# Patient Record
Sex: MALE | Race: WHITE | NOT HISPANIC OR LATINO | Employment: OTHER | URBAN - METROPOLITAN AREA
[De-identification: names, ages, dates, MRNs, and addresses within clinical notes are randomized per-mention and may not be internally consistent; named-entity substitution may affect disease eponyms.]

---

## 2017-03-23 ENCOUNTER — COMMUNICATION - HEALTHEAST (OUTPATIENT)
Dept: INTERNAL MEDICINE | Facility: CLINIC | Age: 77
End: 2017-03-23

## 2017-03-23 DIAGNOSIS — N40.0 BPH (BENIGN PROSTATIC HYPERPLASIA): ICD-10-CM

## 2017-05-02 ENCOUNTER — COMMUNICATION - HEALTHEAST (OUTPATIENT)
Dept: INTERNAL MEDICINE | Facility: CLINIC | Age: 77
End: 2017-05-02

## 2017-05-02 DIAGNOSIS — I10 HYPERTENSION: ICD-10-CM

## 2017-08-10 ENCOUNTER — COMMUNICATION - HEALTHEAST (OUTPATIENT)
Dept: INTERNAL MEDICINE | Facility: CLINIC | Age: 77
End: 2017-08-10

## 2017-08-10 ENCOUNTER — OFFICE VISIT - HEALTHEAST (OUTPATIENT)
Dept: INTERNAL MEDICINE | Facility: CLINIC | Age: 77
End: 2017-08-10

## 2017-08-10 DIAGNOSIS — I10 ESSENTIAL HYPERTENSION: ICD-10-CM

## 2017-08-10 DIAGNOSIS — G57.00 PIRIFORMIS SYNDROME: ICD-10-CM

## 2017-08-10 DIAGNOSIS — R01.1 NEWLY RECOGNIZED MURMUR: ICD-10-CM

## 2017-08-10 DIAGNOSIS — N31.8 HYPERTONICITY OF BLADDER: ICD-10-CM

## 2017-08-10 DIAGNOSIS — R97.20 ELEVATED PSA: ICD-10-CM

## 2017-08-10 DIAGNOSIS — Z76.89 ESTABLISHING CARE WITH NEW DOCTOR, ENCOUNTER FOR: ICD-10-CM

## 2017-08-10 LAB — PSA SERPL-MCNC: 8.4 NG/ML (ref 0–6.5)

## 2017-08-10 ASSESSMENT — MIFFLIN-ST. JEOR: SCORE: 1511.78

## 2017-08-11 ENCOUNTER — COMMUNICATION - HEALTHEAST (OUTPATIENT)
Dept: TELEHEALTH | Facility: CLINIC | Age: 77
End: 2017-08-11

## 2017-09-13 ENCOUNTER — TELEPHONE (OUTPATIENT)
Dept: INTERNAL MEDICINE | Facility: CLINIC | Age: 77
End: 2017-09-13

## 2017-09-13 NOTE — TELEPHONE ENCOUNTER
Patient called and stated that he used to be seen in clinic by Dr. Barba but recently has been followed by Dr. Del Rio at [facility].     Patient stated that Dr. Del Rio noted a hear murmer during a annual physical and recommended a echocardiogram.     Patient wants a second opinion and was recommended to see Dr. Jonas by Dr. Steinberg.    Dr. Jonas- would there be any way to fit patient in earlier than end of November?     Patient will have [facility] fax over records to office.     Thank you

## 2017-09-18 ENCOUNTER — OFFICE VISIT - HEALTHEAST (OUTPATIENT)
Dept: INTERNAL MEDICINE | Facility: CLINIC | Age: 77
End: 2017-09-18

## 2017-09-18 DIAGNOSIS — R01.1 HEART MURMUR: ICD-10-CM

## 2017-09-18 DIAGNOSIS — R97.20 ELEVATED PSA: ICD-10-CM

## 2017-09-18 DIAGNOSIS — H10.9 CONJUNCTIVITIS: ICD-10-CM

## 2017-09-29 ENCOUNTER — OFFICE VISIT (OUTPATIENT)
Dept: INTERNAL MEDICINE | Facility: CLINIC | Age: 77
End: 2017-09-29
Payer: COMMERCIAL

## 2017-09-29 VITALS
TEMPERATURE: 98.3 F | HEIGHT: 71 IN | HEART RATE: 80 BPM | WEIGHT: 174.2 LBS | BODY MASS INDEX: 24.39 KG/M2 | SYSTOLIC BLOOD PRESSURE: 126 MMHG | OXYGEN SATURATION: 96 % | DIASTOLIC BLOOD PRESSURE: 70 MMHG

## 2017-09-29 DIAGNOSIS — R97.20 ELEVATED PROSTATE SPECIFIC ANTIGEN (PSA): ICD-10-CM

## 2017-09-29 DIAGNOSIS — R00.8 GALLOP RHYTHM: Primary | ICD-10-CM

## 2017-09-29 DIAGNOSIS — R01.2 S4 (FOURTH HEART SOUND): ICD-10-CM

## 2017-09-29 PROCEDURE — 99214 OFFICE O/P EST MOD 30 MIN: CPT | Performed by: INTERNAL MEDICINE

## 2017-09-29 NOTE — MR AVS SNAPSHOT
"              After Visit Summary   9/29/2017    Jorge Dupree    MRN: [id number]           Patient Information     Date Of Birth          1940        Visit Information        Provider Department      9/29/2017 1:40 PM Rajendra Jonas MD [facility]        Today's Diagnoses     Gallop rhythm    -  1    S4 (fourth heart sound)        Elevated prostate specific antigen (PSA)          Care Instructions    I am not certain that I hear a heart \"murmur\" (turbulence through heart chamber or across heart valve).   I do think that I hear an \"extra sound\" (possibly an \"S4\" heart sound). This could be from stiffness of the heart or thickening of the heart muscle. Less likely, it could be from the heart pumping less efficiently than we would usually expect.     An echocardiogram (heart ultrasound) would be quite helpful to better understand why your heart sounds the way it does with a stethoscope.   Although it does not seem mandatory to get this test, I think it would be useful and helpful in explaining your heart exam.     It sounds like this could be done at [facility], or at [facility].   The phone number for [facility] echocardiogram lab is [phone].     If you get the echocardiogram done at [facility] but would like to have me interpret the report with you, recommend either a follow up appointment or a phone conversation. If you wish to discuss by phone, be sure that [facility] forwards the results to me.     Usually I will get the Urologist to give us an opinion in their office when the PSA is elevated. If interested, I've provided the phone number for the Urology specialist in [place].           Follow-ups after your visit        Additional Services     UROLOGY ADULT REFERRAL       Your provider has referred you to: JEN: [facility] [place] [phone]   [email or web address]    Please be aware that coverage of these " "services is subject to the terms and limitations of your health insurance plan.  Call member services at your health plan with any benefit or coverage questions.      Please bring the following with you to your appointment:    (1) Any X-Rays, CTs or MRIs which have been performed.  Contact the facility where they were done to arrange for  prior to your scheduled appointment.    (2) List of current medications  (3) This referral request   (4) Any documents/labs given to you for this referral                  Future tests that were ordered for you today     Open Future Orders        Priority Expected Expires Ordered    Echocardiogram Complete Routine  9/29/2018 9/29/2017            Who to contact     If you have questions or need follow up information about today's clinic visit or your schedule please contact [facility] directly at [phone].  Normal or non-critical lab and imaging results will be communicated to you by MyChart, letter or phone within 4 business days after the clinic has received the results. If you do not hear from us within 7 days, please contact the clinic through MyChart or phone. If you have a critical or abnormal lab result, we will notify you by phone as soon as possible.  Submit refill requests through [employer] or call your pharmacy and they will forward the refill request to us. Please allow 3 business days for your refill to be completed.          Additional Information About Your Visit        [employer] Information     [employer] lets you send messages to your doctor, view your test results, renew your prescriptions, schedule appointments and more. To sign up, go to [email or web address] . Click on \"Log in\" on the left side of the screen, which will take you to the Welcome page. Then click on \"Sign up Now\" on the right side of the page.     You will be asked to enter the access code listed below, as well as some personal information. Please follow the directions to " "create your username and password.     Your access code is: [email or web address]  Expires: 2017  2:39 PM     Your access code will  in 90 days. If you need help or a new code, please call your [place] clinic or [phone].        Care EveryWhere ID     This is your Care EveryWhere ID. This could be used by other organizations to access your [place] medical records  [id number]        Your Vitals Were     Pulse Temperature Height Pulse Oximetry BMI (Body Mass Index)       80 98.3  F (36.8  C) (Oral) 5' 11\" (1.803 m) 96% 24.3 kg/m2        Blood Pressure from Last 3 Encounters:   17 126/70   14 134/88   13 108/60    Weight from Last 3 Encounters:   17 174 lb 3.2 oz (79 kg)   14 177 lb (80.3 kg)   13 171 lb 14.4 oz (78 kg)              We Performed the Following     UROLOGY ADULT REFERRAL        Primary Care Provider    None Specified       No primary provider on file.        Equal Access to Services     [facility]: Hadii walter Del Valle, autumn lloyd, qaenrique brand, sarah alcantara . So [facility] [phone].    ATENCIÓN: Si habla español, tiene a gimenez disposición servicios gratuitos de asistencia lingüística. [facility] [phone].    We comply with applicable federal civil rights laws and [place] laws. We do not discriminate on the basis of race, color, national origin, age, disability, sex, sexual orientation, or gender identity.            Thank you!     Thank you for choosing [facility]  for your care. Our goal is always to provide you with excellent care. Hearing back from our patients is one way we can continue to improve our services. Please take a few minutes to complete the written survey that you may receive in the mail after your visit with us. Thank you!             Your Updated Medication List - Protect others around you: Learn how to safely use, store and throw away your medicines at " www.disposemymeds.org.          This list is accurate as of: 9/29/17  2:44 PM.  Always use your most recent med list.                   Brand Name Dispense Instructions for use Diagnosis    amLODIPine 2.5 MG tablet    NORVASC    90 tablet    Take 1 tablet (2.5 mg) by mouth daily    Essential hypertension, benign, Routine general medical examination at a health care facility, Need for prophylactic vaccination and inoculation against influenza       aspirin 81 MG tablet     100    ONE DAILY    Essential hypertension, benign       finasteride 5 MG tablet    PROSCAR    90 tablet    Take 1 tablet (5 mg) by mouth daily    Hypertrophy of prostate with urinary obstruction and other lower urinary tract symptoms (LUTS), Routine general medical examination at a health care facility, Essential hypertension, benign, CARDIOVASCULAR SCREENING; LDL GOAL LESS THAN 160, Urgency-frequency syndrome, Anemia       tamsulosin 0.4 MG capsule    FLOMAX    90 capsule    Take 1 capsule (0.4 mg) by mouth daily    Hypertrophy of prostate with urinary obstruction and other lower urinary tract symptoms (LUTS)       tolterodine 4 MG 24 hr capsule    DETROL LA    90 capsule    Take 1 capsule (4 mg) by mouth daily    Hypertrophy of prostate with urinary obstruction and other lower urinary tract symptoms (LUTS), Urgency-frequency syndrome

## 2017-09-29 NOTE — NURSING NOTE
"Chief Complaint   Patient presents with     Consult     heart murmur       Initial /70 (BP Location: Left arm, Patient Position: Sitting, Cuff Size: Adult Large)  Pulse 80  Temp 98.3  F (36.8  C) (Oral)  Ht 5' 11\" (1.803 m)  Wt 174 lb 3.2 oz (79 kg)  SpO2 96%  BMI 24.3 kg/m2 Estimated body mass index is 24.3 kg/(m^2) as calculated from the following:    Height as of this encounter: 5' 11\" (1.803 m).    Weight as of this encounter: 174 lb 3.2 oz (79 kg).  Medication Reconciliation: complete   [employer]      "

## 2017-09-29 NOTE — PROGRESS NOTES
"  SUBJECTIVE:   1411---1441    Jorge Dupree is a 76 year old male who presents to clinic today for health counseling regarding a reported \"murmur\", and for an elevated TSH. 30 minutes were spent with the patient and his wife face to face today, over 50% of which was devoted to health counseling and education regarding these issues.     Heart murmur  Patient used to see Dr. Barba in the clinic for several years. Dr Barba has not documented any concerns regarding a previous heart murmur on prior visits. The patient more recently has seen Dr. Del Rio in [place]. Patient reports that Dr. Del Rio recommended having a cardiac US performed, but patient was apprehensive and desired another opinion.     Patient is currently taking 2.5 mg of amlodipine daily. He denies chest pain, SOB, and lower extremity edema.     Elevated PSA  PSA from 2016 was 6.7 ug/L. He also had a prostate biopsy performed by Dr. Holley afterwards.  PSA from 9/2/2017 revealed an increase in levels. Patient inquired about having another PSA screen.     Problem list and histories reviewed & adjusted, as indicated.  Additional history: as documented    Reviewed and updated as needed this visit by clinical staff  Tobacco  Allergies  Meds  Med Hx  Surg Hx  Fam Hx  Soc Hx      Reviewed and updated as needed this visit by Provider         ROS:  C: NEGATIVE for fever, chills, change in weight  R: NEGATIVE for significant cough or SOB  B: NEGATIVE for masses, tenderness or discharge  CV: NEGATIVE for chest pain, palpitations or peripheral edema  : NEGATIVE for frequency, dysuria, or hematuria  M: NEGATIVE for significant arthralgias or myalgia  N: POSITIVE for occasional dizziness with quick posture changes.   NEGATIVE for weakness, dizziness or paresthesias    This document serves as a record of the services and decisions personally performed and made by Rajendra Jonas MD. It was created on his behalf by Yomaira Monae, a trained medical " "scribe. The creation of this document is based on the provider's statements to the medical scribe.  Yomaira Monae September 29, 2017 2:08 PM     OBJECTIVE:     /70 (BP Location: Left arm, Patient Position: Sitting, Cuff Size: Adult Large)  Pulse 80  Temp 98.3  F (36.8  C) (Oral)  Ht 1.803 m (5' 11\")  Wt 79 kg (174 lb 3.2 oz)  SpO2 96%  BMI 24.3 kg/m2  Body mass index is 24.3 kg/(m^2).    GENERAL: healthy, alert and no distress  RESP: lungs clear to auscultation - no rales, rhonchi or wheezes  CV: regular rate and rhythm, normal S1 S2, no S3, no murmur, click or rub, no peripheral edema and peripheral pulses strong. Extra sound detected, suspected to be an S4  MS: no gross musculoskeletal defects noted, no edema  SKIN: no suspicious lesions or rashes  NEURO: Normal strength and tone, mentation intact and speech normal  PSYCH: mentation appears normal, affect normal/bright    ASSESSMENT/PLAN:   Health counseling regarding a reported \"murmur\", and for an elevated TSH. 30 minutes were spent with the patient and his wife face to face today, over 50% of which was devoted to health counseling and education regarding these issues.       (R00.8) Gallop rhythm  (primary encounter diagnosis)    (R01.2) S4 (fourth heart sound)  Plan: Echocardiogram Complete        Discussed with patient considering having an echocardiogram to further evaluate S4. Also discussed possible origin of S4- either from stiffening of heart muscle or from the heart beating inefficiently.      (R97.20) Elevated prostate specific antigen (PSA)  Comment: Discussed in depth PSA screenings and possible false positives. Referred to urology.   Plan: UROLOGY ADULT REFERRAL          FUTURE APPOINTMENTS:       - Follow-up visit as needed     Patient Instructions   I am not certain that I hear a heart \"murmur\" (turbulence through heart chamber or across heart valve).   I do think that I hear an \"extra sound\" (possibly an \"S4\" heart sound). This could " be from stiffness of the heart or thickening of the heart muscle. Less likely, it could be from the heart pumping less efficiently than we would usually expect.     An echocardiogram (heart ultrasound) would be quite helpful to better understand why your heart sounds the way it does with a stethoscope.   Although it does not seem mandatory to get this test, I think it would be useful and helpful in explaining your heart exam.     It sounds like this could be done at [facility], or at [facility].   The phone number for [facility] echocardiogram lab is [phone].     If you get the echocardiogram done at [facility] but would like to have me interpret the report with you, recommend either a follow up appointment or a phone conversation. If you wish to discuss by phone, be sure that [facility] forwards the results to me.     Usually I will get the Urologist to give us an opinion in their office when the PSA is elevated. If interested, I've provided the phone number for the Urology specialist in [place].     The information in this document, created by the medical scribe for me, accurately reflects the services I personally performed and the decisions made by me. I have reviewed and approved this document for accuracy prior to leaving the patient care area.  September 29, 2017 2:08 PM    Rajendra Jonas MD  [facility]

## 2017-09-29 NOTE — PATIENT INSTRUCTIONS
"I am not certain that I hear a heart \"murmur\" (turbulence through heart chamber or across heart valve).   I do think that I hear an \"extra sound\" (possibly an \"S4\" heart sound). This could be from stiffness of the heart or thickening of the heart muscle. Less likely, it could be from the heart pumping less efficiently than we would usually expect.     An echocardiogram (heart ultrasound) would be quite helpful to better understand why your heart sounds the way it does with a stethoscope.   Although it does not seem mandatory to get this test, I think it would be useful and helpful in explaining your heart exam.     It sounds like this could be done at [facility], or at [facility].   The phone number for [facility] echocardiogram lab is [phone].     If you get the echocardiogram done at [facility] but would like to have me interpret the report with you, recommend either a follow up appointment or a phone conversation. If you wish to discuss by phone, be sure that [facility] forwards the results to me.     Usually I will get the Urologist to give us an opinion in their office when the PSA is elevated. If interested, I've provided the phone number for the Urology specialist in [place].   "

## 2017-10-03 ENCOUNTER — COMMUNICATION - HEALTHEAST (OUTPATIENT)
Dept: INTERNAL MEDICINE | Facility: CLINIC | Age: 77
End: 2017-10-03

## 2017-10-03 DIAGNOSIS — N40.0 BENIGN PROSTATIC HYPERPLASIA: ICD-10-CM

## 2017-12-29 ENCOUNTER — COMMUNICATION - HEALTHEAST (OUTPATIENT)
Dept: INTERNAL MEDICINE | Facility: CLINIC | Age: 77
End: 2017-12-29

## 2017-12-29 DIAGNOSIS — N40.0 BPH (BENIGN PROSTATIC HYPERPLASIA): ICD-10-CM

## 2018-07-23 ENCOUNTER — COMMUNICATION - HEALTHEAST (OUTPATIENT)
Dept: INTERNAL MEDICINE | Facility: CLINIC | Age: 78
End: 2018-07-23

## 2018-07-23 DIAGNOSIS — N40.0 BPH (BENIGN PROSTATIC HYPERPLASIA): ICD-10-CM

## 2018-08-13 ENCOUNTER — COMMUNICATION - HEALTHEAST (OUTPATIENT)
Dept: INTERNAL MEDICINE | Facility: CLINIC | Age: 78
End: 2018-08-13

## 2018-08-21 ENCOUNTER — COMMUNICATION - HEALTHEAST (OUTPATIENT)
Dept: INTERNAL MEDICINE | Facility: CLINIC | Age: 78
End: 2018-08-21

## 2018-08-21 ENCOUNTER — OFFICE VISIT - HEALTHEAST (OUTPATIENT)
Dept: INTERNAL MEDICINE | Facility: CLINIC | Age: 78
End: 2018-08-21

## 2018-08-21 DIAGNOSIS — R01.1 HEART MURMUR: ICD-10-CM

## 2018-08-21 DIAGNOSIS — N31.8 HYPERTONICITY OF BLADDER: ICD-10-CM

## 2018-08-21 DIAGNOSIS — H61.23 BILATERAL IMPACTED CERUMEN: ICD-10-CM

## 2018-08-21 DIAGNOSIS — R97.20 ELEVATED PSA: ICD-10-CM

## 2018-08-21 DIAGNOSIS — I10 ESSENTIAL HYPERTENSION: ICD-10-CM

## 2018-08-21 DIAGNOSIS — Z00.00 ROUTINE GENERAL MEDICAL EXAMINATION AT A HEALTH CARE FACILITY: ICD-10-CM

## 2018-08-21 LAB
ALBUMIN SERPL-MCNC: 3.9 G/DL (ref 3.5–5)
ALP SERPL-CCNC: 107 U/L (ref 45–120)
ALT SERPL W P-5'-P-CCNC: 16 U/L (ref 0–45)
ANION GAP SERPL CALCULATED.3IONS-SCNC: 8 MMOL/L (ref 5–18)
AST SERPL W P-5'-P-CCNC: 17 U/L (ref 0–40)
BILIRUB SERPL-MCNC: 0.7 MG/DL (ref 0–1)
BUN SERPL-MCNC: 18 MG/DL (ref 8–28)
CALCIUM SERPL-MCNC: 9.2 MG/DL (ref 8.5–10.5)
CHLORIDE BLD-SCNC: 108 MMOL/L (ref 98–107)
CHOLEST SERPL-MCNC: 155 MG/DL
CO2 SERPL-SCNC: 25 MMOL/L (ref 22–31)
CREAT SERPL-MCNC: 0.81 MG/DL (ref 0.7–1.3)
FASTING STATUS PATIENT QL REPORTED: YES
GFR SERPL CREATININE-BSD FRML MDRD: >60 ML/MIN/1.73M2
GLUCOSE BLD-MCNC: 99 MG/DL (ref 70–125)
HDLC SERPL-MCNC: 62 MG/DL
LDLC SERPL CALC-MCNC: 86 MG/DL
POTASSIUM BLD-SCNC: 4.2 MMOL/L (ref 3.5–5)
PROT SERPL-MCNC: 6.5 G/DL (ref 6–8)
PSA SERPL-MCNC: 10.2 NG/ML (ref 0–6.5)
SODIUM SERPL-SCNC: 141 MMOL/L (ref 136–145)
TRIGL SERPL-MCNC: 34 MG/DL

## 2018-08-21 ASSESSMENT — MIFFLIN-ST. JEOR: SCORE: 1521.16

## 2018-08-22 ENCOUNTER — COMMUNICATION - HEALTHEAST (OUTPATIENT)
Dept: INTERNAL MEDICINE | Facility: CLINIC | Age: 78
End: 2018-08-22

## 2018-08-30 ENCOUNTER — COMMUNICATION - HEALTHEAST (OUTPATIENT)
Dept: INTERNAL MEDICINE | Facility: CLINIC | Age: 78
End: 2018-08-30

## 2018-08-30 ENCOUNTER — HOSPITAL ENCOUNTER (OUTPATIENT)
Dept: CARDIOLOGY | Facility: CLINIC | Age: 78
Discharge: HOME | End: 2018-08-30
Attending: INTERNAL MEDICINE

## 2018-08-30 DIAGNOSIS — N40.0 BENIGN PROSTATIC HYPERPLASIA: ICD-10-CM

## 2018-08-30 DIAGNOSIS — R01.1 HEART MURMUR: ICD-10-CM

## 2018-08-30 ASSESSMENT — MIFFLIN-ST. JEOR: SCORE: 1517.98

## 2018-08-31 LAB
AORTIC ROOT: 3.4 CM
ASCENDING AORTA: 3.1 CM
ASCENDING AORTA: 3.1 CM
BSA FOR ECHO PROCEDURE: 1.99 M2
CV BLOOD PRESSURE: NORMAL MMHG
CV ECHO HEIGHT: 70.5 IN
CV ECHO WEIGHT: 175 LBS
DOP CALC LVOT AREA: 4.52 CM2
DOP CALC LVOT DIAMETER: 2.4 CM
DOP CALC LVOT PEAK VEL: 88.7 CM/S
DOP CALC LVOT STROKE VOLUME: 85 CM3
DOP CALCLVOT PEAK VEL VTI: 18.8 CM
ECHO EJECTION FRACTION ESTIMATED: 60 %
EJECTION FRACTION: 55 % (ref 55–75)
FRACTIONAL SHORTENING: 31.9 % (ref 28–44)
INTERVENTRICULAR SEPTUM IN END DIASTOLE: 1.1 CM (ref 0.6–1)
IVS/PW RATIO: 1
LA AREA 1: 18.6 CM2
LA AREA 2: 14.9 CM2
LEFT ATRIUM LENGTH: 4.7 CM
LEFT ATRIUM SIZE: 3.1 CM
LEFT ATRIUM TO AORTIC ROOT RATIO: 0.91 NO UNITS
LEFT ATRIUM VOLUME INDEX: 25.2 ML/M2
LEFT ATRIUM VOLUME: 50.1 ML
LEFT VENTRICLE CARDIAC INDEX: 2.9 L/MIN/M2
LEFT VENTRICLE CARDIAC OUTPUT: 5.9 L/MIN
LEFT VENTRICLE DIASTOLIC VOLUME INDEX: 36.1 CM3/M2 (ref 34–74)
LEFT VENTRICLE DIASTOLIC VOLUME: 71.9 CM3 (ref 62–150)
LEFT VENTRICLE HEART RATE: 69 BPM
LEFT VENTRICLE MASS INDEX: 100.4 G/M2
LEFT VENTRICLE SYSTOLIC VOLUME INDEX: 16.1 CM3/M2 (ref 11–31)
LEFT VENTRICLE SYSTOLIC VOLUME: 32 CM3 (ref 21–61)
LEFT VENTRICULAR INTERNAL DIMENSION IN DIASTOLE: 4.89 CM (ref 4.2–5.8)
LEFT VENTRICULAR INTERNAL DIMENSION IN SYSTOLE: 3.33 CM (ref 2.5–4)
LEFT VENTRICULAR MASS: 199.8 G
LEFT VENTRICULAR OUTFLOW TRACT MEAN GRADIENT: 2 MMHG
LEFT VENTRICULAR OUTFLOW TRACT MEAN VELOCITY: 60.2 CM/S
LEFT VENTRICULAR OUTFLOW TRACT PEAK GRADIENT: 3 MMHG
LEFT VENTRICULAR POSTERIOR WALL IN END DIASTOLE: 1.1 CM (ref 0.6–1)
LV STROKE VOLUME INDEX: 42.7 ML/M2
MITRAL VALVE E/A RATIO: 0.6
MV AVERAGE E/E' RATIO: 10.1 CM/S
MV DECELERATION TIME: 363 MS
MV E'TISSUE VEL-LAT: 6.38 CM/S
MV E'TISSUE VEL-MED: 4.06 CM/S
MV LATERAL E/E' RATIO: 8.3
MV MEDIAL E/E' RATIO: 13
MV PEAK A VELOCITY: 87.3 CM/S
MV PEAK E VELOCITY: 52.9 CM/S
NUC REST DIASTOLIC VOLUME INDEX: 2800 LBS
NUC REST SYSTOLIC VOLUME INDEX: 70.5 IN
RIGHT VENTRICULAR INTERNAL DIMENSION IN DYSTOLE: 3.73 CM
TRICUSPID REGURGITATION PEAK PRESSURE GRADIENT: 21 MMHG
TRICUSPID VALVE ANULAR PLANE SYSTOLIC EXCURSION: 3 CM
TRICUSPID VALVE PEAK REGURGITANT VELOCITY: 229 CM/S

## 2018-09-03 ENCOUNTER — COMMUNICATION - HEALTHEAST (OUTPATIENT)
Dept: INTERNAL MEDICINE | Facility: CLINIC | Age: 78
End: 2018-09-03

## 2018-09-19 ENCOUNTER — RECORDS - HEALTHEAST (OUTPATIENT)
Dept: ADMINISTRATIVE | Facility: OTHER | Age: 78
End: 2018-09-19

## 2018-09-20 ENCOUNTER — RECORDS - HEALTHEAST (OUTPATIENT)
Dept: ADMINISTRATIVE | Facility: OTHER | Age: 78
End: 2018-09-20

## 2018-10-08 ENCOUNTER — RECORDS - HEALTHEAST (OUTPATIENT)
Dept: ADMINISTRATIVE | Facility: OTHER | Age: 78
End: 2018-10-08

## 2018-10-19 ENCOUNTER — RECORDS - HEALTHEAST (OUTPATIENT)
Dept: ADMINISTRATIVE | Facility: OTHER | Age: 78
End: 2018-10-19

## 2018-10-28 ENCOUNTER — COMMUNICATION - HEALTHEAST (OUTPATIENT)
Dept: INTERNAL MEDICINE | Facility: CLINIC | Age: 78
End: 2018-10-28

## 2018-10-28 DIAGNOSIS — N40.0 BPH (BENIGN PROSTATIC HYPERPLASIA): ICD-10-CM

## 2018-11-01 ENCOUNTER — RECORDS - HEALTHEAST (OUTPATIENT)
Dept: ADMINISTRATIVE | Facility: OTHER | Age: 78
End: 2018-11-01

## 2018-11-17 ENCOUNTER — COMMUNICATION - HEALTHEAST (OUTPATIENT)
Dept: INTERNAL MEDICINE | Facility: CLINIC | Age: 78
End: 2018-11-17

## 2018-11-23 ENCOUNTER — RECORDS - HEALTHEAST (OUTPATIENT)
Dept: ADMINISTRATIVE | Facility: OTHER | Age: 78
End: 2018-11-23

## 2018-11-26 ENCOUNTER — COMMUNICATION - HEALTHEAST (OUTPATIENT)
Dept: ONCOLOGY | Facility: HOSPITAL | Age: 78
End: 2018-11-26

## 2018-11-28 ENCOUNTER — COMMUNICATION - HEALTHEAST (OUTPATIENT)
Dept: ONCOLOGY | Facility: HOSPITAL | Age: 78
End: 2018-11-28

## 2018-11-29 ENCOUNTER — COMMUNICATION - HEALTHEAST (OUTPATIENT)
Dept: ONCOLOGY | Facility: HOSPITAL | Age: 78
End: 2018-11-29

## 2018-11-30 ENCOUNTER — COMMUNICATION - HEALTHEAST (OUTPATIENT)
Dept: INTERNAL MEDICINE | Facility: CLINIC | Age: 78
End: 2018-11-30

## 2018-11-30 DIAGNOSIS — N40.0 BENIGN PROSTATIC HYPERPLASIA: ICD-10-CM

## 2018-12-04 ENCOUNTER — RECORDS - HEALTHEAST (OUTPATIENT)
Dept: ADMINISTRATIVE | Facility: OTHER | Age: 78
End: 2018-12-04

## 2018-12-12 ENCOUNTER — OFFICE VISIT - HEALTHEAST (OUTPATIENT)
Dept: RADIATION ONCOLOGY | Facility: CLINIC | Age: 78
End: 2018-12-12

## 2018-12-12 ENCOUNTER — COMMUNICATION - HEALTHEAST (OUTPATIENT)
Dept: TELEHEALTH | Facility: CLINIC | Age: 78
End: 2018-12-12

## 2018-12-12 DIAGNOSIS — C61 MALIGNANT NEOPLASM OF PROSTATE (H): ICD-10-CM

## 2018-12-17 ENCOUNTER — COMMUNICATION - HEALTHEAST (OUTPATIENT)
Dept: ONCOLOGY | Facility: HOSPITAL | Age: 78
End: 2018-12-17

## 2018-12-17 ENCOUNTER — COMMUNICATION - HEALTHEAST (OUTPATIENT)
Dept: INTERNAL MEDICINE | Facility: CLINIC | Age: 78
End: 2018-12-17

## 2018-12-17 DIAGNOSIS — I10 ESSENTIAL HYPERTENSION: ICD-10-CM

## 2018-12-20 ENCOUNTER — AMBULATORY - HEALTHEAST (OUTPATIENT)
Dept: ONCOLOGY | Facility: HOSPITAL | Age: 78
End: 2018-12-20

## 2018-12-28 ENCOUNTER — COMMUNICATION - HEALTHEAST (OUTPATIENT)
Dept: INFUSION THERAPY | Facility: HOSPITAL | Age: 78
End: 2018-12-28

## 2019-01-16 ENCOUNTER — OFFICE VISIT - HEALTHEAST (OUTPATIENT)
Dept: RADIATION ONCOLOGY | Facility: CLINIC | Age: 79
End: 2019-01-16

## 2019-01-16 DIAGNOSIS — C61 MALIGNANT NEOPLASM OF PROSTATE (H): ICD-10-CM

## 2019-01-18 ENCOUNTER — COMMUNICATION - HEALTHEAST (OUTPATIENT)
Dept: INTERNAL MEDICINE | Facility: CLINIC | Age: 79
End: 2019-01-18

## 2019-01-18 DIAGNOSIS — N40.0 BPH (BENIGN PROSTATIC HYPERPLASIA): ICD-10-CM

## 2019-01-24 ENCOUNTER — RECORDS - HEALTHEAST (OUTPATIENT)
Dept: ADMINISTRATIVE | Facility: OTHER | Age: 79
End: 2019-01-24

## 2019-02-04 ENCOUNTER — AMBULATORY - HEALTHEAST (OUTPATIENT)
Dept: RADIATION ONCOLOGY | Facility: HOSPITAL | Age: 79
End: 2019-02-04

## 2019-02-04 DIAGNOSIS — C61 MALIGNANT NEOPLASM OF PROSTATE (H): ICD-10-CM

## 2019-02-19 ENCOUNTER — OFFICE VISIT - HEALTHEAST (OUTPATIENT)
Dept: RADIATION ONCOLOGY | Facility: CLINIC | Age: 79
End: 2019-02-19

## 2019-02-19 DIAGNOSIS — C61 MALIGNANT NEOPLASM OF PROSTATE (H): ICD-10-CM

## 2019-02-19 DIAGNOSIS — N31.8 HYPERTONICITY OF BLADDER: ICD-10-CM

## 2019-02-20 ENCOUNTER — AMBULATORY - HEALTHEAST (OUTPATIENT)
Dept: RADIATION ONCOLOGY | Facility: CLINIC | Age: 79
End: 2019-02-20

## 2019-02-20 DIAGNOSIS — N31.8 HYPERTONICITY OF BLADDER: ICD-10-CM

## 2019-02-25 ENCOUNTER — AMBULATORY - HEALTHEAST (OUTPATIENT)
Dept: ONCOLOGY | Facility: HOSPITAL | Age: 79
End: 2019-02-25

## 2019-02-26 ENCOUNTER — OFFICE VISIT - HEALTHEAST (OUTPATIENT)
Dept: RADIATION ONCOLOGY | Facility: CLINIC | Age: 79
End: 2019-02-26

## 2019-02-26 DIAGNOSIS — C61 MALIGNANT NEOPLASM OF PROSTATE (H): ICD-10-CM

## 2019-03-03 ENCOUNTER — AMBULATORY - HEALTHEAST (OUTPATIENT)
Dept: RADIATION ONCOLOGY | Facility: HOSPITAL | Age: 79
End: 2019-03-03

## 2019-03-04 ENCOUNTER — RECORDS - HEALTHEAST (OUTPATIENT)
Dept: ADMINISTRATIVE | Facility: OTHER | Age: 79
End: 2019-03-04

## 2019-03-05 ENCOUNTER — OFFICE VISIT - HEALTHEAST (OUTPATIENT)
Dept: RADIATION ONCOLOGY | Facility: CLINIC | Age: 79
End: 2019-03-05

## 2019-03-05 DIAGNOSIS — C61 MALIGNANT NEOPLASM OF PROSTATE (H): ICD-10-CM

## 2019-03-06 ENCOUNTER — COMMUNICATION - HEALTHEAST (OUTPATIENT)
Dept: RADIATION ONCOLOGY | Facility: CLINIC | Age: 79
End: 2019-03-06

## 2019-03-12 ENCOUNTER — OFFICE VISIT - HEALTHEAST (OUTPATIENT)
Dept: RADIATION ONCOLOGY | Facility: CLINIC | Age: 79
End: 2019-03-12

## 2019-03-12 DIAGNOSIS — C61 MALIGNANT NEOPLASM OF PROSTATE (H): ICD-10-CM

## 2019-03-12 DIAGNOSIS — N31.8 HYPERTONICITY OF BLADDER: ICD-10-CM

## 2019-03-14 ENCOUNTER — OFFICE VISIT - HEALTHEAST (OUTPATIENT)
Dept: INTERNAL MEDICINE | Facility: CLINIC | Age: 79
End: 2019-03-14

## 2019-03-14 DIAGNOSIS — C61 PROSTATE CANCER (H): ICD-10-CM

## 2019-03-14 DIAGNOSIS — I10 ESSENTIAL HYPERTENSION: ICD-10-CM

## 2019-03-14 DIAGNOSIS — N31.8 HYPERTONICITY OF BLADDER: ICD-10-CM

## 2019-03-19 ENCOUNTER — OFFICE VISIT - HEALTHEAST (OUTPATIENT)
Dept: RADIATION ONCOLOGY | Facility: CLINIC | Age: 79
End: 2019-03-19

## 2019-03-19 DIAGNOSIS — C61 MALIGNANT NEOPLASM OF PROSTATE (H): ICD-10-CM

## 2019-03-22 ENCOUNTER — AMBULATORY - HEALTHEAST (OUTPATIENT)
Dept: RADIATION ONCOLOGY | Facility: CLINIC | Age: 79
End: 2019-03-22

## 2019-03-25 ENCOUNTER — AMBULATORY - HEALTHEAST (OUTPATIENT)
Dept: RADIATION ONCOLOGY | Facility: CLINIC | Age: 79
End: 2019-03-25

## 2019-03-26 ENCOUNTER — OFFICE VISIT - HEALTHEAST (OUTPATIENT)
Dept: RADIATION ONCOLOGY | Facility: CLINIC | Age: 79
End: 2019-03-26

## 2019-03-26 DIAGNOSIS — C61 PROSTATE CANCER (H): ICD-10-CM

## 2019-03-28 ENCOUNTER — OFFICE VISIT - HEALTHEAST (OUTPATIENT)
Dept: RADIATION ONCOLOGY | Facility: CLINIC | Age: 79
End: 2019-03-28

## 2019-03-28 ENCOUNTER — RECORDS - HEALTHEAST (OUTPATIENT)
Dept: ADMINISTRATIVE | Facility: OTHER | Age: 79
End: 2019-03-28

## 2019-03-28 ENCOUNTER — AMBULATORY - HEALTHEAST (OUTPATIENT)
Dept: RADIATION ONCOLOGY | Facility: HOSPITAL | Age: 79
End: 2019-03-28

## 2019-03-28 DIAGNOSIS — C61 MALIGNANT NEOPLASM OF PROSTATE (H): ICD-10-CM

## 2019-03-29 ENCOUNTER — AMBULATORY - HEALTHEAST (OUTPATIENT)
Dept: RADIATION ONCOLOGY | Facility: HOSPITAL | Age: 79
End: 2019-03-29

## 2019-04-02 ENCOUNTER — OFFICE VISIT - HEALTHEAST (OUTPATIENT)
Dept: RADIATION ONCOLOGY | Facility: CLINIC | Age: 79
End: 2019-04-02

## 2019-04-02 DIAGNOSIS — C61 MALIGNANT NEOPLASM OF PROSTATE (H): ICD-10-CM

## 2019-04-05 ENCOUNTER — OFFICE VISIT - HEALTHEAST (OUTPATIENT)
Dept: RADIATION ONCOLOGY | Facility: CLINIC | Age: 79
End: 2019-04-05

## 2019-04-05 DIAGNOSIS — C61 PROSTATE CANCER (H): ICD-10-CM

## 2019-04-05 LAB
ALBUMIN UR-MCNC: NEGATIVE MG/DL
APPEARANCE UR: CLEAR
BACTERIA #/AREA URNS HPF: ABNORMAL HPF
BILIRUB UR QL STRIP: NEGATIVE
COLOR UR AUTO: ABNORMAL
GLUCOSE UR STRIP-MCNC: NEGATIVE MG/DL
HGB UR QL STRIP: ABNORMAL
KETONES UR STRIP-MCNC: NEGATIVE MG/DL
LEUKOCYTE ESTERASE UR QL STRIP: NEGATIVE
MUCOUS THREADS #/AREA URNS LPF: ABNORMAL LPF
NITRATE UR QL: NEGATIVE
PH UR STRIP: 6 [PH] (ref 4.5–8)
RBC #/AREA URNS AUTO: ABNORMAL HPF
SP GR UR STRIP: 1 (ref 1–1.03)
SQUAMOUS #/AREA URNS AUTO: ABNORMAL LPF
UROBILINOGEN UR STRIP-ACNC: ABNORMAL
WBC #/AREA URNS AUTO: ABNORMAL HPF

## 2019-04-09 ENCOUNTER — OFFICE VISIT - HEALTHEAST (OUTPATIENT)
Dept: RADIATION ONCOLOGY | Facility: CLINIC | Age: 79
End: 2019-04-09

## 2019-04-09 DIAGNOSIS — C61 PROSTATE CANCER (H): ICD-10-CM

## 2019-04-15 ENCOUNTER — AMBULATORY - HEALTHEAST (OUTPATIENT)
Dept: RADIATION ONCOLOGY | Facility: HOSPITAL | Age: 79
End: 2019-04-15

## 2019-04-15 DIAGNOSIS — C61 MALIGNANT NEOPLASM OF PROSTATE (H): ICD-10-CM

## 2019-04-15 DIAGNOSIS — N31.8 HYPERTONICITY OF BLADDER: ICD-10-CM

## 2019-04-16 ENCOUNTER — OFFICE VISIT - HEALTHEAST (OUTPATIENT)
Dept: RADIATION ONCOLOGY | Facility: CLINIC | Age: 79
End: 2019-04-16

## 2019-04-16 DIAGNOSIS — C61 MALIGNANT NEOPLASM OF PROSTATE (H): ICD-10-CM

## 2019-04-22 ENCOUNTER — OFFICE VISIT - HEALTHEAST (OUTPATIENT)
Dept: PHYSICAL THERAPY | Facility: REHABILITATION | Age: 79
End: 2019-04-22

## 2019-04-22 DIAGNOSIS — C61 MALIGNANT NEOPLASM OF PROSTATE (H): ICD-10-CM

## 2019-04-22 DIAGNOSIS — R39.15 URINARY URGENCY: ICD-10-CM

## 2019-04-22 DIAGNOSIS — N81.89 PELVIC FLOOR WEAKNESS: ICD-10-CM

## 2019-04-23 ENCOUNTER — OFFICE VISIT - HEALTHEAST (OUTPATIENT)
Dept: RADIATION ONCOLOGY | Facility: CLINIC | Age: 79
End: 2019-04-23

## 2019-04-23 DIAGNOSIS — C61 MALIGNANT NEOPLASM OF PROSTATE (H): ICD-10-CM

## 2019-04-25 ENCOUNTER — COMMUNICATION - HEALTHEAST (OUTPATIENT)
Dept: ONCOLOGY | Facility: HOSPITAL | Age: 79
End: 2019-04-25

## 2019-04-30 ENCOUNTER — OFFICE VISIT - HEALTHEAST (OUTPATIENT)
Dept: RADIATION ONCOLOGY | Facility: CLINIC | Age: 79
End: 2019-04-30

## 2019-04-30 DIAGNOSIS — C61 MALIGNANT NEOPLASM OF PROSTATE (H): ICD-10-CM

## 2019-05-02 ENCOUNTER — AMBULATORY - HEALTHEAST (OUTPATIENT)
Dept: RADIATION ONCOLOGY | Facility: CLINIC | Age: 79
End: 2019-05-02

## 2019-05-02 DIAGNOSIS — C61 MALIGNANT NEOPLASM OF PROSTATE (H): ICD-10-CM

## 2019-05-06 ENCOUNTER — OFFICE VISIT - HEALTHEAST (OUTPATIENT)
Dept: PHYSICAL THERAPY | Facility: REHABILITATION | Age: 79
End: 2019-05-06

## 2019-05-06 DIAGNOSIS — R39.15 URINARY URGENCY: ICD-10-CM

## 2019-05-06 DIAGNOSIS — N81.89 PELVIC FLOOR WEAKNESS: ICD-10-CM

## 2019-05-06 DIAGNOSIS — C61 MALIGNANT NEOPLASM OF PROSTATE (H): ICD-10-CM

## 2019-05-16 ENCOUNTER — COMMUNICATION - HEALTHEAST (OUTPATIENT)
Dept: RADIATION ONCOLOGY | Facility: CLINIC | Age: 79
End: 2019-05-16

## 2019-06-03 ENCOUNTER — AMBULATORY - HEALTHEAST (OUTPATIENT)
Dept: LAB | Facility: CLINIC | Age: 79
End: 2019-06-03

## 2019-06-03 DIAGNOSIS — C61 MALIGNANT NEOPLASM OF PROSTATE (H): ICD-10-CM

## 2019-06-03 LAB — PSA SERPL-MCNC: <0.1 NG/ML (ref 0–6.5)

## 2019-06-05 ENCOUNTER — OFFICE VISIT - HEALTHEAST (OUTPATIENT)
Dept: RADIATION ONCOLOGY | Facility: CLINIC | Age: 79
End: 2019-06-05

## 2019-06-05 DIAGNOSIS — C61 MALIGNANT NEOPLASM OF PROSTATE (H): ICD-10-CM

## 2019-06-11 ENCOUNTER — AMBULATORY - HEALTHEAST (OUTPATIENT)
Dept: RADIATION ONCOLOGY | Facility: CLINIC | Age: 79
End: 2019-06-11

## 2019-06-11 DIAGNOSIS — N31.8 HYPERTONICITY OF BLADDER: ICD-10-CM

## 2019-06-11 DIAGNOSIS — C61 MALIGNANT NEOPLASM OF PROSTATE (H): ICD-10-CM

## 2019-06-13 ENCOUNTER — COMMUNICATION - HEALTHEAST (OUTPATIENT)
Dept: ONCOLOGY | Facility: CLINIC | Age: 79
End: 2019-06-13

## 2019-07-22 ENCOUNTER — COMMUNICATION - HEALTHEAST (OUTPATIENT)
Dept: ONCOLOGY | Facility: CLINIC | Age: 79
End: 2019-07-22

## 2019-08-06 ENCOUNTER — COMMUNICATION - HEALTHEAST (OUTPATIENT)
Dept: ONCOLOGY | Facility: CLINIC | Age: 79
End: 2019-08-06

## 2019-10-01 ENCOUNTER — OFFICE VISIT - HEALTHEAST (OUTPATIENT)
Dept: FAMILY MEDICINE | Facility: CLINIC | Age: 79
End: 2019-10-01

## 2019-10-01 DIAGNOSIS — N40.1 BENIGN PROSTATIC HYPERPLASIA WITH LOWER URINARY TRACT SYMPTOMS, SYMPTOM DETAILS UNSPECIFIED: ICD-10-CM

## 2019-10-01 DIAGNOSIS — N31.8 HYPERTONICITY OF BLADDER: ICD-10-CM

## 2019-10-01 DIAGNOSIS — N40.0 BPH (BENIGN PROSTATIC HYPERPLASIA): ICD-10-CM

## 2019-10-01 DIAGNOSIS — M25.561 ACUTE PAIN OF RIGHT KNEE: ICD-10-CM

## 2019-10-01 DIAGNOSIS — I10 ESSENTIAL HYPERTENSION: ICD-10-CM

## 2019-10-01 DIAGNOSIS — H61.23 BILATERAL IMPACTED CERUMEN: ICD-10-CM

## 2019-10-07 ENCOUNTER — COMMUNICATION - HEALTHEAST (OUTPATIENT)
Dept: SCHEDULING | Facility: CLINIC | Age: 79
End: 2019-10-07

## 2019-10-07 ENCOUNTER — AMBULATORY - HEALTHEAST (OUTPATIENT)
Dept: FAMILY MEDICINE | Facility: CLINIC | Age: 79
End: 2019-10-07

## 2019-10-07 DIAGNOSIS — I10 ESSENTIAL HYPERTENSION: ICD-10-CM

## 2019-11-26 ENCOUNTER — COMMUNICATION - HEALTHEAST (OUTPATIENT)
Dept: INTERNAL MEDICINE | Facility: CLINIC | Age: 79
End: 2019-11-26

## 2019-11-26 DIAGNOSIS — I10 ESSENTIAL HYPERTENSION: ICD-10-CM

## 2019-12-02 ENCOUNTER — AMBULATORY - HEALTHEAST (OUTPATIENT)
Dept: LAB | Facility: CLINIC | Age: 79
End: 2019-12-02

## 2019-12-02 DIAGNOSIS — C61 MALIGNANT NEOPLASM OF PROSTATE (H): ICD-10-CM

## 2019-12-02 LAB — PSA SERPL-MCNC: <0.1 NG/ML (ref 0–6.5)

## 2019-12-04 ENCOUNTER — OFFICE VISIT - HEALTHEAST (OUTPATIENT)
Dept: RADIATION ONCOLOGY | Facility: CLINIC | Age: 79
End: 2019-12-04

## 2019-12-04 ENCOUNTER — AMBULATORY - HEALTHEAST (OUTPATIENT)
Dept: RADIATION ONCOLOGY | Facility: CLINIC | Age: 79
End: 2019-12-04

## 2019-12-04 DIAGNOSIS — N31.8 HYPERTONICITY OF BLADDER: ICD-10-CM

## 2019-12-04 DIAGNOSIS — C61 MALIGNANT NEOPLASM OF PROSTATE (H): ICD-10-CM

## 2020-02-17 ENCOUNTER — COMMUNICATION - HEALTHEAST (OUTPATIENT)
Dept: RADIATION ONCOLOGY | Facility: HOSPITAL | Age: 80
End: 2020-02-17

## 2020-02-24 ENCOUNTER — COMMUNICATION - HEALTHEAST (OUTPATIENT)
Dept: SCHEDULING | Facility: CLINIC | Age: 80
End: 2020-02-24

## 2020-02-24 DIAGNOSIS — N40.0 BENIGN PROSTATIC HYPERPLASIA: ICD-10-CM

## 2020-02-25 ENCOUNTER — COMMUNICATION - HEALTHEAST (OUTPATIENT)
Dept: INTERNAL MEDICINE | Facility: CLINIC | Age: 80
End: 2020-02-25

## 2020-02-25 ENCOUNTER — OFFICE VISIT - HEALTHEAST (OUTPATIENT)
Dept: INTERNAL MEDICINE | Facility: CLINIC | Age: 80
End: 2020-02-25

## 2020-02-25 DIAGNOSIS — C61 MALIGNANT NEOPLASM OF PROSTATE (H): ICD-10-CM

## 2020-02-25 DIAGNOSIS — N40.0 BENIGN PROSTATIC HYPERPLASIA: ICD-10-CM

## 2020-02-25 DIAGNOSIS — K64.4 EXTERNAL HEMORRHOIDS: ICD-10-CM

## 2020-02-25 DIAGNOSIS — M76.01 GLUTEAL TENDINITIS OF RIGHT BUTTOCK: ICD-10-CM

## 2020-02-25 DIAGNOSIS — I10 ESSENTIAL HYPERTENSION: ICD-10-CM

## 2020-02-25 DIAGNOSIS — N31.8 HYPERTONICITY OF BLADDER: ICD-10-CM

## 2020-02-25 DIAGNOSIS — Z00.00 ROUTINE GENERAL MEDICAL EXAMINATION AT A HEALTH CARE FACILITY: ICD-10-CM

## 2020-02-25 LAB
ALBUMIN SERPL-MCNC: 3.9 G/DL (ref 3.5–5)
ALBUMIN UR-MCNC: NEGATIVE MG/DL
ALP SERPL-CCNC: 138 U/L (ref 45–120)
ALT SERPL W P-5'-P-CCNC: 14 U/L (ref 0–45)
ANION GAP SERPL CALCULATED.3IONS-SCNC: 7 MMOL/L (ref 5–18)
APPEARANCE UR: CLEAR
AST SERPL W P-5'-P-CCNC: 14 U/L (ref 0–40)
BACTERIA #/AREA URNS HPF: NORMAL HPF
BILIRUB SERPL-MCNC: 0.5 MG/DL (ref 0–1)
BILIRUB UR QL STRIP: NEGATIVE
BUN SERPL-MCNC: 16 MG/DL (ref 8–28)
CALCIUM SERPL-MCNC: 9.3 MG/DL (ref 8.5–10.5)
CHLORIDE BLD-SCNC: 107 MMOL/L (ref 98–107)
CO2 SERPL-SCNC: 29 MMOL/L (ref 22–31)
COLOR UR AUTO: YELLOW
CREAT SERPL-MCNC: 0.77 MG/DL (ref 0.7–1.3)
ERYTHROCYTE [DISTWIDTH] IN BLOOD BY AUTOMATED COUNT: 12.9 % (ref 11–14.5)
GFR SERPL CREATININE-BSD FRML MDRD: >60 ML/MIN/1.73M2
GLUCOSE BLD-MCNC: 97 MG/DL (ref 70–125)
GLUCOSE UR STRIP-MCNC: NEGATIVE MG/DL
HCT VFR BLD AUTO: 40.2 % (ref 40–54)
HGB BLD-MCNC: 13.6 G/DL (ref 14–18)
HGB UR QL STRIP: NEGATIVE
KETONES UR STRIP-MCNC: NEGATIVE MG/DL
LEUKOCYTE ESTERASE UR QL STRIP: NEGATIVE
MCH RBC QN AUTO: 29.9 PG (ref 27–34)
MCHC RBC AUTO-ENTMCNC: 33.9 G/DL (ref 32–36)
MCV RBC AUTO: 88 FL (ref 80–100)
NITRATE UR QL: NEGATIVE
PH UR STRIP: 5.5 [PH] (ref 5–8)
PLATELET # BLD AUTO: 276 THOU/UL (ref 140–440)
PMV BLD AUTO: 7 FL (ref 7–10)
POTASSIUM BLD-SCNC: 4.2 MMOL/L (ref 3.5–5)
PROT SERPL-MCNC: 6.8 G/DL (ref 6–8)
RBC # BLD AUTO: 4.55 MILL/UL (ref 4.4–6.2)
RBC #/AREA URNS AUTO: NORMAL HPF
SODIUM SERPL-SCNC: 143 MMOL/L (ref 136–145)
SP GR UR STRIP: >=1.03 (ref 1–1.03)
SQUAMOUS #/AREA URNS AUTO: NORMAL LPF
UROBILINOGEN UR STRIP-ACNC: NORMAL
WBC #/AREA URNS AUTO: NORMAL HPF
WBC: 4.1 THOU/UL (ref 4–11)

## 2020-02-25 ASSESSMENT — MIFFLIN-ST. JEOR: SCORE: 1504.6

## 2020-03-02 ENCOUNTER — OFFICE VISIT - HEALTHEAST (OUTPATIENT)
Dept: PHYSICAL THERAPY | Facility: REHABILITATION | Age: 80
End: 2020-03-02

## 2020-03-02 DIAGNOSIS — M79.604 RIGHT LEG PAIN: ICD-10-CM

## 2020-03-02 DIAGNOSIS — M76.01 GLUTEAL TENDINITIS OF RIGHT BUTTOCK: ICD-10-CM

## 2020-03-05 ENCOUNTER — OFFICE VISIT - HEALTHEAST (OUTPATIENT)
Dept: PHYSICAL THERAPY | Facility: REHABILITATION | Age: 80
End: 2020-03-05

## 2020-03-05 DIAGNOSIS — M76.01 GLUTEAL TENDINITIS OF RIGHT BUTTOCK: ICD-10-CM

## 2020-03-05 DIAGNOSIS — M79.604 RIGHT LEG PAIN: ICD-10-CM

## 2020-03-10 ENCOUNTER — OFFICE VISIT - HEALTHEAST (OUTPATIENT)
Dept: PHYSICAL THERAPY | Facility: REHABILITATION | Age: 80
End: 2020-03-10

## 2020-03-10 DIAGNOSIS — M79.604 RIGHT LEG PAIN: ICD-10-CM

## 2020-03-10 DIAGNOSIS — M76.01 GLUTEAL TENDINITIS OF RIGHT BUTTOCK: ICD-10-CM

## 2020-03-12 ENCOUNTER — OFFICE VISIT - HEALTHEAST (OUTPATIENT)
Dept: PHYSICAL THERAPY | Facility: REHABILITATION | Age: 80
End: 2020-03-12

## 2020-03-12 DIAGNOSIS — M79.604 RIGHT LEG PAIN: ICD-10-CM

## 2020-03-12 DIAGNOSIS — M76.01 GLUTEAL TENDINITIS OF RIGHT BUTTOCK: ICD-10-CM

## 2020-03-17 ENCOUNTER — OFFICE VISIT - HEALTHEAST (OUTPATIENT)
Dept: PHYSICAL THERAPY | Facility: REHABILITATION | Age: 80
End: 2020-03-17

## 2020-03-17 DIAGNOSIS — K64.4 EXTERNAL HEMORRHOIDS: ICD-10-CM

## 2020-03-17 DIAGNOSIS — M79.604 RIGHT LEG PAIN: ICD-10-CM

## 2020-03-17 DIAGNOSIS — R39.15 URINARY URGENCY: ICD-10-CM

## 2020-03-17 DIAGNOSIS — I10 ESSENTIAL HYPERTENSION: ICD-10-CM

## 2020-03-17 DIAGNOSIS — M76.01 GLUTEAL TENDINITIS OF RIGHT BUTTOCK: ICD-10-CM

## 2020-03-17 DIAGNOSIS — N81.89 PELVIC FLOOR WEAKNESS: ICD-10-CM

## 2020-03-17 DIAGNOSIS — C61 MALIGNANT NEOPLASM OF PROSTATE (H): ICD-10-CM

## 2020-03-17 DIAGNOSIS — N31.8 HYPERTONICITY OF BLADDER: ICD-10-CM

## 2020-05-26 ENCOUNTER — OFFICE VISIT - HEALTHEAST (OUTPATIENT)
Dept: INTERNAL MEDICINE | Facility: CLINIC | Age: 80
End: 2020-05-26

## 2020-05-26 DIAGNOSIS — K64.4 EXTERNAL HEMORRHOIDS: ICD-10-CM

## 2020-05-26 DIAGNOSIS — R39.15 URINARY URGENCY: ICD-10-CM

## 2020-05-26 DIAGNOSIS — N31.8 HYPERTONICITY OF BLADDER: ICD-10-CM

## 2020-05-26 DIAGNOSIS — Z85.46 PERSONAL HISTORY OF PROSTATE CANCER: ICD-10-CM

## 2020-05-26 DIAGNOSIS — I10 ESSENTIAL HYPERTENSION: ICD-10-CM

## 2020-05-26 LAB
ERYTHROCYTE [DISTWIDTH] IN BLOOD BY AUTOMATED COUNT: 13.9 % (ref 11–14.5)
HCT VFR BLD AUTO: 42.2 % (ref 40–54)
HGB BLD-MCNC: 13.7 G/DL (ref 14–18)
MCH RBC QN AUTO: 29.1 PG (ref 27–34)
MCHC RBC AUTO-ENTMCNC: 32.4 G/DL (ref 32–36)
MCV RBC AUTO: 90 FL (ref 80–100)
PLATELET # BLD AUTO: 251 THOU/UL (ref 140–440)
PMV BLD AUTO: 7.3 FL (ref 7–10)
PSA SERPL-MCNC: <0.1 NG/ML (ref 0–6.5)
RBC # BLD AUTO: 4.69 MILL/UL (ref 4.4–6.2)
WBC: 4.3 THOU/UL (ref 4–11)

## 2020-05-27 ENCOUNTER — COMMUNICATION - HEALTHEAST (OUTPATIENT)
Dept: INTERNAL MEDICINE | Facility: CLINIC | Age: 80
End: 2020-05-27

## 2020-06-26 ENCOUNTER — COMMUNICATION - HEALTHEAST (OUTPATIENT)
Dept: ADMINISTRATIVE | Facility: HOSPITAL | Age: 80
End: 2020-06-26

## 2020-06-30 ENCOUNTER — AMBULATORY - HEALTHEAST (OUTPATIENT)
Dept: RADIATION ONCOLOGY | Facility: HOSPITAL | Age: 80
End: 2020-06-30

## 2020-06-30 DIAGNOSIS — C61 MALIGNANT NEOPLASM OF PROSTATE (H): ICD-10-CM

## 2020-08-24 ENCOUNTER — RECORDS - HEALTHEAST (OUTPATIENT)
Dept: ADMINISTRATIVE | Facility: OTHER | Age: 80
End: 2020-08-24

## 2020-12-01 ENCOUNTER — OFFICE VISIT - HEALTHEAST (OUTPATIENT)
Dept: INTERNAL MEDICINE | Facility: CLINIC | Age: 80
End: 2020-12-01

## 2020-12-01 ENCOUNTER — COMMUNICATION - HEALTHEAST (OUTPATIENT)
Dept: INTERNAL MEDICINE | Facility: CLINIC | Age: 80
End: 2020-12-01

## 2020-12-01 DIAGNOSIS — K64.4 EXTERNAL HEMORRHOIDS: ICD-10-CM

## 2020-12-01 DIAGNOSIS — I10 ESSENTIAL HYPERTENSION: ICD-10-CM

## 2020-12-01 DIAGNOSIS — N31.8 HYPERTONICITY OF BLADDER: ICD-10-CM

## 2020-12-01 DIAGNOSIS — Z85.46 PERSONAL HISTORY OF PROSTATE CANCER: ICD-10-CM

## 2020-12-01 DIAGNOSIS — Z12.5 SCREENING PSA (PROSTATE SPECIFIC ANTIGEN): ICD-10-CM

## 2020-12-01 LAB — PSA SERPL-MCNC: 0.1 NG/ML (ref 0–6.5)

## 2021-02-03 ENCOUNTER — RECORDS - HEALTHEAST (OUTPATIENT)
Dept: GENERAL RADIOLOGY | Facility: CLINIC | Age: 81
End: 2021-02-03

## 2021-02-03 ENCOUNTER — OFFICE VISIT - HEALTHEAST (OUTPATIENT)
Dept: INTERNAL MEDICINE | Facility: CLINIC | Age: 81
End: 2021-02-03

## 2021-02-03 DIAGNOSIS — S00.511A ABRASION OF LIP, INITIAL ENCOUNTER: ICD-10-CM
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
## 2021-02-03 DIAGNOSIS — M54.50 ACUTE LEFT-SIDED LOW BACK PAIN WITHOUT SCIATICA: ICD-10-CM

## 2021-02-03 DIAGNOSIS — M54.50 LOW BACK PAIN: ICD-10-CM
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
## 2021-02-11 ENCOUNTER — COMMUNICATION - HEALTHEAST (OUTPATIENT)
Dept: INTERNAL MEDICINE | Facility: CLINIC | Age: 81
End: 2021-02-11

## 2021-02-11 DIAGNOSIS — N31.8 HYPERTONICITY OF BLADDER: ICD-10-CM

## 2021-02-11 DIAGNOSIS — I10 ESSENTIAL HYPERTENSION: ICD-10-CM

## 2021-02-16 ENCOUNTER — AMBULATORY - HEALTHEAST (OUTPATIENT)
Dept: NURSING | Facility: CLINIC | Age: 81
End: 2021-02-16

## 2021-03-08 ENCOUNTER — COMMUNICATION - HEALTHEAST (OUTPATIENT)
Dept: ADMINISTRATIVE | Facility: HOSPITAL | Age: 81
End: 2021-03-08

## 2021-03-09 ENCOUNTER — AMBULATORY - HEALTHEAST (OUTPATIENT)
Dept: NURSING | Facility: CLINIC | Age: 81
End: 2021-03-09

## 2021-04-14 ENCOUNTER — OFFICE VISIT - HEALTHEAST (OUTPATIENT)
Dept: RADIATION ONCOLOGY | Facility: CLINIC | Age: 81
End: 2021-04-14

## 2021-04-14 DIAGNOSIS — C61 MALIGNANT NEOPLASM OF PROSTATE (H): ICD-10-CM

## 2021-05-26 NOTE — PROGRESS NOTES
Patient here ambulatory accompanied by wife for daily radiation therapy treatment.  Patient having increased problems with urinary frequency and incontinence.  Asked by therapist to contact Dr. Merino as patient cannot hold his bladder.  Patient leaking urine even with clamp placed by therapist.  Dr. Merino out of office contacted Dr. Denny.  She would like patient to be seen by urology and maybe place a catheter that will be clamped for daily treatment.    Called [facility] and they have no available physician or PA to see patient today.  Nursing staff sent message to patients physician Dr. Alejandro to contact us on Monday morning about seeing patient for urinary symptoms.  Dr. Alejandro is out of the office today.    Patient chose to leave without treatment today and knows we will add this day to the end.    Patient had lots of questions and concerns about being anxious while in the room and on the treatment table.  Feels his BP is elevated every day that he has to be in treatment and is worried about this.  Talked to patient about seeing our psychotherapist Yazmin Florentino to help with coping strategies and dealing with his anxiety over treatment.  Patient asked questions about stopping this treatment and doing nothing or if he chose to stop and do surgery.  Explained to patient that the urinary symptoms that he was having prior to treatment would not be resolved by the treatment.  Patient states he is having a very difficult time coping with his decisions and the treatment.  Patient verbalized understanding.  Patient left ambulatory.

## 2021-05-27 NOTE — PROGRESS NOTES
RADIATION ONCOLOGY WEEKLY TREATMENT VISIT NOTE      Assessment / Impression       1. Malignant neoplasm of prostate (H)       Tolerating radiation therapy well.  All questions and concerns addressed.    Plan:     Continue radiation treatment as prescribed.  I have explained to the patient that the second part of radiation therapy is only focused on prostate gland.  It is not as a critical to keep the bladder fold during the radiation therapy.  However, I will still recommend patient to keep bladder full if it is all possible.    Subjective:      HPI: Jorge Dupree is a 78 y.o. male with    1. Malignant neoplasm of prostate (H)         The following portions of the patient's history were reviewed and updated as appropriate: allergies, current medications, past family history, past medical history, past social history, past surgical history and problem list.    Assessment                  Body Site: Pelvis Site: Prostate  Stereotactic Radiosurgery: No  Concurrent Therapy: No  Today's Dose: 4500  Total Dose for Pelvis: 7920  Today's Fraction/Total Fraction Pelvis: 25/44  Drainage: 0: Absent  Diarrhea W/O Colostomy: 1: Increase of less than 4 stools/day over pretreatment  Constipation: 0: None  Proctitis: 1: Increased stool frequency, occasional blood-streaked stools or rectal discomfort (including hemorrhoids) not requiring medication  Urinary frequency and/or urgency: 2: Increase more than two times normul but less than hourly  Urinary Retention: 0 : Normal  Urinary Incontinence: 2: 1-2 episodes of urinary incontinence in 24 hrs  Dysuria: 0: None  Nocturia: 4-6  Urine Color Change: 0: Normal                                            Sexuality Alteration                 Emotional Alteration Copin: Effective  Comfort Alteration KPS: 90% Can perform normal activity, minor signs of disease  Fatigue (ONS scale) : 7: Extreme Fatigue  Pain Location: denies   Nutrition Alteration Anorexia: 0: None  Nausea: 0:  None  Vomitin: None  Skin Alteration Skin Sensation: 0: No problem  Skin Reaction: 0: None  AUA Assessment                                  Accompanied by       Objective:     Exam: Examination reviewed no significant changes.    Vitals:    19 0919   BP: 141/73   Pulse: 77   Temp: 97.7  F (36.5  C)   TempSrc: Oral   SpO2: 97%   Weight: 180 lb 1.6 oz (81.7 kg)       Wt Readings from Last 8 Encounters:   19 180 lb 1.6 oz (81.7 kg)   19 179 lb 9.6 oz (81.5 kg)   19 180 lb 11.2 oz (82 kg)   19 182 lb 12.8 oz (82.9 kg)   19 180 lb 12.8 oz (82 kg)   19 181 lb 11.2 oz (82.4 kg)   19 181 lb 3.2 oz (82.2 kg)   19 189 lb 4.8 oz (85.9 kg)       General: Alert and oriented, in no acute distress  Jorge has mild Erythema.  Aria chart and setup information reviewed    Lorenza Merino MD

## 2021-05-27 NOTE — PROGRESS NOTES
RADIATION ONCOLOGY WEEKLY TREATMENT VISIT NOTE      Assessment / Impression       1. Prostate cancer (H)       Cancer Staging  No matching staging information was found for the patient.   Tolerating radiation therapy well.  All questions and concerns addressed.    Plan:     Continue radiation treatment as prescribed.  Radiation: Site: Prostate  Stereotactic Radiosurgery: No  Concurrent Therapy: No  Today's Dose: 5400  Total Dose for Pelvis: 7920  Today's Fraction/Total Fraction Pelvis:       Subjective:      HPI: Jorge Dupree is a 78 y.o. male with    1. Prostate cancer (H)         The following portions of the patient's history were reviewed and updated as appropriate: allergies, current medications, past family history, past medical history, past social history, past surgical history and problem list.    Assessment                  Body Site: Pelvis Site: Prostate  Stereotactic Radiosurgery: No  Concurrent Therapy: No  Today's Dose: 5400  Total Dose for Pelvis: 7920  Today's Fraction/Total Fraction Pelvis:   Drainage: 0: Absent  Diarrhea W/O Colostomy: 1: Increase of less than 4 stools/day over pretreatment  Constipation: 0: None  Proctitis: 0: None  Urinary frequency and/or urgency: 1: Increase in frequency or nocturia up to two times normal  Urinary Retention: 0 : Normal  Urinary Incontinence: 2: 1-2 episodes of urinary incontinence in 24 hrs  Dysuria: 0: None  Nocturia: 4  Urine Color Change: 0: Normal   Now receiving radiation to the reduced-size prostate boost target volume. His urgency of  urination is now much improved, and his incontinence is less.                                   Sexuality Alteration                 Emotional Alteration Copin: Effective  Comfort Alteration KPS: 90% Can perform normal activity, minor signs of disease  Fatigue (ONS scale) : 5: Moderate Fatigue  Pain Location: some burning sensation in low pelvis   Pain Intensity. Rate degree of pain ranging from 0 (no pain)  to 10 (severe pain) : 0-2  Pain Description: Burning - Burning, hot, fire type pain  Pain Intervention: 0: None  Effectiveness of pain intervention: 4: Pain relieved 100%   Nutrition Alteration Anorexia: 0: None  Nausea: 0: None  Vomitin: None  Skin Alteration Skin Sensation: 0: No problem  Skin Reaction: 0: None                         Accompanied by: wife       Objective:     Exam:     Vitals:    19 0931   BP: 143/70   Pulse: 74   Temp: 98  F (36.7  C)   TempSrc: Oral   SpO2: 96%   Weight: 179 lb 9.6 oz (81.5 kg)       Wt Readings from Last 8 Encounters:   19 179 lb 9.6 oz (81.5 kg)   19 180 lb 1.6 oz (81.7 kg)   19 179 lb 9.6 oz (81.5 kg)   19 180 lb 11.2 oz (82 kg)   19 182 lb 12.8 oz (82.9 kg)   19 180 lb 12.8 oz (82 kg)   19 181 lb 11.2 oz (82.4 kg)   19 181 lb 3.2 oz (82.2 kg)       General: Alert and oriented, in no acute distress, and in a pleasant mood.  Jorge has no erythema.    Treatment Summary to Date     Cumulative dose to prostate: 5400 cGy out of 7920 cGy precribed total.  Aria chart and setup information reviewed    Alex Velazquez MD

## 2021-05-27 NOTE — PROGRESS NOTES
Patient has been on a break since last week due to treatment symptoms and will resume a new boost treatment tomorrow.  Today here for OTV and to answer questions about the new plan prior to starting.  Patient continues to be very anxious and has lots of questions.  Patient seems to have some memory issues as he repeats same questions frequently.  States he had some diarrhea over the weekend and used imodium with relief.  States he is trying to follow the low fiber diet instructions.  Told patient to keep a food log instead of trying to remember what he eats every day and then if he has diarrhea he can refer back to the log and see if a particular food is triggering the diarrhea.    Have encouraged patient to see Yazmin Florentino, psychotherapy, for the anxiety patient has expressed he has in the treatment room daily but he has not wanted to do that as of yet.

## 2021-05-27 NOTE — PROGRESS NOTES
RADIATION ONCOLOGY WEEKLY TREATMENT VISIT NOTE      Assessment / Impression       1. Malignant neoplasm of prostate (H)       Cancer Staging  No matching staging information was found for the patient.   Tolerating radiation therapy well.  All questions and concerns addressed.  Doing well  33/44 fx planned total 7920cGy    Plan:     Continue radiation treatment as prescribed.  Radiation: Site: Prostate  Stereotactic Radiosurgery: No  Concurrent Therapy: No  Today's Dose: 5940  Total Dose for Pelvis: 7920  Today's Fraction/Total Fraction Pelvis: 33/44    Proceed per plan    Subjective:      HPI: Jorge Dupree is a 78 y.o. male with    1. Malignant neoplasm of prostate (H)         The following portions of the patient's history were reviewed and updated as appropriate: allergies, current medications, past family history, past medical history, past social history, past surgical history and problem list.    Assessment                  Body Site: Pelvis Site: Prostate  Stereotactic Radiosurgery: No  Concurrent Therapy: No  Today's Dose: 5940  Total Dose for Pelvis: 7920  Today's Fraction/Total Fraction Pelvis: 33/44  Drainage: 0: Absent  Diarrhea W/O Colostomy: 1: Increase of less than 4 stools/day over pretreatment  Constipation: 0: None  Proctitis: 0: None  Urinary frequency and/or urgency: 1: Increase in frequency or nocturia up to two times normal  Urinary Retention: 0 : Normal  Urinary Incontinence: 2: 1-2 episodes of urinary incontinence in 24 hrs  Dysuria: 0: None  Nocturia: 5-6  Urine Color Change: 0: Normal                                            Sexuality Alteration                 Emotional Alteration Copin: Effective  Comfort Alteration KPS: 90% Can perform normal activity, minor signs of disease  Fatigue (ONS scale) : 5: Moderate Fatigue  Pain Location: low pelvis  Pain Intensity. Rate degree of pain ranging from 0 (no pain) to 10 (severe pain) : 0-4  Pain Description: Burning - Burning,  hot, fire type pain  Pain Intervention: 0: None  Effectiveness of pain intervention: 4: Pain relieved 100%   Nutrition Alteration Anorexia: 0: None  Nausea: 0: None  Vomitin: None  Skin Alteration Skin Sensation: 0: No problem  Skin Reaction: 0: None  AUA Assessment                                  Accompanied by       Objective:     Exam:     Vitals:    19 0911   BP: 130/67   Pulse: 73   Temp: 98  F (36.7  C)   TempSrc: Oral   SpO2: 97%   Weight: 176 lb (79.8 kg)       Wt Readings from Last 8 Encounters:   19 176 lb (79.8 kg)   19 179 lb 9.6 oz (81.5 kg)   19 180 lb 1.6 oz (81.7 kg)   19 179 lb 9.6 oz (81.5 kg)   19 180 lb 11.2 oz (82 kg)   19 182 lb 12.8 oz (82.9 kg)   19 180 lb 12.8 oz (82 kg)   19 181 lb 11.2 oz (82.4 kg)       General: Alert and oriented, in no acute distress  Jorge has mild Erythema.    Treatment Summary to Date    [facility]ria chart and setup information reviewed    Alfredo Salas MD

## 2021-05-27 NOTE — PROGRESS NOTES
RADIATION ONCOLOGY WEEKLY TREATMENT VISIT NOTE      Assessment / Impression       1. Prostate cancer (H)  Urinalysis-UC if Indicated    Ambulatory referral to Psychology     Patient experienced more frequent urination and urgency last night.  He gets up 7-8 times during the night.     Plan:     We will obtain UA UC to rule out UTI.  Patient is also referred to psychology for evaluation and possible treatment to reduce his stress level.    Subjective:      HPI: Jorge Dupree is a 78 y.o. male with    1. Prostate cancer (H)  Urinalysis-UC if Indicated    Ambulatory referral to Psychology       The following portions of the patient's history were reviewed and updated as appropriate: allergies, current medications, past family history, past medical history, past social history, past surgical history and problem list.    Assessment                  Body Site: Pelvis Site: Prostate  Stereotactic Radiosurgery: No  Concurrent Therapy: No  Today's Dose: 5040  Total Dose for Pelvis: 7920  Today's Fraction/Total Fraction Pelvis:   Drainage: 0: Absent  Diarrhea W/O Colostomy: 1: Increase of less than 4 stools/day over pretreatment  Constipation: 0: None  Proctitis: 0: None  Urinary frequency and/or urgency: 3: 3 - 5 episodes(urine sample sent for [facility])  Urinary Retention: 0 : Normal  Urinary Incontinence: 3: 3 -5  Dysuria: 0: None  Nocturia: 11(urine sample sent)  Urine Color Change: 0: Normal                                            Sexuality Alteration                 Emotional Alteration Copin: Effective  Comfort Alteration KPS: 90% Can perform normal activity, minor signs of disease  Fatigue (ONS scale) : 7: Extreme Fatigue  Pain Location: denies   Nutrition Alteration Anorexia: 0: None  Nausea: 0: None  Vomitin: None  Skin Alteration Skin Sensation: 0: No problem  Skin Reaction: 0: None  AUA Assessment                                  Accompanied by       Objective:     Exam: Examination reviewed  no significant changes.    Vitals:    04/05/19 0950   BP: (!) 180/92   Pulse: 87   Temp: 98.2  F (36.8  C)   TempSrc: Oral   SpO2: 98%       Wt Readings from Last 8 Encounters:   04/02/19 180 lb 1.6 oz (81.7 kg)   03/26/19 179 lb 9.6 oz (81.5 kg)   03/19/19 180 lb 11.2 oz (82 kg)   03/14/19 182 lb 12.8 oz (82.9 kg)   03/12/19 180 lb 12.8 oz (82 kg)   03/05/19 181 lb 11.2 oz (82.4 kg)   02/26/19 181 lb 3.2 oz (82.2 kg)   02/19/19 189 lb 4.8 oz (85.9 kg)       General: Alert and oriented, in no acute distress  Aria chart and setup information reviewed    Lorenza Merino MD

## 2021-05-27 NOTE — PROGRESS NOTES
RADIATION ONCOLOGY WEEKLY TREATMENT VISIT NOTE      Assessment / Impression       Tolerating radiation therapy well.  All questions and concerns addressed.      Plan:     Continue radiation treatment as prescribed.  Radiation: Site: Prostate  Stereotactic Radiosurgery: No  Concurrent Therapy: No  Today's Dose: 4320  Total Dose for Pelvis: 7920  Today's Fraction/Total Fraction Pelvis:       Subjective:      HPI: Jorge Dupree is a 78 y.o. male with    1. Prostate cancer (H)         The following portions of the patient's history were reviewed and updated as appropriate: allergies, current medications, past family history, past medical history, past social history, past surgical history and problem list.    Assessment                  Body Site: Pelvis Site: Prostate  Stereotactic Radiosurgery: No  Concurrent Therapy: No  Today's Dose: 4320  Total Dose for Pelvis: 7920  Today's Fraction/Total Fraction Pelvis:   Drainage: 0: Absent  Diarrhea W/O Colostomy: 1: Increase of less than 4 stools/day over pretreatment  Constipation: 0: None  Proctitis: 0: None  Urinary frequency and/or urgency: 2: Increase more than two times normul but less than hourly  Urinary Retention: 0 : Normal  Urinary Incontinence: 2: 1-2 episodes of urinary incontinence in 24 hrs  Dysuria: 0: None  Nocturia: 5-6  Urine Color Change: 0: Normal                           : no                 Sexuality Alteration                 Emotional Alteration Copin: Effective  Comfort Alteration KPS: 90% Can perform normal activity, minor signs of disease  Fatigue (ONS scale) : 6: Moderate Fatigue  Pain Location: denies   Nutrition Alteration Anorexia: 0: None  Nausea: 0: None  Vomitin: None  Skin Alteration Skin Sensation: 0: No problem  Skin Reaction: 0: None                                    Accompanied by: wife           Objective:     Exam:     Vitals:    19 0959   BP: 143/77   Pulse: 85   Temp: 98.2  F (36.8  C)    TempSrc: Oral   SpO2: 97%   Weight: 179 lb 9.6 oz (81.5 kg)       Wt Readings from Last 8 Encounters:   03/26/19 179 lb 9.6 oz (81.5 kg)   03/19/19 180 lb 11.2 oz (82 kg)   03/14/19 182 lb 12.8 oz (82.9 kg)   03/12/19 180 lb 12.8 oz (82 kg)   03/05/19 181 lb 11.2 oz (82.4 kg)   02/26/19 181 lb 3.2 oz (82.2 kg)   02/19/19 189 lb 4.8 oz (85.9 kg)   01/16/19 189 lb 11.2 oz (86 kg)       General: Alert and oriented, in no acute distress  Jorge has no erythema from the radiation..    Treatment Summary to Date    Cumulative radiation dose is 4320 cGy  Aria chart and setup information reviewed    Patient was advised to stop taking tamsulosin, to see if that medication change might decrease his  urinary frequency, urgency, and incontinence. He has been taking it for many years, and already took a dose this  morning. He will continue taking oxybutynin and finasteride. He has stopped taking docusats because stools are loose.       Alex Velazquez MD

## 2021-05-27 NOTE — PROGRESS NOTES
RADIATION ONCOLOGY WEEKLY TREATMENT VISIT NOTE      Assessment / Impression       1. Malignant neoplasm of prostate (H)       Was unable to deliver radiation today due to 2cm+ shifts    Plan:   Jorge unable to hold his urine despite properly placed clamp.  I spoke with Dr Alejandro on Monday and he was seen at [facility] for a temporary catheter.  They saw a PA who they understood to hear that catheter would be permanent.  That was not the intent nor discussion with Dr Alejandro.    Attempted boost today but Jorge unable to hold urine despite proper clamp placement and fiducials were 2+cm off.  Jorge was in tears from embarrassment and anxiety. After detailed conversation with Jorge and his wife decided to sim with bladder empty and replan.  Radiation on hold until Dr Merino evaluates on Tuesday.    Resume FLomax    Continue radiation treatment as prescribed.  Radiation: Site: Prostate  Stereotactic Radiosurgery: No  Concurrent Therapy: No  Today's Dose: 4500  Total Dose for Pelvis: 7920  Today's Fraction/Total Fraction Pelvis: 25/44        Subjective:      HPI: Jorge Dupree is a 78 y.o. male with    1. Malignant neoplasm of prostate (H)         The following portions of the patient's history were reviewed and updated as appropriate: allergies, current medications, past family history, past medical history, past social history, past surgical history and problem list.    Assessment                  Body Site: Pelvis Site: Prostate  Stereotactic Radiosurgery: No  Concurrent Therapy: No  Today's Dose: 4500  Total Dose for Pelvis: 7920  Today's Fraction/Total Fraction Pelvis: 25/44                               Objective:     Exam:     There were no vitals filed for this visit.    Wt Readings from Last 8 Encounters:   03/26/19 179 lb 9.6 oz (81.5 kg)   03/19/19 180 lb 11.2 oz (82 kg)   03/14/19 182 lb 12.8 oz (82.9 kg)   03/12/19 180 lb 12.8 oz (82 kg)   03/05/19 181 lb 11.2 oz (82.4 kg)   02/26/19 181 lb 3.2 oz (82.2  kg)   02/19/19 189 lb 4.8 oz (85.9 kg)   01/16/19 189 lb 11.2 oz (86 kg)       General: Alert and oriented, in no acute distress  Jorge has no Erythema.    Treatment Summary to Date    Aria chart and setup information reviewed    Korina Denny MD

## 2021-05-27 NOTE — PROGRESS NOTES
Placed call to Dr. Alejandro's office at [facility] to follow up on Friday's request for a visit for patient.  Dr. Alejandro was paged and called back and spoke with Dr. Denny about patient urinary symptoms.  Patient directed to go to [facility] from here to be seen.  Dr. Alejandro was calling clinic to have patient seen and have catheter placed for remainder of treatment.  Therapist notified of catheter placement and that tomorrow we would be clamping the catheter instead of using a penile clamp to help patient with bladder fill for daily treatment.

## 2021-05-27 NOTE — PROGRESS NOTES
Patient here ambulatory for daily radiation treatment.  Asked by radiation therapist to see the patient as he stated he was not feeling well and was dizzy getting off the table.  Per therapist the patient had to sit down twice when leaving the room to wait for the dizziness to improve.  Patient states he was up about 11 times last night to urinate and just was not feeling well.  Patient denies fever then and has no fever today.  BP is high.    Seen by Dr. Merino.  Urine sample sent for [facility].

## 2021-05-28 NOTE — PROGRESS NOTES
RADIATION ONCOLOGY WEEKLY TREATMENT VISIT NOTE      Assessment / Impression       1. Malignant neoplasm of prostate (H)       Tolerating radiation therapy well.  His bladder functioning has been stable.  The patient has been tolerated penile clamp well during the therapy.  All questions and concerns addressed.    Plan:     Continue radiation treatment as prescribed.    Subjective:      HPI: Jorge Dupree is a 78 y.o. male with    1. Malignant neoplasm of prostate (H)         The following portions of the patient's history were reviewed and updated as appropriate: allergies, current medications, past family history, past medical history, past social history, past surgical history and problem list.    Assessment                  Body Site: Pelvis Site: Prostate  Stereotactic Radiosurgery: No  Concurrent Therapy: No  Today's Dose: 6840  Total Dose for Pelvis: 7920  Today's Fraction/Total Fraction Pelvis: 38/44  Drainage: 0: Absent  Diarrhea W/O Colostomy: 1: Increase of less than 4 stools/day over pretreatment  Constipation: 0: None  Proctitis: 1: Increased stool frequency, occasional blood-streaked stools or rectal discomfort (including hemorrhoids) not requiring medication  Urinary frequency and/or urgency: 1: Increase in frequency or nocturia up to two times normal  Urinary Retention: 0 : Normal  Urinary Incontinence: 2: 1-2 episodes of urinary incontinence in 24 hrs  Dysuria: 0: None  Nocturia: 4  Urine Color Change: 0: Normal                                            Sexuality Alteration                 Emotional Alteration Copin: Effective  Comfort Alteration KPS: 90% Can perform normal activity, minor signs of disease  Fatigue (ONS scale) : 5: Moderate Fatigue  Pain Location: low pelvis  Pain Intensity. Rate degree of pain ranging from 0 (no pain) to 10 (severe pain) : 0-5  Pain Description: Burning - Burning, hot, fire type pain  Pain Intervention: 0: None  Effectiveness of pain intervention:  4: Pain relieved 100%   Nutrition Alteration Anorexia: 0: None  Nausea: 0: None  Vomitin: None  Skin Alteration Skin Sensation: 0: No problem  Skin Reaction: 0: None  AUA Assessment                                  Accompanied by       Objective:     Exam: Examination reviewed no significant changes.    Vitals:    19 0927   BP: 143/74   Pulse: 79   Temp: 98.1  F (36.7  C)   TempSrc: Oral   SpO2: 99%   Weight: 176 lb 9.6 oz (80.1 kg)       Wt Readings from Last 8 Encounters:   19 176 lb 9.6 oz (80.1 kg)   19 176 lb (79.8 kg)   19 179 lb 9.6 oz (81.5 kg)   19 180 lb 1.6 oz (81.7 kg)   19 179 lb 9.6 oz (81.5 kg)   19 180 lb 11.2 oz (82 kg)   19 182 lb 12.8 oz (82.9 kg)   19 180 lb 12.8 oz (82 kg)       General: Alert and oriented, in no acute distress  Jorge has mild Erythema.  Aria chart and setup information reviewed    Lorenza Merino MD

## 2021-05-28 NOTE — PROGRESS NOTES
[facility]   Initial Evaluation          [facility] Certification Request    April 22, 2019      Patient: Jorge Dupree  MR Number: [id number]  YOB: 1940  Date of Visit: 4/22/2019      Dear Dr. Lorenza Merino MD:    Thank you for this referral.   We are seeing Jorge Dupree for Physical Therapy of urinary urgency.    Medicare and/or Medicaid requires physician review and approval of the treatment plan. Please review the plan of care and verify that you agree with the therapy plan of care by co-signing this note.      Plan of Care  Authorization / Certification Start Date: 04/22/19  Authorization / Certification End Date: 07/20/19  Authorization / Certification Number of Visits: 10  Communication with: Referral Source  Patient Related Instruction: Nature of Condition;Treatment plan and rationale;Basis of treatment;Self Care instruction;Next steps;Expected outcome;Precautions  Times per Week: 1  Number of Weeks: 6  Number of Visits: 6  Discharge Planning: to include self managment  Precautions / Restrictions : prostate cancer  Therapeutic Exercise: Pelvic Floor retraining;ROM;Stretching;Strengthening      Goals:  Pt. will demonstrate/verbalize independence in self-management of condition in : 4 weeks    Pt will: no longer need to wear protective pads in 4 weeks  Pt will: only need to wake at night 2-3 times to urinate in 4 weeks        If you have any questions or concerns, please don't hesitate to call.    Sincerely,      Chiquita Nance, PT        Physician recommendation:     __X_ Follow therapist's recommendation        ___ Modify therapy      *Physician co-signature indicates they certify the need for these services furnished within this plan and while under their care.      Patient Name: Jorge Dupree  Date of evaluation: 4/22/2019  Referral Diagnosis: urinary urgency  Referring provider: Lorenza Merino MD  Visit Diagnosis:     ICD-10-CM    1. Urinary urgency R39.15    2. Pelvic floor  weakness N81.89    3. Malignant neoplasm of prostate (H) C61        Assessment:      Pt. is appropriate for skilled PT intervention as outlined in the Plan of Care (POC).  Pt. is a good candidate for skilled PT services to improve pain levels and function.    Goals:  Pt. will demonstrate/verbalize independence in self-management of condition in : 4 weeks    Pt will: no longer need to wear protective pads in 4 weeks  Pt will: only need to wake at night 2-3 times to urinate in 4 weeks      Patient's expectations/goals are realistic    Barriers to Learning or Achieving Goals:  No Barriers.    Goals and plan of care were set up in collaboration with the patient.       Plan / Patient Instructions:        Plan of Care:   Authorization / Certification Start Date: 04/22/19  Authorization / Certification End Date: 07/20/19  Authorization / Certification Number of Visits: 10  Communication with: Referral Source  Patient Related Instruction: Nature of Condition;Treatment plan and rationale;Basis of treatment;Self Care instruction;Next steps;Expected outcome;Precautions  Times per Week: 1  Number of Weeks: 6  Number of Visits: 6  Discharge Planning: to include self managment  Precautions / Restrictions : prostate cancer  Therapeutic Exercise: Pelvic Floor retraining;ROM;Stretching;Strengthening      Plan for next visit: Continue with urinary urgency training and pelvic floor strengthening     Subjective:         History of Present Illness:    Jorge is a 78 y.o. male who presents to therapy today with complaints of urinary urgency.  Will leak urine and needs to wear a pad daily.  Changes pad 1-2 times a day.  Is going through radiation now. Urinary urgency has been going on for several years but it has gotten worse with starting radiation   Date of onset/duration of symptoms is 2/2019 when he started radiation.  He describes previous level of function as limited with some urinary urgency   Will often go to the bathroom to avoid  accidents  Denies having any stress incontinence.    Reports getting up at night 4-5 times a night to urinate.      Pain Ratin}    Functional limitations are described as occurring with:   urinary urgency and leakage       Objective:      Examination    Patient was educated on what the exam today would consist of.  Consent was obtained for performing an external pelvic floor exam..    1. Urinary urgency     2. Pelvic floor weakness     3. Malignant neoplasm of prostate (H)       Precautions/Restrictions: prostate cancer  Involved side: Bilateral  Posture Observation:      General sitting posture is  normal.  General standing posture is normal.    Observation:     Breathing -  Slight chest breather.  Able to correct with cueing   observation:     Kegel - able to perform.  Determined by external palpation          Palpation:   External - not performed    Pubic symphasis     Perineal body    Superficial transverse perineal muscle     Pubococcygeus    Iliococcygeus    Coccyx     Bulbocavernosus    Ischiocavernosus           Treatment Today     TREATMENT MINUTES COMMENTS   Evaluation 25 Low complexity   Self-care/ Home management     Manual therapy     Neuromuscular Re-education     Therapeutic Activity     Therapeutic Exercises 30 Exercises:  Exercise #1: diaphragmatic breathing 10 times   Exercise #2: Kegel 3 quick contractions and 3 gradual contractions 3-5 times 3-5 times a day.    Discussed causes of urinary urgency and treatment strategies (deep breathing and 3 quick contractions)    Gait training     Modality__________________                Total 55    Blank areas are intentional and mean the treatment did not include these items.     PT Evaluation Code: (Please list factors)  Patient History/Comorbidities: HTN, prostate cancer  Examination: urinary urgency, pelvic floor weakenss  Clinical Presentation: stable  Clinical Decision Making: low    Patient History/  Comorbidities Examination  (body structures and  functions, activity limitations, and/or participation restrictions) Clinical Presentation Clinical Decision Making (Complexity)   No documented Comorbidities or personal factors 1-2 Elements Stable and/or uncomplicated Low   1-2 documented comorbidities or personal factor 3 Elements Evolving clinical presentation with changing characteristics Moderate   3-4 documented comorbidities or personal factors 4 or more Unstable and unpredictable High              Chiquita Nance, PT  4/22/2019  10:16 AM

## 2021-05-28 NOTE — PROGRESS NOTES
Patient here ambulatory accompanied by family for final radiation therapy treatment.  Patient continues to have bowel and bladder symptoms but feels it has improved since starting the smaller field size.  Written discharge instructions reviewed and given to patient and family.  Follow up with Dr. Merino in about 4 to 6 weeks with a PSA.  Patient able to verbalize understanding and left ambulatory with copy of appointments.

## 2021-05-28 NOTE — TELEPHONE ENCOUNTER
Called patient for routine follow up call s/p radiation for his prostate cancer.  Left message telling patient he did not need to call back unless he had questions or concerns.  Follow up appointment information left with call back number.

## 2021-05-28 NOTE — PROGRESS NOTES
"[facility] Daily Progress     Patient Name: Jorge Dupree  Date: 2019  Visit #: 2  PTA visit #:   Referral Diagnosis: overactive bladder  Referring provider: Lorenza Merino MD  Visit Diagnosis:     ICD-10-CM    1. Urinary urgency R39.15    2. Pelvic floor weakness N81.89    3. Malignant neoplasm of prostate (H) C61          Assessment:     HEP/POC compliance is  good .  Patient demonstrates understanding/independence with home program.  Patient is benefitting from skilled physical therapy and is making steady progress toward functional goals.  Patient is appropriate to continue with skilled physical therapy intervention, as indicated by initial plan of care.    Goal Status:  Pt. will demonstrate/verbalize independence in self-management of condition in : 4 weeks    Pt will: no longer need to wear protective pads in 4 weeks  Pt will: only need to wake at night 2-3 times to urinate in 4 weeks      Some improvement with urinary urgency.  Would like to continue with the exercises on his own and then schedule more PT if needed.          Plan / Patient Education:     Continue with initial plan of care.  Progress with home program as tolerated.    DC mid  if not other appointments are set up.    Subjective:     Pain Ratin      Feels exercises are helpful.     Has urgency at night when he wakes.Uses exercises to control it.    Has 2 -3 \"accidents\" a day.    Often happens when driving on the highway.        Objective:       Treatment Today    TREATMENT MINUTES COMMENTS   Evaluation     Self-care/ Home management     Manual therapy     Neuromuscular Re-education     Therapeutic Activity     Therapeutic Exercises 25 Exercises:  Exercise #1: diaphragmatic breathing 10 times   Exercise #2: Kegel 3 quick contractions and 3 gradual contractions 3-5 times 3-5 times a day.  Exercise #3: Knack techniuqe 10 times a day    Reviewed the mechanics behind urinary urgency.      Added Knack technique   Gait training   "   Modality__________________                Total 25    Blank areas are intentional and mean the treatment did not include these items.       Chiquita Nance, PT  5/6/2019

## 2021-05-28 NOTE — TELEPHONE ENCOUNTER
I called Jorge and EMMANUEL for him to return my call for a navigator follow up call.  I called his cell phone later and was able to reach him.  He states things are going well at this time.  He was confused by the billing section on [facility].  I informed him he would get a paper bill if he owed anything, but if he had any questions he can contact the billing/pt accounting office.  If his questions arent being answered he can always call me as well.  He does not have any needs at this time.

## 2021-05-28 NOTE — PROGRESS NOTES
Radiation Treatment Summary    Patient: Jorge Dupree   MRN: [id number]  : 1940  Care Provider: Lorenza Merino    Date of Service: 2019      Itz Alejandro MD  [address]  [address]  [place] [address]                   Dear Dr. Alejandro:     Your patient Mr. Jorge Dupree complete his radiation therapy on 2019. As you know Mr. Dupree is a 78 y.o. 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  Patient received short-term hormone therapy and definitive external beam radiation therapy for his prostate cancer and had radiation therapy in our clinic with a total dose of 7942 cGy in 45 treatments given from 2019-2019.  The patient did have significant dysuria before and during the radiation therapy for which he required penile clamp to maintain his bladder full during the therapy.  Patient otherwise has been stable at the end of therapy.  He is scheduled to return to radiation oncology in 4 weeks for routine post therapy office follow-up and restaging PSA.    Again, thank you very much for the referral and allowing me to participate in the care of this patient.  If you have any questions about this patient, please do not has to call.    Sincerely,      Lorenza Merino MD, PhD  Department of Radiation Oncology   [facility]  Tel: [phone]  Page: [phone]    [facility]  [address] [place] [address]     [facility]  [address] [facility] [place] [address]    CC:  Patient Care Team:  Dahlia Álvarez MD as PCP - General (Internal Medicine)  Nika Granados RN as Oncology Nurse Navigator  Lorenza Merino MD as Physician (Radiation Oncology)  Itz Alejandro MD as Physician (Urology)

## 2021-05-29 NOTE — PROGRESS NOTES
[facility] Radiation Oncology Follow Up     Patient: Jorge Dupree  MRN: [id number]  Date of Service: 06/05/2019       DISEASE TREATED: Unfavorable intermediate risk prostate cancer, clinical stage T2 cN0 M0, Shemar grade 4+3=7 and 3+4 =7 involving up to 90% of biopsy tissue bilaterally, and pretherapy PSA of 10.2.      TYPE OF RADIATION THERAPY ADMINISTERED:  7942 cGy in 45 treatments given from 2/19/2019-5/2/2019.      INTERVAL SINCE COMPLETION OF RADIATION THERAPY: 1 month.      SUBJECTIVE:  Mr. Dupree is a 78 y.o. male who has been in his usual state of good health until recently.  The patient presented with history of abnormal elevation PSA from 4.1 in 5/2015 to 7 in 9/2016 to 8.48/2017 and to 10.2 in 8/2018.  The patient had some moderate urinary urgency over the past a few years and denies any other symptoms at the time of evaluation.  His initial rectal exam is reviewed 2+ enlarged prostate gland with left lobe firmness.  Initial MRI prostate on 9/20/2018 reviewed Pi-Rads 5 lesion in the left posterior lateral peripheral zone highly suspicious for malignancy.  The estimated prostate volume was 51 cc.  The lesion makes broad capsular contact with the blurring at the neurovascular bundle origin.  No evidence of ivania and skeletal metastasis.  He underwent transrectal ultrasound study and biopsy on 11/1/2018.  The pathology revealed the prostatic adenocarcinoma, Shemar grade 4+3 = 7 and 3+4 = 7 involving up to 90% biopsy tissue in 15/15 cores bilaterally.  He underwent staging workup including CT abdomen pelvics and the bone scan which showed no radiographic evidence of ivania and systemic metastasis. Patient received short-term hormone therapy and definitive external beam radiation therapy for his prostate cancer and had radiation therapy in our clinic with a total dose of 7942 cGy in 45 treatments given from 2/19/2019-5/2/2019.  The patient did have significant dysuria before and during the radiation therapy for  which he required penile clamp to maintain his bladder full during the therapy.    The patient has been gradually recovering well since completion of the radiation therapy.  He is currently is followed with physical therapy with significant bladder control improvement.  He otherwise denies any bowel irritations and rectal bleeding at the time of evaluation.  He is here for routine postradiation therapy office follow-up.    Medications were reviewed and are up to date on EPIC.    The following portions of the patient's history were reviewed and updated as appropriate: allergies, current medications, past family history, past medical history, past social history, past surgical history and problem list.    Review of Systems:      General  Constitutional (WDL): Exceptions to WDL  Fatigue: Fatigue relieved by rest  EENT  Eye Disorder (WDL): All eye disorder elements are within defined limits(wears glasses)  Ear Disorder (WDL): All ear disorder elements are within defined limits  Respiratory   Respiratory (WDL): Within Defined Limits  Cardiovascular  Cardiovascular (WDL): All cardiovascular elements are within defined limits  Endocrine     Gastrointestinal  Gastrointestinal (WDL): Exceptions to WDL  Musculoskeletal  Musculoskeletal and Connetive Tissue Disorders (WDL): Exceptions to WDL  Arthralgia: Mild pain(chronic back)  Integumentary               Integumentary (WDL): All integumentary elements are within defined limits  Neurological  Neurosensory (WDL): All neurosensory elements are within defined limits  Psychological/Emotional   Patient Coping: Accepting  Hematological/Lymphatic  Lymph (WDL): All lymph disorder elements are within defined limits  Dermatologic     Genitourinary/Reproductive  Genitourinary (WDL): Exceptions to WDL  Urinary Frequency: Present(nocturia x3-4)  Reproductive     Pain              Currently in Pain: No/denies  Accompanied by  Accompanied by: Family Member(lwife)    PHYSICAL EXAMINATION:     /71   Pulse 76   Temp 98.3  F (36.8  C) (Oral)   Wt 177 lb 12.8 oz (80.6 kg)   SpO2 97%   BMI 25.15 kg/m      Gen: Alert, in NAD  Eyes: PERRL, EOMI, sclera anicteric  Abdominal: BS+, soft, nontender, nondistended, no hepatomegaly  Back: No step-offs or pain to palpation along the thoracolumbar spine  Rectal: Deferred  : Deferred  Musculoskeletal: Normal muscle bulk and tone  Skin: Normal color and turgor  Neurologic: A/Ox3, CN II-XII intact, normal gait and station  Psychiatric: Appropriate mood and affect     Lab Results   Component Value Date    PSA <0.1 06/03/2019    PSA 10.2 (H) 08/21/2018    PSA 8.4 (H) 08/10/2017    PSA 6.7 (H) 09/28/2016    PSA 4.1 05/14/2015    PSA 4.7 09/23/2014    PSA 3.8 07/23/2014      Impression     The patient is a 78-year-old gentleman with a diagnosis of unfavorable intermediate risk prostate cancer status post short-term hormone therapy and a definitive external beam radiation therapy a month ago.  Patient has been recovering well with no clinical evidence of cancer recurrence.    Assessment & Plan:     1.  Continue pelvic floor exercises as recommended from physical therapy to improve his bladder function.  Patient is scheduled to return to radiation oncology in 6 months for another office follow-up and PSA.  2.  Continue follow-up with Dr. Alejandro, urology and Henri PCP as planned.    Face to face time  25 minutes with > 75% spent on consultation, education and coordination of care.      Lorenza Merino MD, PhD  Department of Radiation Oncology   [facility]  Tel: [phone]  Page: [phone]    [facility]  [address] [place] [address]     [facility]   [address] [facility] [place] [address]    CC:  Patient Care Team:  Nika Granados, ROSEMARY as Oncology Nurse Navigator  Lorenza Merino MD as Physician (Radiation Oncology)  Itz Alejandro MD as Physician (Urology)

## 2021-05-29 NOTE — TELEPHONE ENCOUNTER
I spoke with Jorge and explained to him that I had intended to meet with him on his last follow up visit with Dr. Merino but was out of the office. I explained my role at [facility] and his SCP. I went on to explain that I feel like the information is more appropriate for this time in his recovery/survivorship than to get it to him in 6 mos when his needs are less acute. I advised that with his permission, I would mail it to him certified mail and once rec'd would require 5 min of his time, depending upon questions, for review. He agreed and confirmed his address. I again, explained that it will come certified mail and once rec'd I would make a follow up call for review. I thanked him for his time and he thanked me for the call. I told him I would get the packet out this week. Pan

## 2021-05-29 NOTE — PROGRESS NOTES
Patient here ambulatory accompanied by wife for follow up s/p radiation for his prostate cancer.  Patient states he still has occasional loose stools but is watching his diet to help this.  Patient feels overall that things are improving.  PSA done earlier this week.  AUA completed.  Seen by Dr. Merino.   Plan RTC for follow up as directed by physician.

## 2021-05-30 NOTE — PROGRESS NOTES
[facility] Discharge Summary  Patient Name: Jorge Dupree  Date: 6/28/2019  Referral Diagnosis:urinary urgency  Referring provider: Lorenza Merino MD  Visit Diagnosis:   1. Urinary urgency     2. Pelvic floor weakness     3. Malignant neoplasm of prostate (H)         Goals:  Pt. will demonstrate/verbalize independence in self-management of condition in : 4 weeks    Pt will: no longer need to wear protective pads in 4 weeks  Pt will: only need to wake at night 2-3 times to urinate in 4 weeks      Patient was seen for 2 visits from 4/22/2019 to 5/8/2019.  The patient attended therapy initially, but did not finish the therapy sessions prescribed.  Goals were not fully achieved. Explanation for goals not achieved: patient did not complete scheduled therapy.  wished to continue with his home exercise program independently  The patient discontinued therapy, did not return.  No further therapy is required at this time.    Therapy will be discontinued at this time.  The patient will need a new referral to resume.    Thank you for your referral.  Chiquita Nance  6/28/2019  3:46 PM

## 2021-05-30 NOTE — TELEPHONE ENCOUNTER
Jorge called and we were able to review the contents of his SCP as well as the Treatment Summary in its' entirety. He had questions along the way and one error was found which I pointed out to him so he could correct on his copy and I changed the PSA result from June to 8.4 (error of [phone]). I answered his questions to the best of my ability. We talked about adv dir and he said he has one at home. He is between primary doctors right now so his adv dir isn't on file anywhere. We talked about support groups, [facility] and health promotion. He expressed understanding of all the information. He talked about fatigue and that he has to get up to urinate in the night so not sleeping well so naps during the day. I encouraged him to get up and take a short walk to revive himself rather than the nap. I pointed out the article included about treating fatigue with physical activity. I pointed out my contact information should he need anything going forward, to please call. I congratulated him on his survivorship and thanked him for his time and calling me back. Pan

## 2021-05-30 NOTE — TELEPHONE ENCOUNTER
I left a final message for Jorge to review his SCP. I encouraged him to call for review but if not, I congratulated him on his survivorship. Pan

## 2021-05-30 NOTE — TELEPHONE ENCOUNTER
Jorge called but he was unable to locate the treatment summary and wanted to take another day to look for it. He plans to call me back tomorrow. I agreed. Pan

## 2021-05-31 VITALS — BODY MASS INDEX: 24.21 KG/M2 | WEIGHT: 172.9 LBS | HEIGHT: 71 IN

## 2021-06-01 VITALS — BODY MASS INDEX: 24.6 KG/M2 | HEIGHT: 71 IN | WEIGHT: 175.7 LBS

## 2021-06-01 NOTE — PROGRESS NOTES
"ASSESSMENT:  1. Bilateral impacted cerumen  Subjective sensation of fullness of both ears, I believe this is likely secondary to impacted cerumen bilaterally.    2. Acute pain of right knee  Patient with a 1 month history of right knee pain, suspect osteoarthritis.     3. Hypertension  Well-controlled.  - amLODIPine (NORVASC) 5 MG tablet; Take 1 tablet (5 mg total) by mouth daily.  Dispense: 90 tablet; Refill: 0    4. BPH (benign prostatic hyperplasia)  Well-controlled.  - finasteride (PROSCAR) 5 mg tablet; Take 1 tablet (5 mg total) by mouth daily.  Dispense: 90 tablet; Refill: 0         PLAN:  1.  Cerumen was irrigated per Assistant staff using the usual irrigation method.  2.  In terms of the right knee, reassurance, told the patient he can take over-the-counter Tylenol.  3.  I did renew the patient's amlodipine and Proscar.  4.  Patient also has some varicosities in the right ankle, for now watchful waiting discussed possible vascular referral.  5.  The patient will be seen in the next several months for either a wellness visit or an establish care.    No orders of the defined types were placed in this encounter.    Medications Discontinued During This Encounter   Medication Reason     docusate sodium (COLACE) 100 MG capsule Therapy completed     leuprolide, 6 month, 45 mg syringe Therapy completed     finasteride (PROSCAR) 5 mg tablet Reorder     amLODIPine (NORVASC) 5 MG tablet Reorder       Return in about 3 months (around 1/1/2020) for to establish care with someone .    CHIEF COMPLAINT:  Chief Complaint   Patient presents with     Ear Fullness     not sure if ears need to be cleaned. Feel plugged for a \"few weeks\"      Knee Pain     back of R knee painful and swollen. No injury known        SUBJECTIVE:  Jorge is a 78 y.o. male presenting to the clinic for multiple complaints.    Right Knee: He says that the lateral side of his knee aches and that it sometimes spreads to his right hip. Takes tylenol as " needed. Says that when he walks sometimes he feels unstable. Not tender. He has no limitation in his range of motion.     Ear Fullness: His right ear has been feeling full for the past few weeks. He says that hearing out of that ear is not impaired. When he lays down on it, he says he can hear his heartbeat.      Health Maintenance: He already got his flu shot.     REVIEW OF SYSTEMS:   The varicose veins on his right foot aches sometimes. All other systems are negative.    PFSH:  Immunization History   Administered Date(s) Administered     Pneumo Conj 13-V (2010&after) 05/14/2015     Pneumo Conj 7-V(before 2010) 07/30/2010     Pneumo Polysac 23-V 01/21/2016     Td, adult adsorbed, PF 09/28/2016     Td,adult,historic,unspecified 02/22/2005     ZOSTER, LIVE 08/13/2012     Social History     Socioeconomic History     Marital status:      Spouse name: Not on file     Number of children: 2     Years of education: Not on file     Highest education level: Not on file   Occupational History     Occupation: Retired     Employer: [employer]   Social Needs     Financial resource strain: Not on file     Food insecurity:     Worry: Not on file     Inability: Not on file     Transportation needs:     Medical: Not on file     Non-medical: Not on file   Tobacco Use     Smoking status: Never Smoker     Smokeless tobacco: Never Used   Substance and Sexual Activity     Alcohol use: No     Alcohol/week: 0.0 - 1.0 standard drinks     Drug use: No     Sexual activity: Not on file   Lifestyle     Physical activity:     Days per week: Not on file     Minutes per session: Not on file     Stress: Not on file   Relationships     Social connections:     Talks on phone: Not on file     Gets together: Not on file     Attends Yazidi service: Not on file     Active member of club or organization: Not on file     Attends meetings of clubs or organizations: Not on file     Relationship status: Not on file     Intimate partner  violence:     Fear of current or ex partner: Not on file     Emotionally abused: Not on file     Physically abused: Not on file     Forced sexual activity: Not on file   Other Topics Concern     Not on file   Social History Narrative     Not on file     Past Medical History:   Diagnosis Date     Hypertension      Medical history non-contributory      Prostate cancer (H)      Family History   Problem Relation Age of Onset     Cancer Mother      Cancer Maternal Aunt        MEDICATIONS:  Current Outpatient Medications   Medication Sig Dispense Refill     amLODIPine (NORVASC) 5 MG tablet Take 1 tablet (5 mg total) by mouth daily. 90 tablet 0     finasteride (PROSCAR) 5 mg tablet Take 1 tablet (5 mg total) by mouth daily. 90 tablet 0     oxybutynin (DITROPAN XL) 5 MG ER tablet Take 1 tablet (5 mg total) by mouth daily. 90 tablet 2     tamsulosin (FLOMAX) 0.4 mg cap TAKE 2 CAPSULES(0.8 MG) BY MOUTH DAILY AFTER BREAKFAST 180 capsule 3     No current facility-administered medications for this visit.        TOBACCO USE:  Social History     Tobacco Use   Smoking Status Never Smoker   Smokeless Tobacco Never Used       VITALS:  Vitals:    10/01/19 1336   BP: 113/61   Pulse: 78   SpO2: 98%   Weight: 178 lb 6.4 oz (80.9 kg)     Wt Readings from Last 3 Encounters:   10/01/19 178 lb 6.4 oz (80.9 kg)   06/05/19 177 lb 12.8 oz (80.6 kg)   04/30/19 178 lb 1.6 oz (80.8 kg)       PHYSICAL EXAM:  Constitutional:   Reveals an alert and pleasant male.  Vitals: per nursing notes.  HEENT:  Ears:  Bilateral impacted cerumen.    Eyes:  EOMs full, PERRL.  Lungs: Clear to A&P without rales or wheezes.  Respiratory effort normal.  Cardiac:   Regular rate and rhythm, normal S1, S2, no murmur or gallop.  Musculoskeletal: Right knee not swollen with mild diffuse tenderness.  Extremities: Right lower leg varicosity on right inner ankle.  Neuro:  Alert and oriented. Cranial nerves, motor, sensory exams are intact.  No gross focal  deficits.  Psychiatric:  Memory intact, mood appropriate.      DATA REVIEWED:  Additional History from Old Records Summarized (2): None  Decision to Obtain Records (1): None  Radiology Tests Summarized or Ordered (1): None  Labs Reviewed or Ordered (1): None  Medicine Test Summarized or Ordered (1): None  Independent Review of EKG, X-RAY, or RAPID STREP (2 each): None    The visit lasted a total of 10 minutes face to face with the patient. Over 50% of the time was spent counseling and educating the patient about right knee pain..    IPreeti, am scribing for and in the presence of, Dr. Alvarez.    I, Dr. Alvarez, personally performed the services described in this documentation, as scribed by Preeti Martinez in my presence, and it is both accurate and complete.    Total Data Points: 0

## 2021-06-02 ENCOUNTER — OFFICE VISIT - HEALTHEAST (OUTPATIENT)
Dept: INTERNAL MEDICINE | Facility: CLINIC | Age: 81
End: 2021-06-02

## 2021-06-02 VITALS — WEIGHT: 185.6 LBS | BODY MASS INDEX: 26.25 KG/M2

## 2021-06-02 VITALS — WEIGHT: 189.7 LBS | BODY MASS INDEX: 26.83 KG/M2

## 2021-06-02 VITALS — WEIGHT: 180.8 LBS | BODY MASS INDEX: 25.58 KG/M2

## 2021-06-02 VITALS — WEIGHT: 180.1 LBS | BODY MASS INDEX: 25.48 KG/M2

## 2021-06-02 VITALS — WEIGHT: 175 LBS | BODY MASS INDEX: 24.5 KG/M2 | HEIGHT: 71 IN

## 2021-06-02 VITALS — WEIGHT: 180.7 LBS | BODY MASS INDEX: 25.56 KG/M2

## 2021-06-02 VITALS — WEIGHT: 179.6 LBS | BODY MASS INDEX: 25.41 KG/M2

## 2021-06-02 VITALS — BODY MASS INDEX: 25.63 KG/M2 | WEIGHT: 181.2 LBS

## 2021-06-02 VITALS — WEIGHT: 189.3 LBS | BODY MASS INDEX: 26.78 KG/M2

## 2021-06-02 VITALS — BODY MASS INDEX: 25.86 KG/M2 | WEIGHT: 182.8 LBS

## 2021-06-02 VITALS — WEIGHT: 181.7 LBS | BODY MASS INDEX: 25.7 KG/M2

## 2021-06-02 DIAGNOSIS — R53.82 CHRONIC FATIGUE: ICD-10-CM

## 2021-06-02 DIAGNOSIS — K64.4 EXTERNAL HEMORRHOIDS: ICD-10-CM

## 2021-06-02 DIAGNOSIS — I10 ESSENTIAL HYPERTENSION: ICD-10-CM

## 2021-06-02 DIAGNOSIS — R39.15 URINARY URGENCY: ICD-10-CM

## 2021-06-02 DIAGNOSIS — Z85.46 PERSONAL HISTORY OF PROSTATE CANCER: ICD-10-CM

## 2021-06-02 LAB
ALBUMIN SERPL-MCNC: 3.9 G/DL (ref 3.5–5)
ALP SERPL-CCNC: 131 U/L (ref 45–120)
ALT SERPL W P-5'-P-CCNC: 12 U/L (ref 0–45)
ANION GAP SERPL CALCULATED.3IONS-SCNC: 8 MMOL/L (ref 5–18)
AST SERPL W P-5'-P-CCNC: 15 U/L (ref 0–40)
BILIRUB SERPL-MCNC: 0.7 MG/DL (ref 0–1)
BUN SERPL-MCNC: 15 MG/DL (ref 8–28)
CALCIUM SERPL-MCNC: 8.6 MG/DL (ref 8.5–10.5)
CHLORIDE BLD-SCNC: 105 MMOL/L (ref 98–107)
CO2 SERPL-SCNC: 28 MMOL/L (ref 22–31)
CREAT SERPL-MCNC: 0.74 MG/DL (ref 0.7–1.3)
ERYTHROCYTE [DISTWIDTH] IN BLOOD BY AUTOMATED COUNT: 13.6 % (ref 11–14.5)
GFR SERPL CREATININE-BSD FRML MDRD: >60 ML/MIN/1.73M2
GLUCOSE BLD-MCNC: 96 MG/DL (ref 70–125)
HCT VFR BLD AUTO: 39.5 % (ref 40–54)
HGB BLD-MCNC: 12.8 G/DL (ref 14–18)
MCH RBC QN AUTO: 28.6 PG (ref 27–34)
MCHC RBC AUTO-ENTMCNC: 32.4 G/DL (ref 32–36)
MCV RBC AUTO: 88 FL (ref 80–100)
PLATELET # BLD AUTO: 227 THOU/UL (ref 140–440)
PMV BLD AUTO: 8.7 FL (ref 7–10)
POTASSIUM BLD-SCNC: 4.2 MMOL/L (ref 3.5–5)
PROT SERPL-MCNC: 6.4 G/DL (ref 6–8)
RBC # BLD AUTO: 4.48 MILL/UL (ref 4.4–6.2)
SODIUM SERPL-SCNC: 141 MMOL/L (ref 136–145)
TSH SERPL DL<=0.005 MIU/L-ACNC: 0.73 UIU/ML (ref 0.3–5)
WBC: 4.4 THOU/UL (ref 4–11)

## 2021-06-02 NOTE — TELEPHONE ENCOUNTER
Just to clarify, I did refax that at his original dose 5 mg tablets, half a tablet or 2.5 mg daily, not changing the dose at this time.

## 2021-06-02 NOTE — TELEPHONE ENCOUNTER
Medication Question or Clarification  Who is calling: Pharmacy: [facility]  What medication are you calling about? (include dose and sig)   amLODIPine (NORVASC) 5 MG tablet 90 tablet 0 10/1/2019  No   Sig - Route: Take 1 tablet (5 mg total) by mouth daily. - Oral   Sent to pharmacy as: amLODIPine 5 mg tablet (NORVASC)       Who prescribed the medication?: Provider, No Primary Care   What is your question/concern?: Fax received from [facility] stating the patient was not expecting a dose increase. Please send new prescription for 5 mg, 1/2 tablet daily or notify patient of dose change.   Pharmacy: [facility] [id number]  Okay to leave a detailed message?: Yes  Site CMT - Please call the pharmacy to obtain any additional needed information.

## 2021-06-03 VITALS — WEIGHT: 176.6 LBS | BODY MASS INDEX: 24.98 KG/M2

## 2021-06-03 VITALS — BODY MASS INDEX: 24.9 KG/M2 | WEIGHT: 176 LBS

## 2021-06-03 VITALS — WEIGHT: 178.1 LBS | BODY MASS INDEX: 25.19 KG/M2

## 2021-06-03 VITALS — BODY MASS INDEX: 25.15 KG/M2 | WEIGHT: 177.8 LBS

## 2021-06-03 VITALS
DIASTOLIC BLOOD PRESSURE: 61 MMHG | HEART RATE: 78 BPM | BODY MASS INDEX: 25.24 KG/M2 | OXYGEN SATURATION: 98 % | WEIGHT: 178.4 LBS | SYSTOLIC BLOOD PRESSURE: 113 MMHG

## 2021-06-03 NOTE — PROGRESS NOTES
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      TYPE OF RADIATION THERAPY ADMINISTERED:  7942 cGy in 45 treatments given from 2/19/2019-5/2/2019.      INTERVAL SINCE COMPLETION OF RADIATION THERAPY: 7 months.      SUBJECTIVE:  Mr. Dupree is a 79 y.o. male who has been in his usual state of good health until recently.  The patient presented with history of abnormal elevation PSA from 4.1 in 5/2015 to 7 in 9/2016 to 8.48/2017 and to 10.2 in 8/2018.  The patient had some moderate urinary urgency over the past a few years and denies any other symptoms at the time of evaluation.  His initial rectal exam is reviewed 2+ enlarged prostate gland with left lobe firmness.  Initial MRI prostate on 9/20/2018 reviewed Pi-Rads 5 lesion in the left posterior lateral peripheral zone highly suspicious for malignancy.  The estimated prostate volume was 51 cc.  The lesion makes broad capsular contact with the blurring at the neurovascular bundle origin.  No evidence of ivania and skeletal metastasis.  He underwent transrectal ultrasound study and biopsy on 11/1/2018.  The pathology revealed the prostatic adenocarcinoma, [place] grade 4+3 = 7 and 3+4 = 7 involving up to 90% biopsy tissue in 15/15 cores bilaterally.  He underwent staging workup including CT abdomen pelvics and the bone scan which showed no radiographic evidence of ivania and systemic metastasis. Patient received short-term hormone therapy and definitive external beam radiation therapy for his prostate cancer and had radiation therapy in our clinic with a total dose of 7942 cGy in 45 treatments given from 2/19/2019-5/2/2019.  The patient did have significant dysuria before and during the radiation therapy  for which he required penile clamp to maintain his bladder full during the therapy.     The patient has been gradually recovering well since completion of the radiation therapy.  He is currently is followed with physical therapy with significant bladder control improvement.  He is bladder function continue to improve since last visit.   He is AUA score is 12/35.  He otherwise denies any bowel irritations and rectal bleeding at the time of evaluation. He is here for routine postradiation therapy office follow-up.     Medications were reviewed and are up to date on EPIC.    The following portions of the patient's history were reviewed and updated as appropriate: allergies, current medications, past family history, past medical history, past social history, past surgical history and problem list.    Review of Systems:      General  Constitutional (WDL): Exceptions to WDL  Fatigue: Fatigue relieved by rest  EENT  Eye Disorder (WDL): All eye disorder elements are within defined limits(Wears glasses)  Ear Disorder (WDL): All ear disorder elements are within defined limits  Respiratory   Respiratory (WDL): Within Defined Limits  Cardiovascular  Cardiovascular (WDL): All cardiovascular elements are within defined limits  Endocrine     Gastrointestinal  Gastrointestinal (WDL): All gastrointestinal elements are within defined limits  Musculoskeletal  Musculoskeletal and Connetive Tissue Disorders (WDL): Exceptions to WDL  Arthralgia: None  Bone Pain: Mild pain  Integumentary               Integumentary (WDL): All integumentary elements are within defined limits  Neurological  Neurosensory (WDL): All neurosensory elements are within defined limits  Psychological/Emotional   Patient Coping: Accepting  Hematological/Lymphatic  Lymph (WDL): All lymph disorder elements are within defined limits  Dermatologic     Genitourinary/Reproductive  Genitourinary (WDL): Exceptions to WDL  Urinary Frequency: Present  Reproductive     Pain               Currently in Pain: No/denies  Accompanied by  Accompanied by: Family Member(Wife)    Objective:     PHYSICAL EXAMINATION:    /73   Pulse 74   Temp 98  F (36.7  C) (Oral)   Wt 176 lb 9.6 oz (80.1 kg)   SpO2 97%   BMI 24.98 kg/m      Gen: Alert, in NAD  Eyes: PERRL, EOMI, sclera anicteric  Back: No step-offs or pain to palpation along the thoracolumbar spine  Rectal: Deferred  : Deferred  Musculoskeletal: Normal muscle bulk and tone  Skin: Normal color and turgor  Neurologic: A/Ox3, CN II-XII intact, normal gait and station  Psychiatric: Appropriate mood and affect     Lab Results   Component Value Date    PSA <0.1 12/02/2019    PSA <0.1 06/03/2019    PSA 10.2 (H) 08/21/2018    PSA 8.4 (H) 08/10/2017    PSA 6.7 (H) 09/28/2016    PSA 4.1 05/14/2015    PSA 4.7 09/23/2014    PSA 3.8 07/23/2014      Impression     The patient is a 79-year-old gentleman with a diagnosis of unfavorable intermediate risk prostate cancer status post short-term hormone therapy and a definitive external beam radiation therapy 7 months ago.  Patient has been recovering well with no clinical evidence of cancer recurrence.    Assessment & Plan:     1.  Continue pelvic floor exercises as recommended from physical therapy to improve his bladder function.  Patient is scheduled to return to radiation oncology in 6 months for another office follow-up and PSA.  2.  Continue follow-up with Dr. Alejandro, urology and Henri PCP as planned.     Face to face time  25 minutes with > 75% spent on consultation, education and coordination of care.      Lorenza Merino MD, PhD  Department of Radiation Oncology   [facility]  Tel: [phone]  Page: [phone]    [facility]  [address] [place] [address]     [address] [place] [address]    CC:  Patient Care Team:  Provider, No Primary Care as PCP - Nika Jones, RN as Oncology Nurse Navigator  Lorenza Merino MD as Physician  (Radiation Oncology)  Itz Alejandro MD as Physician (Urology)  Rajeev Galindo MD as Assigned PCP  Hood Alvarez MD as Assigned PCP

## 2021-06-03 NOTE — TELEPHONE ENCOUNTER
Refill Approved    Rx renewed per Medication Renewal Policy. Medication was last renewed on 10/7/19 .    Carin Martinez, [facility] Triage/Med Refill 11/27/2019     Requested Prescriptions   Pending Prescriptions Disp Refills     amLODIPine (NORVASC) 2.5 MG tablet [Pharmacy Med Name: AMLODIPINE BESYLATE 2.5 MG TAB] 90 tablet 2     Sig: TAKE 1 TABLET BY MOUTH ONCE DAILY       Calcium-Channel Blockers Protocol Passed - 11/26/2019 10:34 AM        Passed - PCP or prescribing provider visit in past 12 months or next 3 months     Last office visit with prescriber/PCP: 2/15/2016 Antwan Álvarez MD OR same dept: 3/14/2019 Rajeev Galindo MD OR same specialty: 3/14/2019 Rajeev Galindo MD  Last physical: 9/28/2016 Last MTM visit: Visit date not found   Next visit within 3 mo: Visit date not found  Next physical within 3 mo: Visit date not found  Prescriber OR PCP: Antwan Álvarez MD  Last diagnosis associated with med order: There are no diagnoses linked to this encounter.  If protocol passes may refill for 12 months if within 3 months of last provider visit (or a total of 15 months).             Passed - Blood pressure filed in past 12 months     BP Readings from Last 1 Encounters:   10/01/19 113/61

## 2021-06-04 VITALS
OXYGEN SATURATION: 97 % | WEIGHT: 173.8 LBS | HEIGHT: 70 IN | HEART RATE: 73 BPM | DIASTOLIC BLOOD PRESSURE: 75 MMHG | BODY MASS INDEX: 24.88 KG/M2 | SYSTOLIC BLOOD PRESSURE: 152 MMHG | TEMPERATURE: 97.6 F

## 2021-06-04 VITALS
TEMPERATURE: 98.2 F | SYSTOLIC BLOOD PRESSURE: 116 MMHG | HEART RATE: 66 BPM | OXYGEN SATURATION: 96 % | DIASTOLIC BLOOD PRESSURE: 58 MMHG | WEIGHT: 175.7 LBS | BODY MASS INDEX: 25.21 KG/M2

## 2021-06-04 VITALS
TEMPERATURE: 98 F | BODY MASS INDEX: 24.98 KG/M2 | DIASTOLIC BLOOD PRESSURE: 73 MMHG | SYSTOLIC BLOOD PRESSURE: 139 MMHG | WEIGHT: 176.6 LBS | HEART RATE: 74 BPM | OXYGEN SATURATION: 97 %

## 2021-06-05 VITALS
WEIGHT: 171 LBS | DIASTOLIC BLOOD PRESSURE: 60 MMHG | HEART RATE: 66 BPM | OXYGEN SATURATION: 98 % | BODY MASS INDEX: 24.54 KG/M2 | SYSTOLIC BLOOD PRESSURE: 120 MMHG

## 2021-06-05 VITALS
SYSTOLIC BLOOD PRESSURE: 134 MMHG | DIASTOLIC BLOOD PRESSURE: 78 MMHG | WEIGHT: 169 LBS | HEART RATE: 74 BPM | OXYGEN SATURATION: 99 % | BODY MASS INDEX: 24.25 KG/M2

## 2021-06-05 VITALS
SYSTOLIC BLOOD PRESSURE: 120 MMHG | WEIGHT: 170.6 LBS | OXYGEN SATURATION: 98 % | TEMPERATURE: 98 F | DIASTOLIC BLOOD PRESSURE: 58 MMHG | BODY MASS INDEX: 24.48 KG/M2 | HEART RATE: 73 BPM

## 2021-06-06 NOTE — PATIENT INSTRUCTIONS - HE
Establishing primary care for this 79-year-old man, retired ground crew technician for [employer], comes to clinic accompanied by his wife.  Issues are external hemorrhoids, most likely the source of the blood that he is seen in the toilet bowl, with history of small adenomatous colon polyps removed January 2015.  Bladder urgency and frequency in the context of history of prostate cancer and pelvic radiation therapy that completed in April 2019.  Essential hypertension, probably reasonably well controlled, although I want to increase his amlodipine up to 5 mg a day.  Right hip and buttock pain, which I believe is on the basis of gluteal muscles and tendons, also has muscular deconditioning of his quadriceps, I will be referring him to physical therapy for strength and stabilization training.  Earwax.  Actinic keratoses, working with his dermatologist.  Current on vaccinations, already received seasonal flu shot.    Today, I would send him for comprehensive metabolic panel, blood cell counts, and also urinalysis, culture if indicated.  PSA was checked in December 2019, was undetectable.  No need to check it today.    Will place referral to physical therapy.    Going issue by issue:    External hemorrhoids, most likely the source of the blood that he is seen in the toilet bowl, with history of small adenomatous colon polyps removed January 2015.  On examination today, he has an approximately 3 to 4 mm external hemorrhoid, with some clot inside.  I did not detect significant internal hemorrhoids.  He did have a colonoscopy performed January 2015 which disclosed 2 small adenomatous polyps that were removed.  These were less than 4 mm.  Also noted was long redundant colon.  Diverticulosis not noted.    I am in a check his blood cell counts today, to make sure that he is not anemic.  I do believe that external hemorrhoids is a satisfactory explanation for the blood that he is observed.    He told me that he has  been constipated in the past, and will use intermittent docusate as a stool softener.    I told him about the importance of keeping the stool soft, in order to avoid traumatizing the hemorrhoids and triggering bleeding.  I suggested using MiraLAX, 1 scoop taken even every day, which will increase the water content of the stools.  Also important is a high-fiber diet, drinking adequate liquids, and getting adequate exercise and healthy sleep.  I do not think that he needs to pursue any more colonoscopy, as long as symptoms do not progress.    Bladder urgency and frequency in the context of history of prostate cancer and pelvic radiation therapy that completed in April 2019.  He saw radiation oncology in December 2019.  He was told to continue with the pelvic floor exercises.  He uses an absorptive undergarment to prevent accidents.  He has been on a small dose of Ditropan XL 5 mg a day, and thought that it did help.  He found the medication disturbingly expensive.    We decided to try the immediate release oxybutynin, 5 mg twice a day.  He also has been on tamsulosin for years.  He told me that the bladder flow is good.  I told him to try an experiment of stopping the tamsulosin, to see if perhaps it helps with the urgency symptoms.  I told him that with the oxybutynin and the tamsulosin, the bottom line is his levels of symptoms.    I also reminded him that he can modify his habits to try to help with bladder function.  That includes reducing fluid consumption in the late evening so he does not need to get up as much.  I also told him that caffeine is a bladder stimulant, so best to go caffeine free.  Certain foods, particularly acidic foods like tomatoes, can be irritating to the bladder.    I told him he can finish off his existing Ditropan XL tablets by taking 2 of them simultaneously.  Then when he switches over to the immediate release oxybutynin, take 5 mg twice a day, morning and evening.    Essential  hypertension, probably reasonably well controlled, although I want to increase his amlodipine up to 5 mg a day.  Today's in clinic blood pressure of 152/75 is a little high on systolic side.  I told him that the goal is a resting seated systolic blood pressure of less than 130, diastolic around 80.  I did suggest that he purchase a home blood pressure machine so we can track his own readings outside the clinic.  Please bring the machine to the clinic so that we can validate it against a manual reading, save the receipt, so in case the machine is not accurate you can return it.    I told him he can finish off his remaining amlodipine 2.5 mg tablets by taking 2 of them simultaneously.    Right hip and buttock pain, possibly gluteal tendinitis, which I believe is on the basis of gluteal muscles and tendons, also has muscular deconditioning of his quadriceps, I will be referring him to physical therapy for strength and stabilization training.  As I was maneuvering him on the exam table, he got a spasm of pain in the right buttock.  I am pretty convinced that his pain is coming from muscles, tendons, ligaments, not from the joint per se.  He had pelvis and hip x-rays done January 2016 which showed some minor findings in the right hip joint of a small accessory office acetabulum and minimal degenerative changes in the SI joints.  I would send him back to physical therapy to review pelvic girdle strengthening and stabilization as well as flexibility. Also work on building up his quadriceps muscles, which will help with gait stability and reduce stress on his knees.    Earwax.  For earwax, I asked him to purchase an over-the-counter wax dissolving eardrop kit, example brands Debrox or Murine.    Actinic keratoses, working with his dermatologist.  Current on vaccinations, already received seasonal flu shot.    I like to see him back in 3 months.

## 2021-06-06 NOTE — PROGRESS NOTES
[facility] Daily Progress     Patient Name: Jorge Dupree  Date: 3/5/2020  Visit #: 2/10  Authorization dates:  3/2/20-5/7/20  Referral Diagnosis: Gluteal tendinitis of right buttock  Referring provider: Denton Head MD  Visit Diagnosis:     ICD-10-CM    1. Gluteal tendinitis of right buttock M76.01    2. Right leg pain M79.604        Precautions / Restrictions : prostate cancer       Assessment:     Response to Intervention:  Tolerated well with decreased pain and improved gait pattern post treatment.    Symptoms are consistent with:  Medical diagnosis with sciatic nerve involvement.  Patient is appropriate to continue with skilled physical therapy intervention, as indicated by initial plan of care.    Goal Status:  Pt. will demonstrate/verbalize independence in self-management of condition in : 6 weeks  Pt. will be independent with home exercise program in : 6 weeks  Pt. will have improved quality of sleep: with less pain;waking less times/night;in 6 weeks  Pt. will be able to walk : 10 minutes;with less difficulty;with less pain;for household mobility;in 6 weeks  Patient will stand : 30 minutes;with less pain;with less difficultty;for home chores;in 6 weeks  Patient will transfer: sit/stand;supine/sit;for in/out of bed;for in/out of chair;with no pain;with less difficulty;in 6 weeks    No data recorded  Other functional progress:           Plan / Patient Education:     Continue with initial plan of care.  Progress with home program as tolerated.  Continue teasing out sciatica v stenosis    Subjective:     Pain Rating:  Resting 7  Activity:  8    Response to last treatment: felt pretty good  HEP- Frequency: 0x/day, Questions or difficulties:  Not doing exercises cause of soreness.    Patient reports:      Felt good the rest of the day, but the next day was quite painful.    Wasn't able to do exercises secondary to pain.    He feels better when walking with shopping cart.      Objective:  "      Severe limp.      Treatment Today     Exercises:  Exercise #1: manual stretching hip flexor, quad, piriformis  Comment #1: not today.  Exercise #2: supine LE nerve glide  Comment #2: 10  Exercise #3:    Comment #3:    Exercise #4: seated nerve glide/ LAQ  Comment #4: 10 bilateral  Exercise #5: LTR cues to keep pain free  Comment #5: 10  Exercise #6: seated marching  Comment #6: 10 bilateral  Exercise #7: single knee to chest, double knee to chest  Comment #7: 30\" each x 2 bilateral            TREATMENT MINUTES COMMENTS   Evaluation     Self-care/ Home management     Manual therapy     Neuromuscular Re-education     Therapeutic Activity     Therapeutic Exercises 25 See exercise flow-sheet for details.    Gait training 5 Trials and instruction for use of SEC and 2WW   Modality__________________                Total 30    Blank areas are intentional and mean the treatment did not include these items.       aZck Isaac, PT  3/5/2020     "

## 2021-06-06 NOTE — TELEPHONE ENCOUNTER
Pt calling and c/o blood in stool. Informed that it could be hemorrhoids but probably not from radiation. Informed to call primary doctor, discuss, and get labs drawn. Pt also c/o of intermittent pain with urination, pt on Flomax, informed to have primary get an urine sample. Pt agreeable to plan.

## 2021-06-06 NOTE — TELEPHONE ENCOUNTER
Refill Approved    Rx renewed per Medication Renewal Policy. Medication was last renewed on 11/30/18.    Ceci Valdez, [facility] Triage/Med Refill 2/25/2020     Requested Prescriptions   Pending Prescriptions Disp Refills     tamsulosin (FLOMAX) 0.4 mg cap 180 capsule 3       Alfuzosin/Tamsulosin/Silodosin Refill Protocol  Passed - 2/24/2020 11:33 AM        Passed - PCP or prescribing provider visit in past 12 months       Last office visit with prescriber/PCP: Visit date not found OR same dept: Visit date not found OR same specialty: Visit date not found  Last physical: Visit date not found Last MTM visit: Visit date not found   Next visit within 3 mo: Visit date not found  Next physical within 3 mo: Visit date not found  Prescriber OR PCP: Dominique Whalen MD  Last diagnosis associated with med order: 1. Benign prostatic hyperplasia  - tamsulosin (FLOMAX) 0.4 mg cap  Dispense: 180 capsule; Refill: 3    If protocol passes may refill for 12 months if within 3 months of last provider visit (or a total of 15 months).

## 2021-06-06 NOTE — PROGRESS NOTES
"[facility] Daily Progress     Patient Name: Jorge Dupree  Date: 3/17/2020  Visit #: 5/10  PTA #1  Authorization dates:  3/2/20-5/7/20  Referral Diagnosis: Gluteal tendinitis of right buttock  Referring provider: Denton Head MD  Visit Diagnosis:     ICD-10-CM    1. Gluteal tendinitis of right buttock  M76.01    2. Right leg pain  M79.604    3. Urinary urgency  R39.15    4. Pelvic floor weakness  N81.89        Precautions / Restrictions : prostate cancer       Assessment:     Response to Intervention:  Tolerated manual therapy.Tender L piriformis.    Symptoms are consistent with:  Medical diagnosis with sciatic nerve involvement.  Patient is appropriate to continue with skilled physical therapy intervention, as indicated by initial plan of care.    Goal Status:  Pt. will demonstrate/verbalize independence in self-management of condition in : 6 weeks  Pt. will be independent with home exercise program in : 6 weeks  Pt. will have improved quality of sleep: with less pain;waking less times/night;in 6 weeks  Pt. will be able to walk : 10 minutes;with less difficulty;with less pain;for household mobility;in 6 weeks  Patient will stand : 30 minutes;with less pain;with less difficultty;for home chores;in 6 weeks  Patient will transfer: sit/stand;supine/sit;for in/out of bed;for in/out of chair;with no pain;with less difficulty;in 6 weeks    No data recorded  Other functional progress:  Patient with decreased pain today.     Plan / Patient Education:     Continue with initial plan of care.  Progress with home program as tolerated.  Continue teasing out sciatica v stenosis  Plan for next time: Progress with increased manual therapy of left lumbar muscles and piriformis.     Subjective:     Pt reports he has been doing his HEP.  Pt reports with walking his hip is somewhat better. \"With normal walking it aches.\"  Pt reports he wakes when turning in bed due to pain.  Pt reports his hip will ache when standing. " "    Pain Ratin/10    Response to last treatment: felt pretty good  HEP- Frequency: 2x/day, Questions or difficulties: none.      Objective:     Continues to Walk with moderate forward trunk lean, trunk and hips in slight flexion  Soft tissue tightness over the left piriformis region.       Treatment Today     Exercises:  Exercise #1: NuStep 3 mins at resistance level 5  Comment #1: with discussion of current symptoms, progress, and HEP  Exercise #2: supine LE nerve glide- not today  Comment #2: 10  Exercise #3:    Comment #3:    Exercise #4: seated nerve glide/ LAQ  Comment #4: 10 bilateral  Exercise #5: LTR cues to keep pain free  Comment #5: 10  Exercise #6: seated marching  Comment #6: 10 bilateral  Exercise #7: single knee to chest  Comment #7: 30\" each x 2 bilateral  Exercise #8: double knee to chest  Comment #8: not today.  Exercise #9: sit to stand without UE from table  Comment #9: 10  Exercise #10: standing hip abduction  Comment #10: 10 reps bilateral     MFR layers 1-3 right glut max, glut med, piriformis     TREATMENT MINUTES COMMENTS   Evaluation     Self-care/ Home management     Manual therapy 15 See above   Neuromuscular Re-education     Therapeutic Activity     Therapeutic Exercises 15 See exercise flow-sheet for details.    Gait training     Modality__________________                Total 30    Blank areas are intentional and mean the treatment did not include these items.       Aileen Vale, PTA,CLT  3/17/2020     "

## 2021-06-06 NOTE — PROGRESS NOTES
Office Visit - New Patient   Jorge Dupree   79 y.o.  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  Issues are external hemorrhoids, most likely the source of the blood that he is seen in the toilet bowl, with history of small adenomatous colon polyps removed January 2015.  Bladder urgency and frequency in the context of history of prostate cancer and pelvic radiation therapy that completed in April 2019.  Essential hypertension, probably reasonably well controlled, although I want to increase his amlodipine up to 5 mg a day.  Right hip and buttock pain, which I believe is on the basis of gluteal muscles and tendons, also has muscular deconditioning of his quadriceps, I will be referring him to physical therapy for strength and stabilization training.  Earwax.  Actinic keratoses, working with his dermatologist.  Current on vaccinations, already received seasonal flu shot.    Today, I would send him for comprehensive metabolic panel, blood cell counts, and also urinalysis, culture if indicated.  PSA was checked in December 2019, was undetectable.  No need to check it today.    Will place referral to physical therapy.    Going issue by issue:    External hemorrhoids, most likely the source of the blood that he is seen in the toilet bowl, with history of small adenomatous colon polyps removed January 2015.  On examination today, he has an approximately 3 to 4 mm external hemorrhoid, with some clot inside.  I did not detect significant internal hemorrhoids.  He did have a colonoscopy performed January 2015 which disclosed 2 small adenomatous polyps that were removed.  These were less than 4 mm.  Also noted was long redundant colon.  Diverticulosis not noted.    I am in a check his blood cell counts today, to make sure that he  is not anemic.  I do believe that external hemorrhoids is a satisfactory explanation for the blood that he is observed.    He told me that he has been constipated in the past, and will use intermittent docusate as a stool softener.    I told him about the importance of keeping the stool soft, in order to avoid traumatizing the hemorrhoids and triggering bleeding.  I suggested using MiraLAX, 1 scoop taken even every day, which will increase the water content of the stools.  Also important is a high-fiber diet, drinking adequate liquids, and getting adequate exercise and healthy sleep.  I do not think that he needs to pursue any more colonoscopy, as long as symptoms do not progress.    Bladder urgency and frequency in the context of history of prostate cancer and pelvic radiation therapy that completed in April 2019.  He saw radiation oncology in December 2019.  He was told to continue with the pelvic floor exercises.  He uses an absorptive undergarment to prevent accidents.  He has been on a small dose of Ditropan XL 5 mg a day, and thought that it did help.  He found the medication disturbingly expensive.    We decided to try the immediate release oxybutynin, 5 mg twice a day.  He also has been on tamsulosin for years.  He told me that the bladder flow is good.  I told him to try an experiment of stopping the tamsulosin, to see if perhaps it helps with the urgency symptoms.  I told him that with the oxybutynin and the tamsulosin, the bottom line is his levels of symptoms.    I also reminded him that he can modify his habits to try to help with bladder function.  That includes reducing fluid consumption in the late evening so he does not need to get up as much.  I also told him that caffeine is a bladder stimulant, so best to go caffeine free.  Certain foods, particularly acidic foods like tomatoes, can be irritating to the bladder.    I told him he can finish off his existing Ditropan XL tablets by taking 2 of them  simultaneously.  Then when he switches over to the immediate release oxybutynin, take 5 mg twice a day, morning and evening.    Essential hypertension, probably reasonably well controlled, although I want to increase his amlodipine up to 5 mg a day.  Today's in clinic blood pressure of 152/75 is a little high on systolic side.  I told him that the goal is a resting seated systolic blood pressure of less than 130, diastolic around 80.  I did suggest that he purchase a home blood pressure machine so we can track his own readings outside the clinic.  Please bring the machine to the clinic so that we can validate it against a manual reading, save the receipt, so in case the machine is not accurate you can return it.    I told him he can finish off his remaining amlodipine 2.5 mg tablets by taking 2 of them simultaneously.    Right hip and buttock pain, possibly gluteal tendinitis, which I believe is on the basis of gluteal muscles and tendons, also has muscular deconditioning of his quadriceps, I will be referring him to physical therapy for strength and stabilization training.  As I was maneuvering him on the exam table, he got a spasm of pain in the right buttock.  I am pretty convinced that his pain is coming from muscles, tendons, ligaments, not from the joint per se.  He had pelvis and hip x-rays done January 2016 which showed some minor findings in the right hip joint of a small accessory office acetabulum and minimal degenerative changes in the SI joints.  I would send him back to physical therapy to review pelvic girdle strengthening and stabilization as well as flexibility. Also work on building up his quadriceps muscles, which will help with gait stability and reduce stress on his knees.    Earwax.  For earwax, I asked him to purchase an over-the-counter wax dissolving eardrop kit, example brands Debrox or Murine.    Actinic keratoses, working with his dermatologist.  Current on vaccinations, already received  seasonal flu shot.    I like to see him back in 3 months.       Chief Complaint   Chief Complaint   Patient presents with     Establish Care     Concerns regarding hemorrhoids, urine urgency        History of Present Illness   This 79 y.o. 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  Issues are external hemorrhoids, most likely the source of the blood that he is seen in the toilet bowl, with history of small adenomatous colon polyps removed January 2015.  Bladder urgency and frequency in the context of history of prostate cancer and pelvic radiation therapy that completed in April 2019.  Essential hypertension, probably reasonably well controlled, although I want to increase his amlodipine up to 5 mg a day.  Right hip and buttock pain, which I believe is on the basis of gluteal muscles and tendons, also has muscular deconditioning of his quadriceps, I will be referring him to physical therapy for strength and stabilization training.  Earwax.  Actinic keratoses, working with his dermatologist.  Current on vaccinations, already received seasonal flu shot.    Hemorrhoids  He feels the bumps  He sometimes sees blood in toilet bowl  He has BM in morning. Formed. Normal  He takes colace intermittely, sometimes hard  He does have tendency to get constipated in past years  Colonoscopy January 22, 2015, indication was occult blood in the stool, notable for long redundant colon, hemorrhoids, 4 mm polyp removed from the cecum, 4 mm polyp removed from the sigmoid.  Both of these were completely removed.  Histology was tubular adenoma for both.    Bladder Urgency. Frequency   Radiation finished April 2019  Gets up 3-4 times a night  Oxybutynin (DITROPAN XL) 5 MG ER tablet-- added spring 2019. Costly.  He asked about cheaper option.  Wears as undergarment  tamsulosin (FLOMAX) 0.4 mg cap  He says flow is good  Doing pelvic  "floor exercises  Lab Results   Component Value Date    PSA <0.1 12/02/2019    PSA <0.1 06/03/2019    PSA 10.2 (H) 08/21/2018     Hypetension  Meds review  On low dose amlodipine    Right hip  Did PT in 2017  XR PELVIS W 2 VW HIPS BILATERAL1/21/2016 12:03 PMINDICATION: Lesion of sciatic nerve, right lower limb.COMPARISON: None.FINDINGS: Small accessory os acetabulum right hip. Bony prominence to the femoral head neck junctions bilaterally. Right hip otherwise   negative. Minimal marginal spurring left femoral head. Mild degenerative changes in the SI joints and lumbar spine.This report was electronically interpreted by: Dr. Pantera Loya MD ON 01/21/2016 at 13:59  Comes and goes aching  Mobility good  No falling    Ears    Rad onc 12-4-19  DISEASE TREATED: Unfavorable intermediate risk prostate cancer, clinical stage T2 cN0 M0, Shemar grade 4+3=7 and 3+4 =7 involving up to 90% of biopsy tissue bilaterally, and pretherapy PSA of 10.2.  TYPE OF RADIATION THERAPY ADMINISTERED:  7942 cGy in 45 treatments given from 2/19/2019-5/2/2019.   INTERVAL SINCE COMPLETION OF RADIATION THERAPY: 7 months.  \"Mr. Dupree is a 79 y.o. male who has been in his usual state of good health until recently.  The patient presented with history of abnormal elevation PSA from 4.1 in 5/2015 to 7 in 9/2016 to 8.48/2017 and to 10.2 in 8/2018.  The patient had some moderate urinary urgency over the past a few years and denies any other symptoms at the time of evaluation.  His initial rectal exam is reviewed 2+ enlarged prostate gland with left lobe firmness.  Initial MRI prostate on 9/20/2018 reviewed Pi-Rads 5 lesion in the left posterior lateral peripheral zone highly suspicious for malignancy.  The estimated prostate volume was 51 cc.  The lesion makes broad capsular contact with the blurring at the neurovascular bundle origin.  No evidence of ivania and skeletal metastasis.  He underwent transrectal ultrasound study and biopsy on 11/1/2018.  The " "pathology revealed the prostatic adenocarcinoma, Woodbury grade 4+3 = 7 and 3+4 = 7 involving up to 90% biopsy tissue in 15/15 cores bilaterally.  He underwent staging workup including CT abdomen pelvics and the bone scan which showed no radiographic evidence of ivania and systemic metastasis. Patient received short-term hormone therapy and definitive external beam radiation therapy for his prostate cancer and had radiation therapy in our clinic with a total dose of 7942 cGy in 45 treatments given from 2/19/2019-5/2/2019.  The patient did have significant dysuria before and during the radiation therapy for which he required penile clamp to maintain his bladder full during the therapy.\"  \"The patient has been gradually recovering well since completion of the radiation therapy.  He is currently is followed with physical therapy with significant bladder control improvement.  He is bladder function continue to improve since last visit.   He is AUA score is 12/35.  He otherwise denies any bowel irritations and rectal bleeding at the time of evaluation. He is here for routine postradiation therapy office follow-up.\"    PSA <0.1 12/02/2019     PSA <0.1 06/03/2019    PSA 10.2 (H) 08/21/2018    PSA 8.4 (H) 08/10/2017    PSA 6.7 (H) 09/28/2016    PSA 4.1 05/14/2015    PSA 4.7 09/23/2014    PSA 3.8 07/23/2014    \"diagnosis of unfavorable intermediate risk prostate cancer status post short-term hormone therapy and a definitive external beam radiation therapy 7 months ago.  Patient has been recovering well with no clinical evidence of cancer recurrence.\"  \"1.  Continue pelvic floor exercises as recommended from physical therapy to improve his bladder function.  Patient is scheduled to return to radiation oncology in 6 months for another office follow-up and PSA.\"      Wt Readings from Last 3 Encounters:   02/25/20 173 lb 12.8 oz (78.8 kg)   12/04/19 176 lb 9.6 oz (80.1 kg)   10/01/19 178 lb 6.4 oz (80.9 kg)     BP Readings from " Last 3 Encounters:   02/25/20 152/75   12/04/19 139/73   10/01/19 113/61       Lab Results   Component Value Date    WBC 4.5 08/10/2017    HGB 14.1 08/10/2017    HCT 43.0 08/10/2017     08/10/2017    CHOL 155 08/21/2018    TRIG 34 08/21/2018    HDL 62 08/21/2018    ALT 16 08/21/2018    AST 17 08/21/2018     08/21/2018    K 4.2 08/21/2018     (H) 08/21/2018    CREATININE 0.81 08/21/2018    BUN 18 08/21/2018    CO2 25 08/21/2018    PSA <0.1 12/02/2019       Immunization History   Administered Date(s) Administered     Pneumo Conj 13-V (2010&after) 05/14/2015     Pneumo Conj 7-V(before 2010) 07/30/2010     Pneumo Polysac 23-V 01/21/2016     Td, adult adsorbed, PF 09/28/2016     Td,adult,historic,unspecified 02/22/2005     ZOSTER, LIVE 08/13/2012     Review of Systems: A comprehensive review of systems was negative except as noted.     Medications and Allergies   Current Outpatient Medications   Medication Sig Dispense Refill     amLODIPine (NORVASC) 2.5 MG tablet TAKE 1 TABLET BY MOUTH ONCE DAILY 90 tablet 2     docusate sodium (COLACE) 100 MG capsule Take 100 mg by mouth 2 (two) times a day.       oxybutynin (DITROPAN XL) 5 MG ER tablet Take 5 mg by mouth daily.       tamsulosin (FLOMAX) 0.4 mg cap TAKE 2 CAPSULES(0.8 MG) BY MOUTH DAILY AFTER BREAKFAST 180 capsule 3     No current facility-administered medications for this visit.      No Known Allergies     Active Ambulatory Problems     Diagnosis Date Noted     Hypertension      Hyperactivity Of The Bladder      BPH (benign prostatic hyperplasia)      Elevated PSA 05/14/2015     Malignant neoplasm of prostate (H) 01/16/2019     Resolved Ambulatory Problems     Diagnosis Date Noted     Varicose Veins      Osteoarthritis Of The Shoulder      Past Medical History:   Diagnosis Date     Hypertension      Medical history non-contributory      Prostate cancer (H)      Past Surgical History:   Procedure Laterality Date     CHOLECYSTECTOMY       HERNIA  "REPAIR      1963 & 1974      Past Medical History:   Diagnosis Date     Hypertension      Medical history non-contributory      Prostate cancer (H)         Family and Social History   Family History   Problem Relation Age of Onset     Cancer Mother      Cancer Maternal Aunt         Social History     Tobacco Use     Smoking status: Never Smoker     Smokeless tobacco: Never Used   Substance Use Topics     Alcohol use: No     Alcohol/week: 0.0 - 1.0 standard drinks     Drug use: No        Physical Exam   General Appearance: Very pleasant, normal mental status, although may be cognitively a little slow, breathing comfortably, moves a little slowly around exam room, but gait seems mostly stable    /75 (Patient Site: Right Arm, Patient Position: Sitting, Cuff Size: Adult Large)   Pulse 73   Temp 97.6  F (36.4  C) (Oral)   Ht 5' 10\" (1.778 m)   Wt 173 lb 12.8 oz (78.8 kg)   SpO2 97%   BMI 24.94 kg/m      General: Alert, in no distress  Skin: + Sun damaged skin and actinic changes (he sees his dermatologist annually)  Eyes/nose/throat: Eyes without scleral icterus, eye movements normal, pupils equal and reactive, oropharynx clear,   + Wax partially occluding the right tympanic canal, left side clear  MSK: Neck with good ROM  Lymphatic: Neck without adenopathy or masses  Endocrine: Thyroid with no nodules to palpation  Pulm: Lungs clear to auscultation bilaterally  Cardiac: Heart with regular rate and rhythm, no murmur or gallop  GI: Abdomen soft, nontender. No palpable enlargement of liver or spleen  MSK: + Positive tenderness right sciatic notch, when he applies pressure to the area and as I maneuver him on the exam table  + Reduced muscle mass both quadriceps  Neuro: Moves all extremities, without focal weakness  Psych: Alert, normal mental status. Normal affect and speech  Rectal: + External hemorrhoid, left posterior, about 3 mm, with some clot, but no signs of recent bleeding       Additional Information "        Denton Head MD  Internal Medicine

## 2021-06-06 NOTE — TELEPHONE ENCOUNTER
Refill Approved    Rx renewed per Medication Renewal Policy. Medication was last renewed on   tamsulosin (FLOMAX) 0.4 mg cap 180 capsule 3 2/25/2020     Carin Martinez, [facility] Connection Triage/Med Refill 2/25/2020     Requested Prescriptions   Pending Prescriptions Disp Refills     tamsulosin (FLOMAX) 0.4 mg cap [Pharmacy Med Name: TAMSULOSIN HCL 0.4 MG CAPSULE] 180 capsule 2     Sig: TAKE 2 CAPSULES BY MOUTH ONCE DAILY AFTER BREAKFAST       Alfuzosin/Tamsulosin/Silodosin Refill Protocol  Passed - 2/25/2020  1:36 PM        Passed - PCP or prescribing provider visit in past 12 months       Last office visit with prescriber/PCP: Visit date not found OR same dept: 2/25/2020 Denton Head MD OR same specialty: 2/25/2020 Denton Head MD  Last physical: Visit date not found Last MTM visit: Visit date not found   Next visit within 3 mo: Visit date not found  Next physical within 3 mo: Visit date not found  Prescriber OR PCP: Dominique Whalen MD  Last diagnosis associated with med order: 1. Benign prostatic hyperplasia  - tamsulosin (FLOMAX) 0.4 mg cap [Pharmacy Med Name: TAMSULOSIN HCL 0.4 MG CAPSULE]; TAKE 2 CAPSULES BY MOUTH ONCE DAILY AFTER BREAKFAST  Dispense: 180 capsule; Refill: 2    If protocol passes may refill for 12 months if within 3 months of last provider visit (or a total of 15 months).

## 2021-06-06 NOTE — PROGRESS NOTES
[facility] Daily Progress     Patient Name: Jorge Dupree  Date: 3/12/2020  Visit #: 4/10  Authorization dates:  3/2/20-5/7/20  Referral Diagnosis: Gluteal tendinitis of right buttock  Referring provider: Denton Head MD  Visit Diagnosis:     ICD-10-CM    1. Gluteal tendinitis of right buttock  M76.01    2. Right leg pain  M79.604        Precautions / Restrictions : prostate cancer       Assessment:     Response to Intervention:  Tolerated manual therapy and progression of exercises well.  Patient's posture and gait are notably improving especially over the last 2 visits.     Symptoms are consistent with:  Medical diagnosis with sciatic nerve involvement.  Patient is appropriate to continue with skilled physical therapy intervention, as indicated by initial plan of care.    Goal Status:  Pt. will demonstrate/verbalize independence in self-management of condition in : 6 weeks  Pt. will be independent with home exercise program in : 6 weeks  Pt. will have improved quality of sleep: with less pain;waking less times/night;in 6 weeks  Pt. will be able to walk : 10 minutes;with less difficulty;with less pain;for household mobility;in 6 weeks  Patient will stand : 30 minutes;with less pain;with less difficultty;for home chores;in 6 weeks  Patient will transfer: sit/stand;supine/sit;for in/out of bed;for in/out of chair;with no pain;with less difficulty;in 6 weeks    No data recorded  Other functional progress:  Patient with decreased pain today.     Plan / Patient Education:     Continue with initial plan of care.  Progress with home program as tolerated.  Continue teasing out sciatica v stenosis  Plan for next time: Progress with increased manual therapy of left lumbar muscles and piriformis.     Subjective:     Bartolo feels like things are getting a little better.  When he is walking, things are not quite so painful. He has been consistently doing the exercises in the morning and in the evening.    Pain  "Ratin/10    Response to last treatment: felt pretty good  HEP- Frequency: 2x/day, Questions or difficulties: none.      Objective:     Walks with moderate forward trunk lean  Soft tissue tightness over the left piriformis region.       Treatment Today     Exercises:  Exercise #1: NuStep 3 mins at resistance level 5  Comment #1: with discussion of current symptoms, progress, and HEP  Exercise #2: supine LE nerve glide- not today  Comment #2: 10  Exercise #3:    Comment #3:    Exercise #4: seated nerve glide/ LAQ  Comment #4: 10 bilateral  Exercise #5: LTR cues to keep pain free  Comment #5: 10  Exercise #6: seated marching  Comment #6: 10 bilateral  Exercise #7: single knee to chest  Comment #7: 30\" each x 2 bilateral  Exercise #8: double knee to chest  Comment #8: not today.  Exercise #9: sit to stand without UE from table  Comment #9: 10  Exercise #10: standing hip abduction  Comment #10: 10 reps bilateral     MFR layers 1-3 right glut max, glut med, piriformis     TREATMENT MINUTES COMMENTS   Evaluation     Self-care/ Home management     Manual therapy 10    Neuromuscular Re-education     Therapeutic Activity     Therapeutic Exercises 18 See exercise flow-sheet for details.    Gait training     Modality__________________                Total 28    Blank areas are intentional and mean the treatment did not include these items.       Zack Isaac, PT  3/12/2020     "

## 2021-06-07 ENCOUNTER — COMMUNICATION - HEALTHEAST (OUTPATIENT)
Dept: SCHEDULING | Facility: CLINIC | Age: 81
End: 2021-06-07

## 2021-06-08 NOTE — PROGRESS NOTES
Office Visit - Follow Up   Jorge Dupree   79 y.o. male    Date of Visit: 5/26/2020    Chief Complaint   Patient presents with     Follow-up        Assessment and Plan     External hemorrhoids, most likely the source of the blood that he is seen in the toilet bowl, with history of small adenomatous colon polyps removed January 2015.      He continues to observe occasional blood streaking the stool, which would be consistent with hemorrhoids.  I reminded him about the importance of keeping the stool soft using stool softener, fiber supplement, and osmotic laxative MiraLAX.    I told him that we should keep in mind the possibility of doing a colonoscopy if he notices worsening of the bleeding, or some other new symptom disturbing his bowel habit.    Hemoglobin was 13.6 when measured at our last visit February 25.  Let us recheck that today.    On examination in February, he has an approximately 3 to 4 mm external hemorrhoid, with some clot inside.  I did not detect significant internal hemorrhoids.  He did have a colonoscopy performed January 2015 which disclosed 2 small adenomatous polyps that were removed.  These were less than 4 mm.  Also noted was long redundant colon.  Diverticulosis not noted.     Lab Results   Component Value Date    WBC 4.1 02/25/2020    HGB 13.6 (L) 02/25/2020    HCT 40.2 02/25/2020    MCV 88 02/25/2020     02/25/2020     Bladder urgency and frequency in the context of history of prostate cancer and pelvic radiation therapy that completed in April 2019.     He saw radiation oncology in December 2019.  He was told to continue with the pelvic floor exercises.  He uses an absorptive undergarment to prevent accidents.  He has been on a small dose of Ditropan XL 5 mg a day, and thought that it did help.  He found the medication disturbingly expensive, so back in February we switched to the immediate release oxybutynin, which he does find helpful.  He decided the tamsulosin was not  particularly helpful, so he stopped that.    He gets PSA checked twice a year, most recently December 2019, so we will check it today.    Essetial hypertension, well controlled,  On amlodipine up to 5 mg a day.    He showed me readings from his home blood pressure machine, which are mostly around 110/70.     We will continue on present dose of amlodipine 5 mg a day     Resolved right hip and buttock pain, possibly gluteal tendinitis, which I believe is on the basis of gluteal muscles and tendons, also has muscular deconditioning of his quadriceps  He did 6 sessions of physical therapy, and I reminded him about the importance of continuing the home exercises.  Another clinician did prescribe a brief course of methylprednisolone Dosepak, which was helpful.    He had pelvis and hip x-rays done January 2016 which showed some minor findings in the right hip joint of a small accessory office acetabulum and minimal degenerative changes in the SI joints.  I would send him back to physical therapy to review pelvic girdle strengthening and stabilization as well as flexibility. Also work on building up his quadriceps muscles, which will help with gait stability and reduce stress on his knees.     Earwax.    On examination today, both tympanic canals were about half way occluded with wax.  We gave him an ear wash today.  Afterwards, I reminded him to continue to do maintenance ear wash using over-the-counter kit like Debrox or Murine, do that twice a week    Actinic keratoses, working with his dermatologist.  Current on vaccinations, already received seasonal flu shot.    Return in 6 months         History of Present Illness   This 79 y.o. old comes for follow-up of multiple medical issues.    The symptom that he wanted to focus on today was continued observation of the streaking of bright red blood coating stool, likely from his external hemorrhoids which I diagnosed at our previous visit of  2-25-20  Issues are external  hemorrhoids, most likely the source of the blood that he is seen in the toilet bowl, with history of small adenomatous colon polyps removed January 2015.  Bladder urgency and frequency in the context of history of prostate cancer and pelvic radiation therapy that completed in April 2019.  Essential hypertension, probably reasonably well controlled, although I want to increase his amlodipine up to 5 mg a day.  Right hip and buttock pain, which I believe is on the basis of gluteal muscles and tendons, also has muscular deconditioning of his quadriceps, I will be referring him to physical therapy for strength and stabilization training.  Earwax.  Actinic keratoses, working with his dermatologist.  Current on vaccinations, already received seasonal flu shot.         Wt Readings from Last 3 Encounters:   05/26/20 175 lb 11.2 oz (79.7 kg)   02/25/20 173 lb 12.8 oz (78.8 kg)   12/04/19 176 lb 9.6 oz (80.1 kg)     BP Readings from Last 3 Encounters:   05/26/20 116/58   02/25/20 152/75   12/04/19 139/73     Lab Results   Component Value Date    WBC 4.1 02/25/2020    HGB 13.6 (L) 02/25/2020    HCT 40.2 02/25/2020     02/25/2020    CHOL 155 08/21/2018    TRIG 34 08/21/2018    HDL 62 08/21/2018    ALT 14 02/25/2020    AST 14 02/25/2020     02/25/2020    K 4.2 02/25/2020     02/25/2020    CREATININE 0.77 02/25/2020    BUN 16 02/25/2020    CO2 29 02/25/2020    PSA <0.1 12/02/2019         Medications, Allergies, Social, and Problem List   Current Outpatient Medications   Medication Sig Dispense Refill     amLODIPine (NORVASC) 5 MG tablet Take 1 tablet (5 mg total) by mouth daily. 90 tablet 3     docusate sodium (COLACE) 100 MG capsule Take 100 mg by mouth 2 (two) times a day.       oxybutynin (DITROPAN) 5 MG tablet Take 1 tablet (5 mg total) by mouth 2 (two) times a day. 60 tablet 11     tamsulosin (FLOMAX) 0.4 mg cap TAKE 2 CAPSULES(0.8 MG) BY MOUTH DAILY AFTER BREAKFAST 180 capsule 3     No current  facility-administered medications for this visit.      No Known Allergies  Social History     Tobacco Use     Smoking status: Never Smoker     Smokeless tobacco: Never Used   Substance Use Topics     Alcohol use: No     Alcohol/week: 0.0 - 1.0 standard drinks     Drug use: No     Patient Active Problem List   Diagnosis     Hypertension     Hyperactivity Of The Bladder     Malignant neoplasm of prostate (H)     External hemorrhoids     Gluteal tendinitis of right buttock        Reviewed, reconciled and updated       Physical Exam   General Appearance: Appears well, breathing comfortably, was easily around exam room    /58 (Patient Site: Right Arm, Patient Position: Sitting, Cuff Size: Adult Large)   Pulse 66   Temp 98.2  F (36.8  C) (Oral)   Wt 175 lb 11.2 oz (79.7 kg)   SpO2 96%   BMI 25.21 kg/m      Examination of both tympanic canals shows them to be mostly occluded with wax.  This was expertly irrigated by the clinical assistant, after which I observed that his tympanic membranes look normal.     Additional Information        Denton Head MD

## 2021-06-08 NOTE — PATIENT INSTRUCTIONS - HE
External hemorrhoids, most likely the source of the blood that he is seen in the toilet bowl, with history of small adenomatous colon polyps removed January 2015.      He continues to observe occasional blood streaking the stool, which would be consistent with hemorrhoids.  I reminded him about the importance of keeping the stool soft using stool softener, fiber supplement, and osmotic laxative MiraLAX.    I told him that we should keep in mind the possibility of doing a colonoscopy if he notices worsening of the bleeding, or some other new symptom disturbing his bowel habit.    Hemoglobin was 13.6 when measured at our last visit February 25.  Let us recheck that today.    On examination in February, he has an approximately 3 to 4 mm external hemorrhoid, with some clot inside.  I did not detect significant internal hemorrhoids.  He did have a colonoscopy performed January 2015 which disclosed 2 small adenomatous polyps that were removed.  These were less than 4 mm.  Also noted was long redundant colon.  Diverticulosis not noted.     Lab Results   Component Value Date    WBC 4.1 02/25/2020    HGB 13.6 (L) 02/25/2020    HCT 40.2 02/25/2020    MCV 88 02/25/2020     02/25/2020     Bladder urgency and frequency in the context of history of prostate cancer and pelvic radiation therapy that completed in April 2019.     He saw radiation oncology in December 2019.  He was told to continue with the pelvic floor exercises.  He uses an absorptive undergarment to prevent accidents.  He has been on a small dose of Ditropan XL 5 mg a day, and thought that it did help.  He found the medication disturbingly expensive, so back in February we switched to the immediate release oxybutynin, which he does find helpful.  He decided the tamsulosin was not particularly helpful, so he stopped that.    He gets PSA checked twice a year, most recently December 2019, so we will check it today.    Essetial hypertension, well controlled,  On  amlodipine up to 5 mg a day.    He showed me readings from his home blood pressure machine, which are mostly around 110/70.     We will continue on present dose of amlodipine 5 mg a day     Resolved right hip and buttock pain, possibly gluteal tendinitis, which I believe is on the basis of gluteal muscles and tendons, also has muscular deconditioning of his quadriceps  He did 6 sessions of physical therapy, and I reminded him about the importance of continuing the home exercises.  Another clinician did prescribe a brief course of methylprednisolone Dosepak, which was helpful.    He had pelvis and hip x-rays done January 2016 which showed some minor findings in the right hip joint of a small accessory office acetabulum and minimal degenerative changes in the SI joints.  I would send him back to physical therapy to review pelvic girdle strengthening and stabilization as well as flexibility. Also work on building up his quadriceps muscles, which will help with gait stability and reduce stress on his knees.     Earwax.    On examination today, both tympanic canals were about half way occluded with wax.  We gave him an ear wash today.  Afterwards, I reminded him to continue to do maintenance ear wash using over-the-counter kit like Debrox or Murine, do that twice a week    Actinic keratoses, working with his dermatologist.  Current on vaccinations, already received seasonal flu shot.    Return in 6 months

## 2021-06-12 NOTE — PROGRESS NOTES
[facility] Discharge Summary  Patient Name: Jorge Dupree  Date: 10/21/2020  Referral Diagnosis: Gluteal tendinitis of right buttock  Referring provider: Denton Head MD  Visit Diagnosis:   1. Gluteal tendinitis of right buttock     2. Right leg pain     3. Urinary urgency     4. Pelvic floor weakness     5. Essential hypertension     6. External hemorrhoids     7. Hypertonicity of bladder     8. Malignant neoplasm of prostate (H)         Goals:  No data recorded  No data recorded    Patient was seen for 5 visits physical therapy.    The patient attended therapy initially, but did not finish the therapy sessions prescribed.  Goals were not fully achieved. Explanation for goals not achieved: The patient discontinued therapy, did not return.    Therapy will be discontinued at this time.  Please see progress note dated 3/17/20 for patient status.      Thank you for your referral.  Zack Isaac, PT  10/21/2020  11:08 AM

## 2021-06-13 NOTE — PATIENT INSTRUCTIONS - HE
External hemorrhoids, most likely the source of the blood that he is seen in the toilet bowl, with history of small adenomatous colon polyps removed January 2015.      He told me the bleeding flares up from time to time.  He is using Colace stool softener and also the osmotic laxative MiraLAX.  I told him that if the hemorrhoids continue to be bothersome, then I would refer him to [facility] colorectal surgery, for consideration of hemorrhoidal banding.  He does have a history of prostate radiation therapy that completed in April 2019.  It is possible that radiation therapy exacerbated tendency for hemorrhoids, possibly by forming scar tissue that could be venous blood flow.     I told him that we should keep in mind the possibility of doing a colonoscopy if he notices worsening of the bleeding, or some other new symptom disturbing his bowel habit.    Hemoglobin was 13.6 when measured at our last visit February 25.    Lab Results   Component Value Date    HGB 13.7 (L) 05/26/2020     On examination in February 2020, he has an approximately 3 to 4 mm external hemorrhoid, with some clot inside.  I did not detect significant internal hemorrhoids.  He did have a colonoscopy performed January 2015 which disclosed 2 small adenomatous polyps that were removed.  These were less than 4 mm.  Also noted was long redundant colon.  Diverticulosis not noted.    Bladder urgency and frequency in the context of history of prostate cancer and pelvic radiation therapy that completed in April 2019.     He saw radiation oncology in December 2019.  He was told to continue with the pelvic floor exercises.  He uses an absorptive undergarment to prevent accidents.  He has been on a small dose of Ditropan XL 5 mg a day, and thought that it did help.  He found the medication disturbingly expensive, so back in February 2020 we switched to the immediate release oxybutynin, which he does find helpful    After consulting with Flavia, he resumed he  tamsulosin  Lab Results   Component Value Date    PSA <0.1 05/26/2020    PSA <0.1 12/02/2019    PSA <0.1 06/03/2019       Essential hypertension, well controlled,  On amlodipine up to 5 mg a day.    He showed me readings from his home blood pressure machine, which are mostly around 110/70.   We will continue on present dose of amlodipine 5 mg a day  BP Readings from Last 3 Encounters:   12/01/20 120/60   05/26/20 116/58   02/25/20 152/75     Wt Readings from Last 3 Encounters:   12/01/20 171 lb (77.6 kg)   05/26/20 175 lb 11.2 oz (79.7 kg)   02/25/20 173 lb 12.8 oz (78.8 kg)     Resolved right hip and buttock pain, possibly gluteal tendinitis, which I believe is on the basis of gluteal muscles and tendons, also has muscular deconditioning of his quadriceps  He did 6 sessions of physical therapy, and I reminded him about the importance of continuing the home exercises.    Actinic keratoses, working with his dermatologist.      Current on vaccinations, already received seasonal flu shot.  He is current on his pneumococcal vaccines.    Would benefit from strength training to build lean muscle mass.  I suggested video streaming offerings, for example from [employer].

## 2021-06-13 NOTE — PROGRESS NOTES
Clinic Note    Assessment:     Assessment and Plan:  1. Conjunctivitis  This appears to be all viral.  It is occurring during a URI.  Symptomatic treatment only.  Please see below.    2. Elevated PSA  I strongly encouraged him to see urology to discuss the options involved.  His exam was suspicious and PSA is elevated.    3. Heart murmur  I listened again and upright definitely heard this midsystolic click like finding, but in laying down.  There is an odd almost plop.  I strongly encouraged echocardiogram to document that things are or are not normal.       Patient Instructions   Viral conjunctivitis- no treatment needed unless or until your eyes are full of pus all day long.     Get the ECHO done to determine what this sound/murmur is.    See the urologists about the prostate issue. Soon    Return if symptoms worsen or fail to improve.         Subjective:      Jorge Dupree is a 76 y.o. male comes in with complaint of a few days worth of a viral URI type symptom followed by erythema in the eyes and some pus drainage for the past 2 nights.  Does not continue during the day.  There is a little gritty or grainy feeling.  He still recovering from the URI.  No blindness or blurring.  No mention of foreign body sensation.    Patient is not in the echocardiogram was seen urology leg recommended.  We discussed this in detail.  I reviewed his clinical exam from last visit as well as the orders.  I also reviewed the clinical exam of his prostate and PSA and recommended again that he see urology.    The following portions of the patient's history were reviewed and updated as appropriate: As mentioned above otherwise past medical problems allergies medications        Review of Systems:    As above.  He stopped the oxybutynin and symptoms did not change.  History   Smoking Status     Former Smoker   Smokeless Tobacco     Never Used         Objective:   There were no vitals filed for this visit.    Exam:  Patient looks well with  significant erythematous conjunctival.  No pus.  He is tearing.  Heart sounds upright show a midsystolic click left sternum but no murmur laying flat there was a little bit of murmur and it was more this plop that her last visit.  Sure if this just mitral valve prolapse and a thin person or not.    Patient Active Problem List   Diagnosis     Hypertension     Hyperactivity Of The Bladder     Benign Prostatic Hypertrophy     Elevated PSA     Current Outpatient Prescriptions   Medication Sig Dispense Refill     amLODIPine (NORVASC) 2.5 MG tablet TAKE 1 TABLET BY MOUTH EVERY DAY 90 tablet 3     ASPIRIN (ASPIR-81 ORAL) Take by mouth see administration instructions. Once a week       finasteride (PROSCAR) 5 mg tablet TAKE ONE TABLET BY MOUTH ONE TIME DAILY 90 tablet 2     tamsulosin (FLOMAX) 0.4 mg Cp24 Take 2 capsules (0.8 mg total) by mouth Daily after breakfast. 180 capsule 3     No current facility-administered medications for this visit.          Lennox Del Rio    9/18/2017

## 2021-06-13 NOTE — PROGRESS NOTES
Office Visit - Follow Up   Jorge Dupree   80 y.o. male    Date of Visit: 12/1/2020    Chief Complaint   Patient presents with     Follow-up     Prostate     Rectal Pain        -------------------------------------------------------------------------------------------------------------------------  Assessment and Plan    External hemorrhoids, most likely the source of the blood that he is seen in the toilet bowl, with history of small adenomatous colon polyps removed January 2015.      He told me the bleeding flares up from time to time.  He is using Colace stool softener and also the osmotic laxative MiraLAX.  I told him that if the hemorrhoids continue to be bothersome, then I would refer him to [facility] colorectal surgery, for consideration of hemorrhoidal banding.  He does have a history of prostate radiation therapy that completed in April 2019.  It is possible that radiation therapy exacerbated tendency for hemorrhoids, possibly by forming scar tissue that could contribute to venous congestion     I told him that we should keep in mind the possibility of doing a colonoscopy if he notices worsening of the bleeding, or some other new symptom disturbing his bowel habit.    Hemoglobin was 13.6 when measured at our last visit February 25.    Lab Results   Component Value Date    HGB 13.7 (L) 05/26/2020     On examination in February 2020, he has an approximately 3 to 4 mm external hemorrhoid, with some clot inside.  I did not detect significant internal hemorrhoids.  He did have a colonoscopy performed January 2015 which disclosed 2 small adenomatous polyps that were removed.  These were less than 4 mm.  Also noted was long redundant colon.  Diverticulosis not noted.    Bladder urgency and frequency in the context of history of prostate cancer and pelvic radiation therapy that completed in April 2019.     He saw radiation oncology in December 2019.  He was told to continue with the pelvic floor exercises.  He uses  an absorptive undergarment to prevent accidents.  He has been on a small dose of Ditropan XL 5 mg a day, and thought that it did help.  He found the medication disturbingly expensive, so back in February 2020 we switched to the immediate release oxybutynin, which he does find helpful    After consulting with Flavia, he resumed he tamsulosin  Lab Results   Component Value Date    PSA <0.1 05/26/2020    PSA <0.1 12/02/2019    PSA <0.1 06/03/2019       Essential hypertension, well controlled,  On amlodipine up to 5 mg a day.    He showed me readings from his home blood pressure machine, which are mostly around 110/70.   We will continue on present dose of amlodipine 5 mg a day  BP Readings from Last 3 Encounters:   12/01/20 120/60   05/26/20 116/58   02/25/20 152/75     Wt Readings from Last 3 Encounters:   12/01/20 171 lb (77.6 kg)   05/26/20 175 lb 11.2 oz (79.7 kg)   02/25/20 173 lb 12.8 oz (78.8 kg)     Resolved right hip and buttock pain, possibly gluteal tendinitis, which I believe is on the basis of gluteal muscles and tendons, also has muscular deconditioning of his quadriceps  He did 6 sessions of physical therapy, and I reminded him about the importance of continuing the home exercises.    Actinic keratoses, working with his dermatologist.      Current on vaccinations, already received seasonal flu shot.  He is current on his pneumococcal vaccines.    Would benefit from strength training to build lean muscle mass.  I suggested video streaming offerings, for example from [employer].       --------------------------------------------------------------------------------------------------------------------------  History of Present Illness  This 80 y.o. old follow-up for multiple issues including hemorrhoids, also needs recheck of PSA for history of prostate cancer    External hemorrhoids, most likely the source of the blood that he is seen in the toilet bowl, with history of small adenomatous colon  polyps removed January 2015.      He told me the bleeding flares up from time to time.  He is using Colace stool softener and also the osmotic laxative MiraLAX.  I told him that if the hemorrhoids continue to be bothersome, then I would refer him to [facility] colorectal surgery, for consideration of hemorrhoidal banding.  He does have a history of prostate radiation therapy that completed in April 2019.  It is possible that radiation therapy exacerbated tendency for hemorrhoids, possibly by forming scar tissue that could contribute to venous congestion     I told him that we should keep in mind the possibility of doing a colonoscopy if he notices worsening of the bleeding, or some other new symptom disturbing his bowel habit.    Hemoglobin was 13.6 when measured at our last visit February 25.    Lab Results   Component Value Date    HGB 13.7 (L) 05/26/2020       Lab Results   Component Value Date    PSA <0.1 05/26/2020    PSA <0.1 12/02/2019    PSA <0.1 06/03/2019         Wt Readings from Last 3 Encounters:   12/01/20 171 lb (77.6 kg)   05/26/20 175 lb 11.2 oz (79.7 kg)   02/25/20 173 lb 12.8 oz (78.8 kg)     BP Readings from Last 3 Encounters:   12/01/20 120/60   05/26/20 116/58   02/25/20 152/75     Immunization History   Administered Date(s) Administered     Pneumo Conj 13-V (2010&after) 05/14/2015     Pneumo Conj 7-V(before 2010) 07/30/2010     Pneumo Polysac 23-V 01/21/2016     Td, adult adsorbed, PF 09/28/2016     Td,adult,historic,unspecified 02/22/2005     ZOSTER, LIVE 08/13/2012     Lab Results   Component Value Date    WBC 4.3 05/26/2020    HGB 13.7 (L) 05/26/2020    HCT 42.2 05/26/2020     05/26/2020    CHOL 155 08/21/2018    TRIG 34 08/21/2018    HDL 62 08/21/2018    ALT 14 02/25/2020    AST 14 02/25/2020     02/25/2020    K 4.2 02/25/2020     02/25/2020    CREATININE 0.77 02/25/2020    BUN 16 02/25/2020    CO2 29 02/25/2020    PSA <0.1 05/26/2020       ---------------------------------------------------------------------------------------------------------------------------    Medications, Allergies, Social, and Problem List   Current Outpatient Medications   Medication Sig Dispense Refill     amLODIPine (NORVASC) 5 MG tablet Take 1 tablet (5 mg total) by mouth daily. 90 tablet 3     oxybutynin (DITROPAN) 5 MG tablet Take 1 tablet (5 mg total) by mouth 2 (two) times a day. 60 tablet 11     polyethylene glycol (MIRALAX) 17 gram packet Take 17 g by mouth daily as needed.       tamsulosin (FLOMAX) 0.4 mg cap Take 0.4 mg by mouth Daily after breakfast.       docusate sodium (COLACE) 100 MG capsule Take 100 mg by mouth 2 (two) times a day.       No current facility-administered medications for this visit.      No Known Allergies  Social History     Tobacco Use     Smoking status: Never Smoker     Smokeless tobacco: Never Used   Substance Use Topics     Alcohol use: No     Alcohol/week: 0.0 - 1.0 standard drinks     Drug use: No     Patient Active Problem List   Diagnosis     Hypertension     Hyperactivity Of The Bladder     External hemorrhoids     Personal history of prostate cancer     Urinary urgency        Reviewed, reconciled and updated       Physical Exam   General Appearance:   Appears well, good vital signs, no physical exam today    /60   Pulse 66   Wt 171 lb (77.6 kg)   SpO2 98%   BMI 24.54 kg/m           Additional Information   I spent 15 minutes face time with the patient, with > 50% counseling, explaining and discussing with the patient the issues enumerated in the Assessment and Plan section of this note and answering questions. Afterwards, the patient was given a printout of the AVS, with attention to the content in the Patient Instruction section.       Denton Head MD

## 2021-06-14 NOTE — PATIENT INSTRUCTIONS - HE
Tylenol arthritis 2 pills three times a day, every 8 hours.  Flexeryl muscle relaxant as needed.  You can get Voltaren gel or Asper cream with lidocaine OTC and apply on painful area three times a day.

## 2021-06-14 NOTE — PROGRESS NOTES
Assessment/Plan:        1. Fall, initial encounter  XR Lumbar Spine 2 or 3 VWS    XR Thoracic Spine 3 VWS   2. Abrasion of lip, initial encounter     3. Acute left-sided low back pain without sciatica  XR Lumbar Spine 2 or 3 VWS    XR Thoracic Spine 3 VWS    cyclobenzaprine (FLEXERIL) 5 MG tablet     1. Xray will be done to rule out vertebral fracture. For the back sprain, will use Tylenol and topical OTC meds. I sent Rx for Flexeryl only as needed. He will do mild stretching exercise.  2. He will apply some cold compresses on the lip and chin. No need for any wound care.    This note has been dictated using voice recognition software. Any grammatical or context distortions are unintentional and inherent to the software.      Return in about 2 weeks (around 2/17/2021) for Recheck.    Patient Instructions   Tylenol arthritis 2 pills three times a day, every 8 hours.  Flexeryl muscle relaxant as needed.  You can get Voltaren gel or Asper cream with lidocaine OTC and apply on painful area three times a day.            Subjective:    Jorge Dupree is a 80 y.o. male  here for    Chief Complaint   Patient presents with     Fall     had a fall yesterday 2/2/21 and hurt back.also hit lip and chin, Rt knee.     79 yo male with h/o prostate cancer and radiation treatment, tripped and fell yesterday outside. He fell forward so hit the ground with his right knee and his face. He has now small abrasion and swelling in the upper lip and tenderness on the chin. Since the fall, has pain in the left lower back. No radiation or propagation, worse with movement, no weakaness or numbness in the legs.  He took Tylenol last night and this morning and used lidocaine patch and that helped. He denies head trauma, loss of consciousness, headache, vision disturbance. He is not on blood thinners. He cannot tolerate NSAIDs, it gives him upset stomach.  Social History     Socioeconomic History     Marital status:      Spouse name: Not on  file     Number of children: 2     Years of education: Not on file     Highest education level: Not on file   Occupational History     Occupation: Retired     Employer: [employer]   Social Needs     Financial resource strain: Not on file     Food insecurity     Worry: Not on file     Inability: Not on file     Transportation needs     Medical: Not on file     Non-medical: Not on file   Tobacco Use     Smoking status: Never Smoker     Smokeless tobacco: Never Used   Substance and Sexual Activity     Alcohol use: No     Alcohol/week: 0.0 - 1.0 standard drinks     Drug use: No     Sexual activity: Not on file   Lifestyle     Physical activity     Days per week: Not on file     Minutes per session: Not on file     Stress: Not on file   Relationships     Social connections     Talks on phone: Not on file     Gets together: Not on file     Attends [employer] service: Not on file     Active member of club or organization: Not on file     Attends meetings of clubs or organizations: Not on file     Relationship status: Not on file     Intimate partner violence     Fear of current or ex partner: Not on file     Emotionally abused: Not on file     Physically abused: Not on file     Forced sexual activity: Not on file   Other Topics Concern     Not on file   Social History Narrative     Not on file       Family History   Problem Relation Age of Onset     Cancer Mother      Cancer Maternal Aunt      Review of Systems:     A 12 point comprehensive review of systems was negative except as noted in HPI.            Objective:    Physical Exam   /78   Pulse 74   Wt 169 lb (76.7 kg)   SpO2 99%   BMI 24.25 kg/m      Constitutional: oriented to person, place, and time, appears well-nourished. No distress.   HENT:   Head: Normocephalic.   Mouth/Throat: Oropharynx is clear and moist. Upper lip swollen and with 1-2 mm very small abrasion that looks clean and dry. Teeth not injured. Chin - no swelling or skin  openings.  Eyes: Conjunctivae are normal. Pupils are equal, round, and reactive to light.   Neck: Normal range of motion. Neck supple.   Cardiovascular: Normal rate, regular rhythm and normal heart sounds.    Pulmonary/Chest: Effort normal and breath sounds normal.  Abdominal: Soft. Bowel sounds are normal.   Musculoskeletal: Normal range of motion and normal gait. No bruised on the back. Tender and muscle spastic in the left lower back. No tenderness on percussion over the spine. DTRs, strength and sensation intact.   Neurological: alert and oriented to person, place, and time.  Psychiatric:  normal mood and affect.    Patient Active Problem List   Diagnosis     Hypertension     Hyperactivity Of The Bladder     External hemorrhoids     Personal history of prostate cancer     Urinary urgency       Current Outpatient Medications on File Prior to Visit   Medication Sig Dispense Refill     amLODIPine (NORVASC) 5 MG tablet Take 1 tablet (5 mg total) by mouth daily. 90 tablet 3     docusate sodium (COLACE) 100 MG capsule Take 100 mg by mouth 2 (two) times a day.       oxybutynin (DITROPAN) 5 MG tablet Take 1 tablet (5 mg total) by mouth 2 (two) times a day. 60 tablet 11     polyethylene glycol (MIRALAX) 17 gram packet Take 17 g by mouth daily as needed.       tamsulosin (FLOMAX) 0.4 mg cap Take 0.4 mg by mouth Daily after breakfast.       No current facility-administered medications on file prior to visit.                Dominique Whalen  2/3/2021

## 2021-06-15 NOTE — TELEPHONE ENCOUNTER
RN cannot approve Refill Request    RN can NOT refill this medication med is not covered by policy/route to provider. Last office visit: 12/1/2020 Denton Head MD Last Physical: Visit date not found Last MTM visit: Visit date not found Last visit same specialty: 2/3/2021 Dominique Whalen MD.  Next visit within 3 mo: Visit date not found  Next physical within 3 mo: Visit date not found      Jasper Coronado, [facility] Connection Triage/Med Refill 2/11/2021    Requested Prescriptions   Pending Prescriptions Disp Refills     oxybutynin (DITROPAN) 5 MG tablet [Pharmacy Med Name: OXYBUTYNIN 5 MG TABLET] 180 tablet 3     Sig: TAKE 1 TABLET BY MOUTH TWICE A DAY       There is no refill protocol information for this order      Signed Prescriptions Disp Refills    amLODIPine (NORVASC) 5 MG tablet 90 tablet 3     Sig: TAKE 1 TABLET BY MOUTH EVERY DAY       Calcium-Channel Blockers Protocol Passed - 2/11/2021  2:42 AM        Passed - PCP or prescribing provider visit in past 12 months or next 3 months     Last office visit with prescriber/PCP: 12/1/2020 Denton Head MD OR same dept: 2/3/2021 Dominique Whalen MD OR same specialty: 2/3/2021 Dominique Whalen MD  Last physical: Visit date not found Last MTM visit: Visit date not found   Next visit within 3 mo: Visit date not found  Next physical within 3 mo: Visit date not found  Prescriber OR PCP: Denton Head MD  Last diagnosis associated with med order: 1. Hypertension  - amLODIPine (NORVASC) 5 MG tablet; TAKE 1 TABLET BY MOUTH EVERY DAY  Dispense: 90 tablet; Refill: 3    2. Hyperactivity Of The Bladder  - oxybutynin (DITROPAN) 5 MG tablet [Pharmacy Med Name: OXYBUTYNIN 5 MG TABLET]; TAKE 1 TABLET BY MOUTH TWICE A DAY  Dispense: 180 tablet; Refill: 3    If protocol passes may refill for 12 months if within 3 months of last provider visit (or a total of 15 months).             Passed - Blood pressure filed in past 12 months     BP Readings from Last 1 Encounters:    02/03/21 134/78

## 2021-06-15 NOTE — TELEPHONE ENCOUNTER
Called pt to ask about following Dr. Chen or just his PCP and [facility]. Pt stated that he has been getting PSA draws done at Dr. Head's office and they have been good. F/u with Dr. Alejandro as well and he stated the same.  Pt stated he wasn't sure if he needed to f/u with Dr. Chen, but wanted him to know that he enjoyed speaking with him and thanks him for his care.  He also stated that if he runs into any issues that the other DrJes's can't answer, he will give Dr. Chen a call. PSA results from December/2020 are in Epic. Will request updated records from [facility].    ----- Message from Teagan Diaz RN sent at 1/6/2021 10:08 AM CST -----  Regarding: overdue for f/u with Dr. chen  Pt last seen by Dr. Chen in 12/2019 and was to be seen six months later. He is NOT overdue for a PSA as his PCP has drawn these. Can you please call the patient to see if he'd like to continue to f/u with Dr. Chen as initially planned or just PCP doing labs...please document what pt prefers.   Teagan JAIN RN  Rad [facility].   ----- Message -----  From: SYSTEM  Sent: 1/4/2021  12:02 AM CST  To: [facility] Support Pool

## 2021-06-15 NOTE — TELEPHONE ENCOUNTER
Refill Approved    Rx renewed per Medication Renewal Policy. Medication was last renewed on 2/25/20.    Jasper Coronado, [facility] Triage/Med Refill 2/11/2021     Requested Prescriptions   Pending Prescriptions Disp Refills     amLODIPine (NORVASC) 5 MG tablet [Pharmacy Med Name: AMLODIPINE BESYLATE 5 MG TAB] 90 tablet 3     Sig: TAKE 1 TABLET BY MOUTH EVERY DAY       Calcium-Channel Blockers Protocol Passed - 2/11/2021  2:42 AM        Passed - PCP or prescribing provider visit in past 12 months or next 3 months     Last office visit with prescriber/PCP: 12/1/2020 Denton Head MD OR same dept: 2/3/2021 Dominique Whalen MD OR same specialty: 2/3/2021 Dominique Whalen MD  Last physical: Visit date not found Last MTM visit: Visit date not found   Next visit within 3 mo: Visit date not found  Next physical within 3 mo: Visit date not found  Prescriber OR PCP: Denton Head MD  Last diagnosis associated with med order: 1. Hypertension  - amLODIPine (NORVASC) 5 MG tablet [Pharmacy Med Name: AMLODIPINE BESYLATE 5 MG TAB]; TAKE 1 TABLET BY MOUTH EVERY DAY  Dispense: 90 tablet; Refill: 3    2. Hyperactivity Of The Bladder  - oxybutynin (DITROPAN) 5 MG tablet [Pharmacy Med Name: OXYBUTYNIN 5 MG TABLET]; TAKE 1 TABLET BY MOUTH TWICE A DAY  Dispense: 180 tablet; Refill: 3    If protocol passes may refill for 12 months if within 3 months of last provider visit (or a total of 15 months).             Passed - Blood pressure filed in past 12 months     BP Readings from Last 1 Encounters:   02/03/21 134/78                oxybutynin (DITROPAN) 5 MG tablet [Pharmacy Med Name: OXYBUTYNIN 5 MG TABLET] 180 tablet 3     Sig: TAKE 1 TABLET BY MOUTH TWICE A DAY       There is no refill protocol information for this order

## 2021-06-16 NOTE — PROGRESS NOTES
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      TYPE OF RADIATION THERAPY ADMINISTERED:  7942 cGy in 45 treatments given from 2/19/2019-5/2/2019.      INTERVAL SINCE COMPLETION OF RADIATION THERAPY: 1 year and 11 months.      SUBJECTIVE:  Mr. Dupree is a 79 y.o. male who has been in his usual state of good health until recently.  The patient presented with history of abnormal elevation PSA from 4.1 in 5/2015 to 7 in 9/2016 to 8.48/2017 and to 10.2 in 8/2018.  The patient had some moderate urinary urgency over the past a few years and denies any other symptoms at the time of evaluation.  His initial rectal exam is reviewed 2+ enlarged prostate gland with left lobe firmness.  Initial MRI prostate on 9/20/2018 reviewed Pi-Rads 5 lesion in the left posterior lateral peripheral zone highly suspicious for malignancy.  The estimated prostate volume was 51 cc.  The lesion makes broad capsular contact with the blurring at the neurovascular bundle origin.  No evidence of ivania and skeletal metastasis.  He underwent transrectal ultrasound study and biopsy on 11/1/2018.  The pathology revealed the prostatic adenocarcinoma, Torrance grade 4+3 = 7 and 3+4 = 7 involving up to 90% biopsy tissue in 15/15 cores bilaterally.  He underwent staging workup including CT abdomen pelvics and the bone scan which showed no radiographic evidence of ivania and systemic metastasis. Patient received short-term hormone therapy and definitive external beam radiation therapy for his prostate cancer and had radiation therapy in our clinic with a total dose of 7942 cGy in 45 treatments given from 2/19/2019-5/2/2019.  The patient did have significant dysuria before and during the  radiation therapy for which he required penile clamp to maintain his bladder full during the therapy.     The patient has been gradually recovering well since completion of the radiation therapy.  He has been followed with physical therapy with significant bladder control improvement.  His bladder function continue to stable since last visit.   He is AUA score is 15/35.  He otherwise denies any bowel irritations.  The patient did notice intermittent rectal bleeding to 3 times per month over the past few months.  He lost follow-up with us due to Covid.  He is here for routine postradiation therapy office follow-up.    Medications were reviewed and are up to date on EPIC.    The following portions of the patient's history were reviewed and updated as appropriate: allergies, current medications, past family history, past medical history, past social history, past surgical history and problem list.    Review of Systems:      General  Constitutional (WDL): Exceptions to WDL  Fatigue: Fatigue relieved by rest  EENT  Eye Disorder (WDL): All eye disorder elements are within defined limits(wears glasses)  Ear Disorder (WDL): All ear disorder elements are within defined limits  Respiratory   Respiratory (WDL): Within Defined Limits  Cardiovascular  Cardiovascular (WDL): All cardiovascular elements are within defined limits  Endocrine     Gastrointestinal  Gastrointestinal (WDL): Exceptions to WDL(good appetite but losing weight)  Constipation: Occasional or intermittent symptoms, occasional use of stool softeners, laxatives, dietary modification, or enema(hemorrhiods)  Musculoskeletal  Musculoskeletal and Connetive Tissue Disorders (WDL): All Musculoskeletal and Connetive Tissue Disorder elements are within defined limits  Integumentary               Integumentary (WDL): All integumentary elements are within defined limits(follows with dermatologist)  Neurological  Neurosensory (WDL): All neurosensory elements are within  defined limits  Psychological/Emotional   Patient Coping: Accepting;Open/discussion  Hematological/Lymphatic  Lymph (WDL): All lymph disorder elements are within defined limits  Dermatologic     Genitourinary/Reproductive  Genitourinary (WDL): Exceptions to WDL(nocturia 4-5x/night)  Urinary Frequency: Present(urgency)  Urinary Retention: Urinary, suprapubic or intermittent catheter placement not indicated, able to void with some residual  Reproductive     Pain              Currently in Pain: No/denies  Accompanied by  Accompanied by: Family Member(wife)    Objective:      PHYSICAL EXAMINATION:    /58   Pulse 73   Temp 98  F (36.7  C) (Oral)   Wt 170 lb 9.6 oz (77.4 kg)   SpO2 98%   BMI 24.48 kg/m      Gen: Alert, in NAD  Eyes: PERRL, EOMI, sclera anicteric  HENT     Head: NC/AT     Ears: No external auricular lesions     Nose/sinus: No rhinorrhea or epistaxis     Oropharynx: MMM, no visible oral lesions  Neck: Supple, full ROM, no LAD  Pulm: No wheezing, stridor or respiratory distress  CV: Well-perfused, no cyanosis, no pedal edema  Abdominal: BS+, soft, nontender, nondistended, no hepatomegaly  Back: No step-offs or pain to palpation along the thoracolumbar spine  Rectal: Deferred.  He had rectal examination by Dr. Head during the last visit.  : Deferred  Musculoskeletal: Normal muscle bulk and tone  Skin: Normal color and turgor  Neurologic: A/Ox3, CN II-XII intact, normal gait and station  Psychiatric: Appropriate mood and affect     Lab Results   Component Value Date    PSA 0.1 12/01/2020    PSA <0.1 05/26/2020    PSA <0.1 12/02/2019    PSA <0.1 06/03/2019    PSA 10.2 (H) 08/21/2018    PSA 8.4 (H) 08/10/2017    PSA 6.7 (H) 09/28/2016    PSA 4.1 05/14/2015    PSA 4.7 09/23/2014    PSA 3.8 07/23/2014      Impression     The patient is a 80-year-old gentleman with a diagnosis of unfavorable intermediate risk prostate cancer status post short-term hormone therapy and a definitive external beam radiation  therapy 1 year and 11 months ago.  Patient has been recovering well with no clinical evidence of cancer recurrence.    Assessment & Plan:     1.  Continue pelvic floor exercises as recommended from physical therapy to improve his bladder function.  Patient is due to see Dr. Head in 1 month with PSA.  He is scheduled to return to radiation oncology in 12 months for another office follow-up and PSA.  2.  Continue follow-up with Dr. Alejandro, urology and Henri, PCP as planned.  3.  Recommend GI endoscopy if he has persistent rectal bleeding.  The patient will follow this is Dr. Head.     Face to face time  25 minutes with > 75% spent on consultation, education and coordination of care.          Lorenza Merino MD, PhD  Department of Radiation Oncology   [facility]  Tel: [phone]  Page: [phone]    [facility]  [address] [place] [address]     [address]   [place] [address]    CC:  Patient Care Team:  Denton Head MD as PCP - General (Internal Medicine)  Nika Granados RN as Oncology Nurse Navigator  Lorenza Merino MD as Physician (Radiation Oncology)  Itz Alejandro MD as Physician (Urology)  Denton Head MD as Assigned PCP  Lorenza Merino MD as Assigned Cancer Care Provider

## 2021-06-16 NOTE — PROGRESS NOTES
Patient here ambulatory accompanied by wife for follow up s/p radiation for his prostate cancer.  Patient states he is continuing to have urinary symptoms and recently saw Dr. Adkins at [facility].  PSA done by primary doctor.  AUA completed.  Seen by Dr. Merino.  Plan RTC for follow up as directed by physician.

## 2021-06-20 NOTE — PROGRESS NOTES
Ear cerumen removal  Date/Time: 8/22/2018 9:39 AM  Performed by: TESHA WALL  Authorized by: TESHA WALL     Anesthesia:  Local Anesthetic: none  Location details: right ear  Procedure type: curette    Sedation:  Patient sedated: no  Patient tolerance: Patient tolerated the procedure well with no immediate complications  Comments: This was done bilaterally by me.

## 2021-06-20 NOTE — PROGRESS NOTES
Assessment and Plan:     1. Elevated PSA  His PSA is elevated and has been rising.  He had a suspicious prostate exam last year when he saw Dr. Middleton.  He had been recommended to see urology however, he felt that due to his age there was not much that would be done for him.  I strongly encouraged him to see urology.  He wanted to know if he could cancel the appointment if his PSA comes back down.  I advised him against that.  He will meet with our specialty schedulers to get this set up.  - PSA (Prostatic-Specific Antigen), Annual Screen  - Ambulatory referral to Urology    2. Hyperactivity Of The Bladder  Stable on meds.    3. Hypertension  Stable and well controlled  - Comprehensive Metabolic Panel  - Lipid Cascade    4. Routine general medical examination at a health care facility  Age appropriate cancer screens and immunizations are UTD.  He has an ACD and can get us a copy.    5. Heart murmur  He has a soft systolic murmur.  He occasionally experiences dizziness climbing stairs.  We will get an echocardiogram.  - Echo Complete; Future        The patient's current medical problems were reviewed.    I have had an Advance Directives discussion with the patient.  The following health maintenance schedule was reviewed with the patient and provided in printed form in the after visit summary:   Health Maintenance   Topic Date Due     INFLUENZA VACCINE RULE BASED (1) 08/01/2018     FALL RISK ASSESSMENT  08/21/2019     COLONOSCOPY  01/22/2020     ADVANCE DIRECTIVES DISCUSSED WITH PATIENT  01/21/2021     TD 18+ HE  09/28/2026     PNEUMOCOCCAL POLYSACCHARIDE VACCINE AGE 65 AND OVER  Completed     PNEUMOCOCCAL CONJUGATE VACCINE FOR ADULTS (PCV13 OR PREVNAR)  Completed     ZOSTER VACCINE  Completed        Subjective:   Chief Complaint: Jorge Dupree is an 77 y.o. male here for an Annual Wellness visit.   HPI: Jorge is a very pleasant 77-year-old gentleman here today with his wife for his annual visit.  He previously had  seen Dr. Middleton.  I reviewed the last notes.  Last year, a suspicious prostate exam as well as an elevated PSA were noted along with a new murmur.  Jorge has not followed up on Dr. Middleton's recommendations for seeing urology in getting an echocardiogram.    He has an advanced care directive.  His wife is here with him today and confirms that.  She had no questions or concerns.    He monitors his blood pressure periodically at a pharmacy.  Sometimes the systolic can be as high as 135.    Review of Systems:  CV: occasional dizziness climbing stairs  Please see above.  The rest of the review of systems are negative for all systems.    Patient Care Team:  Lennox Del Rio MD as PCP - General (Internal Medicine)     Patient Active Problem List   Diagnosis     Hypertension     Hyperactivity Of The Bladder     Benign Prostatic Hypertrophy     Elevated PSA     Past Medical History:   Diagnosis Date     Medical history non-contributory       No past surgical history on file.   Family History   Problem Relation Age of Onset     Family history unknown: Yes      Social History     Social History     Marital status:      Spouse name: N/A     Number of children: N/A     Years of education: N/A     Occupational History     Not on file.     Social History Main Topics     Smoking status: Former Smoker     Smokeless tobacco: Never Used     Alcohol use No     Drug use: No     Sexual activity: Not on file     Other Topics Concern     Not on file     Social History Narrative      Current Outpatient Prescriptions   Medication Sig Dispense Refill     amLODIPine (NORVASC) 2.5 MG tablet TAKE 1 TABLET BY MOUTH EVERY DAY 90 tablet 0     ASPIRIN (ASPIR-81 ORAL) Take by mouth see administration instructions. Once a week       finasteride (PROSCAR) 5 mg tablet TAKE 1 TABLET BY MOUTH EVERY DAY 90 tablet 0     tamsulosin (FLOMAX) 0.4 mg Cp24 TAKE 2 CAPSULES(0.8 MG) BY MOUTH DAILY AFTER BREAKFAST 180 capsule 2     No current  "facility-administered medications for this visit.       Objective:   Vital Signs:   Visit Vitals     /66     Pulse 78     Temp 98.2  F (36.8  C)     Ht 5' 10.5\" (1.791 m)     Wt 175 lb 11.2 oz (79.7 kg)     SpO2 97%     BMI 24.85 kg/m2        VisionScreening:  No exam data present     PHYSICAL EXAM:  Constitutional:  Reveals an alert, pleasant adult male.   Vitals:  Noted.   Ears: TM's normal bilaterally following removal of impacted cerumen (see procedure note)  Eyes: PERRL, conjunctiva/corneas clear, EOM's intact   Throat: Lips, mucosa, and tongue normal; teeth and gums normal   Neck: Normal ROM, no carotid bruits, no thyromegaly   Lungs: Clear to auscultation bilaterally, respirations unlabored.   Heart: Regular rate and rhythm, soft II/VI systolic murmur, no rub, ? gallop,   Abdomen: Soft, non-tender, bowel sounds active, no masses, no organomegaly   Extremities: Extremities normal, atraumatic, no cyanosis or edema   Skin: Skin color, texture, turgor normal, no rashes or lesions   Rectal: deferred as he's being referred to urology  Neurologic: grossly normal       Assessment Results 8/21/2018   Activities of Daily Living No help needed   Instrumental Activities of Daily Living No help needed   Mini Cog Total Score 4   Some recent data might be hidden     A Mini-Cog score of 0-2 suggests the possibility of dementia, score of 3-5 suggests no dementia    Identified Health Risks:     Patient's advanced directive was discussed and I am comfortable with the patient's wishes.        "

## 2021-06-22 NOTE — PROGRESS NOTES
Patient here ambulatory accompanied by wife for radiation consult for his prostate cancer.  Patient here to discuss treatment options.  25 minutes spent in review of radiation process and potential side effects.  Written information given for review: ACS RT book, Jaison RT handouts, RT to pelvis handout, full bladder instructions, discussion about fiducial placement, HE class schedule, doctor bio card, team photo sheet and cancer care welcome letter.  Seen by Dr. Merino.  Plan RTC for follow up as directed by physician.

## 2021-06-22 NOTE — PROGRESS NOTES
Jorge and his wife came in to discuss with me questions they had regarding radiation vs surgery.  Jorge did decide upon radiation but then had questions regarding the full treatment plan.  I spent a couple hours going through information and answering questions of theirs to the best of my ability.  I have sent a message with a list of questions to Dr. Merino.  I will plan to draft up a treatment plan schedule and get to Jorge.  They had questions about MyChart.  I helped them get signed up for an account.  I encouraged they contact me if needed in the future for resources.  I gave them each my card as well.

## 2021-06-22 NOTE — CONSULTS
Consults   [facility] Radiation Oncology Consult Note     Patient: Jorge Dupree  MRN: [id number]  Date of Service: 12/12/2018          Itz Alejandro MD  [id number]  [place] [address]       Dear Dr. Alejandro:    Thank you very much for referring this patient for consideration of radiotherapy. As you know Mr. Dupree is a 78 y.o. 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  Patient is referred to radiation oncology for evaluation and discussion of possible treatment options including radiation therapy.    HISTORY OF PRESENT ILLNESS:   Mr. Dupree is a 78 y.o. male who has been in his usual state of good health until recently.  The patient presented with history of abnormal elevation PSA from 4.1 in 5/2015 to 7 in 9/2016 to 8.48/2017 and to 10.2 in 8/2018.  The patient had some moderate urinary urgency over the past a few years and denies any other symptoms at the time of evaluation.  His initial rectal exam is reviewed 2+ enlarged prostate gland with left lobe firmness.  Initial MRI prostate on 9/20/2018 reviewed Pi-Rads 5 lesion in the left posterior lateral peripheral zone highly suspicious for malignancy.  The estimated prostate volume was 51 cc.  The lesion makes broad capsular contact with the blurring at the neurovascular bundle origin.  No evidence of ivania and skeletal metastasis.  He underwent transrectal ultrasound study and biopsy on 11/1/2018.  The pathology revealed the prostatic adenocarcinoma, Shemar grade 4+3 = 7 and 3+4 = 7 involving up to 90% biopsy tissue in 15/15 cores bilaterally.  He underwent staging workup including CT abdomen pelvics and the bone scan which showed no radiographic evidence of ivania and systemic metastasis.  You have discussed with the patient about different treatment options.  He is referred to radiation  oncology for evaluation and discussion of possible treatment options including radiation therapy.    CHEMOTHERAPY HISTORY: Concurrent Chemotherapy: No    RADIATION THERAPY HISTORY: Prior Radiation: No    IMPLANTED CARDIAC DEVICE: none     Current Outpatient Medications   Medication Sig Dispense Refill     amLODIPine (NORVASC) 2.5 MG tablet TAKE 1 TABLET BY MOUTH EVERY DAY 30 tablet 0     finasteride (PROSCAR) 5 mg tablet TAKE 1 TABLET BY MOUTH EVERY DAY 90 tablet 0     tamsulosin (FLOMAX) 0.4 mg cap TAKE 2 CAPSULES(0.8 MG) BY MOUTH DAILY AFTER BREAKFAST 180 capsule 3     leuprolide (LUPRON DEPOT, 4 MONTH,) 30 mg injection Inject 30 mg into the shoulder, thigh, or buttocks every 4 (four) months. 1 kit 0     No current facility-administered medications for this visit.      Past Medical History:   Diagnosis Date     Hypertension      Medical history non-contributory      Prostate cancer (H)      Past Surgical History:   Procedure Laterality Date     CHOLECYSTECTOMY       HERNIA REPAIR      1963 & 1974     Patient has no known allergies.  Family History   Problem Relation Age of Onset     Cancer Mother      Cancer Maternal Aunt      Social History     Socioeconomic History     Marital status:      Spouse name: Not on file     Number of children: Not on file     Years of education: Not on file     Highest education level: Not on file   Social Needs     Financial resource strain: Not on file     Food insecurity - worry: Not on file     Food insecurity - inability: Not on file     Transportation needs - medical: Not on file     Transportation needs - non-medical: Not on file   Occupational History     Not on file   Tobacco Use     Smoking status: Never Smoker     Smokeless tobacco: Never Used   Substance and Sexual Activity     Alcohol use: No     Drug use: No     Sexual activity: Not on file   Other Topics Concern     Not on file   Social History Narrative     Not on file        Review of Systems:       General  General (WDL): All general elements are within defined limits  EENT  ENT (WDL): Exceptions to WDL  Glasses or Contacts: Yes - Chronic (Greater than 3 months)(glasses)  Respiratory   Respiratory (WDL): All respiratory elements are within defined limits  Cardiovascular  Cardiovascular (WDL): All cardiovascular elements are within defined limits  Endocrine  Endocrine (WDL): All endocrine elements are within defined limits  Gastrointestinal  Gastrointestinal (WDL): All gastrointestinal elements are within defined limits  Musculoskeletal  Musculoskeletal (WDL): Exceptions to WDL  Joint pain: Yes - Recent (Less than 3 months)  Back Pain: Yes - Recent (Less than 3 months)  Integumentary                  Neurological  Neurological (WDL): All neurological elements are within defined limits  Dominant Hand: Right  Psychological/Emotional   Psychological/Emotional (WDL): All psychological/emotional elements are within defined limits  Hematological/Lymphatic  Hematological/Lymphatic (WDL): All hematological/lymphatic elements are within defined limits  Dermatologic  Dermatologic (WDL): All dermatological elements are within defined limits  Genitourinary/Reproductive  Genitourinary/Reproductive (WDL): Exceptions to WDL  Urinary Frequency: Yes - Chronic (Greater than 3 months)  Urination more than 2 times a night: Yes - Chronic (Greater than 3 months)  Urinary Urgency: Yes - Chronic (Greater than 3 months)  Reproductive     Pain              Currently in Pain: No/denies  Accompanied by     Imaging: Reviewed    Pathology: Reviewed    ECOG Peformance Status  ECOG Performance Status: 0  Distress Assessment Score: 3      Objective:     PHYSICAL EXAMINATION:    /81   Pulse 76   Temp 97.9  F (36.6  C) (Oral)   Wt 185 lb 9.6 oz (84.2 kg)   SpO2 97%   BMI 26.25 kg/m      Gen: Alert, in NAD  Eyes: PERRL, EOMI, sclera anicteric  HENT     Head: NC/AT     Ears: No external auricular lesions     Nose/sinus: No  rhinorrhea or epistaxis     Oropharynx: MMM, no visible oral lesions  Neck: Supple, full ROM, no LAD  Pulm: No wheezing, stridor or respiratory distress  CV: Well-perfused, no cyanosis, no pedal edema  Abdominal: BS+, soft, nontender, nondistended, no hepatomegaly  Back: No step-offs or pain to palpation along the thoracolumbar spine  Rectal: Deferred  : Deferred  Musculoskeletal: Normal muscle bulk and tone  Skin: Normal color and turgor  Neurologic: A/Ox3, CN II-XII intact, normal gait and station  Psychiatric: Appropriate mood and affect    Intent of Therapy: Curative  Side effects that may occur during or within weeks after Radiation Therapy      Fatigue and general weakness    Nausea, vomiting and decrease in appetite    Pain on urination, frequent urge to urinate, blood in the urine, difficulty starting and decrease in the urine stream, retention of urine, and leakage of urine    Diarrhea, frequent, urgent and painful bowel movements, and blood in the stool    Darkening, irritation, itchiness, redness, dryness,and peeling of the skin of the pelvis     Loss of hair on the pelvis and groin    Side effects that may occur months or years after Radiation Therapy      Development of another tumor or cancer    Thickening, telangiectasias (development of spider like blood vessels in the skin) and ulceration of the skin of the pelvis    Decrease in function of the kidneys and liver    Ulcer and bleeding from the intestine or rectum    Obstruction of the bowel    Fistula of the rectum    Swelling of the feet and legs    Poor healing after a trauma or surgery in the irradiated area    Nerve damage resulting in loss of strength and sensation    Infertiility (sterility)    Painful ejaculation or impotence    The risks, benefits and alternatives to radiation therapy were outlined with the patient. All questions were answered and a consent was signed.     Impression     Unfavorable intermediate risk prostate cancer,  clinical stage T2 cN0 M0, Shemar grade 4+3=7 and 3+4 =7 involving up to 90% of biopsy tissue bilaterally, and pretherapy PSA of 10.2.     Assessment & Plan:     I have personally reviewed his upcoming medical record today.  I have also discussed with the patient about all the possible treatment options for prostate cancer including clinical observation, hormone therapy, surgery in the form of radical and robotic prostatectomy, radiation therapy in the form of radioactive seed implant, and SBRT/IMPR/IGRT/randy therapy.  The possible risks and side effects of radiation therapy has also been explained to the patient in detail and at great length.  Questions are answered to patient's satisfaction.  This is a 78-year-old gentleman with a good health status and the recent diagnosis of unfavorable intermediate risk prostate cancer. Given the status of T2c disease, [place] grade 7 and a pretherapy PSA of 10.2, I think the patient has approximately 12% chance of organ-confined disease, 33% chance to have capsular penetration, 22% risk of seminal vesicle involvement and 32% risk of lymph node metastasis based on Dr. Goss's table.  The pros and the cons of different treatment options have also been discussed with the patient in detail and at a great length.  He seems to understand it well.  Given given the evidence of unfavorable intermediate risk disease and good health status, I think it is reasonable to consider some form of therapy.  The pros and the cons of different treatment options have also been discussed with the patient in detail and at great length.  He seems to understand it well.  After long discussion, the patient elected to proceed with short-term hormonal therapy along with definitive external beam radiation therapy as his treatment choice for his prostate cancer being aware of potential risks and side effects involved.  He will be scheduled to have her first Lupron injection ASAP and prostate markers  placement before proceed with simulation.  I plan to give him radiation therapy at 180 cGy fraction to a total of 7920 cGy in 44 treatments targeted to the prostate gland and 4500 cGy in 25 treatments targeted to the pelvic lymph nodes.     Again, thank you very much for the referral and allowing me to participate in the care of this patient.  If you have any questions or concerns about this consultation, please do not hesitate to call.  I spent approximately 60 minutes today with the patient and 80% time was used for counseling.      Sincerely,          Lorenza Merino MD, PhD  Department of Radiation Oncology   [facility]  Tel: [phone]  Page: [phone]    [facility]  [address] [place] [address]     [address] [place] [address]    CC:  Patient Care Team:  Dahlia Álvarez MD as PCP - General (Internal Medicine)  Nika Granados, ROSEMARY as Oncology Nurse Navigator  Lorenza Merino MD as Physician (Radiation Oncology)  Itz Alejandro MD as Physician (Urology)

## 2021-06-23 NOTE — PROGRESS NOTES
[facility] Radiation Oncology Follow Up Note    Patient: Jorge Dupree  MRN: [id number]  Date of Service: 01/16/2019          Mr. Dupree is a 78 y.o. male who has been in his usual state of good health until recently.  The patient presented with history of abnormal elevation PSA from 4.1 in 5/2015 to 7 in 9/2016 to 8.48/2017 and to 10.2 in 8/2018.  The patient had some moderate urinary urgency over the past a few years and denies any other symptoms at the time of evaluation.  His initial rectal exam is reviewed 2+ enlarged prostate gland with left lobe firmness.  Initial MRI prostate on 9/20/2018 reviewed Pi-Rads 5 lesion in the left posterior lateral peripheral zone highly suspicious for malignancy.  The estimated prostate volume was 51 cc.  The lesion makes broad capsular contact with the blurring at the neurovascular bundle origin.  No evidence of ivania and skeletal metastasis.  He underwent transrectal ultrasound study and biopsy on 11/1/2018.  The pathology revealed the prostatic adenocarcinoma, Shemar grade 4+3 = 7 and 3+4 = 7 involving up to 90% biopsy tissue in 15/15 cores bilaterally.  He underwent staging workup including CT abdomen pelvics and the bone scan which showed no radiographic evidence of ivania and systemic metastasis.      The patient was seen and evaluated on 12/12/2018.  He decide to proceed with short-term hormonal therapy along with definitive external beam radiation therapy.  He had a prostate markers placement on 1/7/2019 and 6 months Eligard injection was also given on the same day.  The patient is here for further evaluation and discussion of treatment options for his prostate cancer.       Impression      Unfavorable intermediate risk prostate cancer, clinical stage T2 cN0 M0, [place] grade 4+3=7 and 3+4 =7 involving up to 90% of biopsy tissue bilaterally, and pretherapy PSA of 10.2.      Assessment & Plan:      I have personally reviewed his upcoming medical record today.  I have also  discussed with the patient about all the possible treatment options for prostate cancer including clinical observation, hormone therapy, surgery in the form of radical and robotic prostatectomy, radiation therapy in the form of radioactive seed implant, and SBRT/IMPR/IGRT/randy therapy.  The possible risks and side effects of radiation therapy has also been explained to the patient in detail and at great length.  Questions are answered to patient's satisfaction.  The patient had prostate markers placement and 6 months Eligard injection on 1/7/2019 and is ready to proceed with definitive external beam radiation therapy.  He therefore is scheduled to return to radiation oncology in 4 weeks for simulation. I plan to give him radiation therapy at 180 cGy fraction to a total of 7920 cGy in 44 treatments targeted to the prostate gland and 4500 cGy in 25 treatments targeted to the pelvic lymph nodes.      I spent approximately 40 minutes today with the patient and 80% time was used for counseling.      Lorenza Merino MD, PhD  Department of Radiation Oncology   [facility]  Tel: [phone]  Page: [phone]    [facility]  [address]  [place] [address]     [place] [id number] [place] [address]  [place] [address]

## 2021-06-23 NOTE — PROGRESS NOTES
Patient here ambulatory accompanied by wife for follow up on prostate cancer and treatment plan.  Patient had fiducials placed last week along with an Eligard injection.  Seen by Dr. Merino.  Plan RTC for CT simulation as directed by physician.

## 2021-06-23 NOTE — TELEPHONE ENCOUNTER
Refill Approved    Rx renewed per Medication Renewal Policy. Medication was last renewed on 10/29/18.    Ov: 8/21/18    Chika Quiñones, [facility] Triage/Med Refill 1/18/2019     Requested Prescriptions   Pending Prescriptions Disp Refills     finasteride (PROSCAR) 5 mg tablet 90 tablet 0     Sig: Take 1 tablet (5 mg total) by mouth daily.    There is no refill protocol information for this order

## 2021-06-23 NOTE — PROCEDURES
Clinical Treatment Planning Note    The complex radiotherapy planning will be completed for the patient for his prostate cancer.  The patient had a planning CT earlier today for planning.  The treatment aids were used for planning, including vac-loc immobilization.  The therapy planning is necessary to reduce radiotherapy dose to the normal critical structures which is not possible with simple treatment planning.  In addition, dose to the target and the critical structures will require three-dimensional analysis of the isodose distribution.  This planning will be done to reduce dose to rectum, small bowel, and the urinary bladder, penile bulb and femoral heads.     I will contour the clinical tumor volume  CTV , with extended volume of planning treatment volume  PTV  on the treatment planning system.  The critical structures will be outlined, including urinary to rectum, small bowel, and urinary bladder, penile bulb, femoral heads, bones, and soft tissue.     The treatment planning will be done on the computer treatment planning system.  I will consider to use multiple gantry positions to achieve optimal PTV coverage.  The dose distribution to the above critical structures will be reviewed.  The isodose distribution along the X, Y, Z plan will be also reviewed.  Custom blocking will be used to shield normal structures.  The beam s eye view will be reviewed and the digital reconstructed imaging will be reviewed on the planning software.  The IMRT/IGRT technique will be used to deliver optimal dose to the tumor and to protect normal tissue.  The patient will receive a total dose of 7920 cGy in 44 treatments targeted to the prostate gland and 4500 cGy in 25 treatments targeted to the pelvic LNs using 6-MV photon.       Lorenza Merino MD, PhD  Department of Radiation Oncology   [facility]  Tel: [phone]  Page: [phone]    [facility]  [address] [place], [place] [id number] [place] [address]  [address]  [place] [address]

## 2021-06-23 NOTE — PROCEDURES
Procedures  SIMULATION NOTE:    DIAGNOSIS:  Unfavorable intermediate risk prostate cancer, clinical stage T2 cN0 M0, [place] grade 4+3=7 and 3+4 =7 involving up to 90% of biopsy tissue bilaterally, and pretherapy PSA of 10.2.     INDICATION:  After discussing with the patient about various treatment options, the patient elected to proceed with short-term hormonal therapy and definitive external beam radiation therapy for his prostate cancer.    CONSENT:  The possible risks and side effects of radiation therapy have been discussed with the patient in detail and at a great length.  Questions are answered to patient's satisfaction.  The written consent was obtained.    SIMULATION:  The patient is in a supine position with a headrest and a vac loc between the bilateral lower extremities to help keep the same position during the daily radiation therapy.  Tentative isocenter is set up in the center of the pelvic region.  We will acquire CT information to help us to better locate the target and design the radiation therapy field.    BLOCKS:  Custom blocks will be drawn to minimize radiation to normal tissues and to protect normal organs including, but not limited to, urinary bladder, rectum, small bowels, bone and soft tissue.    DOSAGE:  I plan to give him radiation therapy at 180 cGy each fraction to a total of 7920 cGy in 44 treatments targeted to the prostate gland and 4500 cGy in 25 treatments targeted to the pelvic lymph nodes. I will consider to use IMRT/IGRT technique to help us to better locate the target and to protect normal tissues.      Lorenza Merino MD, PhD  Department of Radiation Oncology   [facility]  Tel: [phone]  Page: [phone]    [facility]  [address] [place] [address]     [facility]  [address] [facility] [place] [address]

## 2021-06-24 NOTE — PROGRESS NOTES
RADIATION ONCOLOGY WEEKLY TREATMENT VISIT NOTE      Assessment / Impression       1. Malignant neoplasm of prostate (H)       Cancer Staging  No matching staging information was found for the patient.   Tolerating radiation therapy well.  All questions and concerns addressed.   fx today  TD 1080  Doing well    Plan:     Continue radiation treatment as prescribed.  Radiation: Site: Prostate  Stereotactic Radiosurgery: No  Concurrent Therapy: No  Today's Dose: 1080  Total Dose for Pelvis: 7920  Today's Fraction/Total Fraction Pelvis:     Per plan    Subjective:      HPI: Jorge Dupree is a 78 y.o. male with    1. Malignant neoplasm of prostate (H)         The following portions of the patient's history were reviewed and updated as appropriate: allergies, current medications, past family history, past medical history, past social history, past surgical history and problem list.    Assessment                  Body Site: Pelvis Site: Prostate  Stereotactic Radiosurgery: No  Concurrent Therapy: No  Today's Dose: 1080  Total Dose for Pelvis: 7920  Today's Fraction/Total Fraction Pelvis:   Drainage: 0: Absent  Diarrhea W/O Colostomy: 0: None  Constipation: 1: Requiring stool softner or dietary modifation  Proctitis: 0: None  Urinary frequency and/or urgency: 1: Increase in frequency or nocturia up to two times normal  Urinary Retention: 0 : Normal  Urinary Incontinence: 2: 1-2 episodes of urinary incontinence in 24 hrs  Dysuria: 0: None  Nocturia: 2-4  Urine Color Change: 0: Normal                                            Sexuality Alteration                 Emotional Alteration Copin: Effective  Comfort Alteration KPS: 100 % Normal, no complaints  Fatigue (ONS scale) : 0: No Fatigue  Pain Location: denies   Nutrition Alteration Anorexia: 0: None  Nausea: 0: None  Vomitin: None  Skin Alteration Skin Sensation: 0: No problem  Skin Reaction: 0: None  AUA Assessment                                   Accompanied by       Objective:     Exam:     Vitals:    02/26/19 0915   BP: (!) 187/93   Pulse: 77   Temp: 97.8  F (36.6  C)   TempSrc: Oral   SpO2: 97%   Weight: 181 lb 3.2 oz (82.2 kg)       Wt Readings from Last 8 Encounters:   02/26/19 181 lb 3.2 oz (82.2 kg)   02/19/19 189 lb 4.8 oz (85.9 kg)   01/16/19 189 lb 11.2 oz (86 kg)   12/12/18 185 lb 9.6 oz (84.2 kg)   08/30/18 175 lb (79.4 kg)   08/21/18 175 lb 11.2 oz (79.7 kg)   08/10/17 172 lb 14.4 oz (78.4 kg)   12/21/16 177 lb 1.6 oz (80.3 kg)       General: Alert and oriented, in no acute distress  Jorge has no Erythema.    Treatment Summary to Date    Aria chart and setup information reviewed    Alfredo Salas MD

## 2021-06-24 NOTE — PROGRESS NOTES
Miladys AMARO called (She's at  treating and I'm working at [facility] today) and said patient is questioning what he can take for having a bowel movement. I see that he isn't on any regular bowel medications. I told her to tell him to try OTC docusate twice a day, but if he hasn't had a BM in several days he'd have to do an OTC laxative such as Miralax. She said she'd tell him.

## 2021-06-24 NOTE — PROGRESS NOTES
RADIATION ONCOLOGY WEEKLY TREATMENT VISIT NOTE      Assessment / Impression       1. Malignant neoplasm of prostate (H)  tolterodine (DETROL LA) 4 MG ER capsule   2. Hyperactivity Of The Bladder  tolterodine (DETROL LA) 4 MG ER capsule     Tolerating radiation therapy well.  All questions and concerns addressed.    Plan:     Continue radiation treatment as prescribed.  The patient still have a significant urinary urgency.  He has been on Flomax twice daily for a few years without any significant improvement.  I will prescribe Detrol LA 4 mg daily to improve his urinary urgency.  I have also discussed with the patient about pelvic floor exercise and biofeedback for bladder control.    Subjective:      HPI: Jorge Dupree is a 78 y.o. male with    1. Malignant neoplasm of prostate (H)  tolterodine (DETROL LA) 4 MG ER capsule   2. Hyperactivity Of The Bladder  tolterodine (DETROL LA) 4 MG ER capsule       The following portions of the patient's history were reviewed and updated as appropriate: allergies, current medications, past family history, past medical history, past social history, past surgical history and problem list.    Assessment                  Body Site: Pelvis Site: Prostate  Stereotactic Radiosurgery: No  Concurrent Therapy: No  Today's Dose: 180  Total Dose for Pelvis: 7920  Today's Fraction/Total Fraction Pelvis:   Drainage: 0: Absent  Diarrhea W/O Colostomy: 0: None  Constipation: 1: Requiring stool softner or dietary modifation  Proctitis: 0: None  Urinary frequency and/or urgency: 1: Increase in frequency or nocturia up to two times normal  Urinary Retention: 0 : Normal  Urinary Incontinence: 1: Stress incontinence  Dysuria: 0: None  Nocturia: 2-4  Urine Color Change: 0: Normal                                            Sexuality Alteration                 Emotional Alteration Copin: Effective  Comfort Alteration KPS: 100 % Normal, no complaints  Fatigue (ONS scale) : 0: No  Fatigue  Pain Location: denies   Nutrition Alteration Anorexia: 0: None  Nausea: 0: None  Vomitin: None  Skin Alteration Skin Sensation: 0: No problem  Skin Reaction: 0: None  AUA Assessment                                  Accompanied by       Objective:     Exam: Examination reviewed no significant changes.    Vitals:    19 0918   BP: (!) 177/95   Pulse: 73   Temp: 97.8  F (36.6  C)   TempSrc: Oral   SpO2: 99%   Weight: 189 lb 4.8 oz (85.9 kg)       Wt Readings from Last 8 Encounters:   19 189 lb 4.8 oz (85.9 kg)   19 189 lb 11.2 oz (86 kg)   18 185 lb 9.6 oz (84.2 kg)   18 175 lb (79.4 kg)   18 175 lb 11.2 oz (79.7 kg)   08/10/17 172 lb 14.4 oz (78.4 kg)   16 177 lb 1.6 oz (80.3 kg)   10/06/16 178 lb 8 oz (81 kg)       General: Alert and oriented, in no acute distress  Jorge has no Erythema.    Treatment Summary to Date    Aria chart and setup information reviewed    Lorenza Merino MD

## 2021-06-24 NOTE — PROGRESS NOTES
RADIATION ONCOLOGY WEEKLY TREATMENT VISIT NOTE      Assessment / Impression       1. Malignant neoplasm of prostate (H)  Ambulatory referral to Physical Therapy   2. Hyperactivity Of The Bladder  Ambulatory referral to Physical Therapy     Tolerating radiation therapy well.  All questions and concerns addressed.    Plan:     Continue radiation treatment as prescribed.  Patient so far has been tolerating the penile clamp well.  Discussed with the patient but pelvic exercise.  Refer patient to physical therapy for evaluation and treatment.    Subjective:      HPI: Jorge Dupree is a 78 y.o. male with    1. Malignant neoplasm of prostate (H)  Ambulatory referral to Physical Therapy   2. Hyperactivity Of The Bladder  Ambulatory referral to Physical Therapy       The following portions of the patient's history were reviewed and updated as appropriate: allergies, current medications, past family history, past medical history, past social history, past surgical history and problem list.    Assessment                  Body Site: Pelvis Site: Prostate  Stereotactic Radiosurgery: No  Concurrent Therapy: No  Today's Dose: 2700  Total Dose for Pelvis: 7920  Today's Fraction/Total Fraction Pelvis: 15/43  Drainage: 0: Absent  Diarrhea W/O Colostomy: 0: None  Constipation: 0: None  Proctitis: 0: None  Urinary frequency and/or urgency: 2: Increase more than two times normul but less than hourly  Urinary Retention: 0 : Normal  Urinary Incontinence: 2: 1-2 episodes of urinary incontinence in 24 hrs  Dysuria: 0: None  Nocturia: 5-6  Urine Color Change: 0: Normal                                            Sexuality Alteration                 Emotional Alteration Copin: Effective  Comfort Alteration KPS: 90% Can perform normal activity, minor signs of disease  Fatigue (ONS scale) : 6: Moderate Fatigue;5: Moderate Fatigue  Pain Location: denies   Nutrition Alteration Anorexia: 0: None  Nausea: 0: None  Vomitin:  None  Skin Alteration Skin Sensation: 0: No problem  Skin Reaction: 0: None  AUA Assessment                                  Accompanied by       Objective:     Exam: Examination reviewed no significant changes.    Vitals:    03/12/19 0937   BP: 153/81   Pulse: 84   Temp: 98.1  F (36.7  C)   TempSrc: Oral   SpO2: 97%   Weight: 180 lb 12.8 oz (82 kg)       Wt Readings from Last 8 Encounters:   03/12/19 180 lb 12.8 oz (82 kg)   03/05/19 181 lb 11.2 oz (82.4 kg)   02/26/19 181 lb 3.2 oz (82.2 kg)   02/19/19 189 lb 4.8 oz (85.9 kg)   01/16/19 189 lb 11.2 oz (86 kg)   12/12/18 185 lb 9.6 oz (84.2 kg)   08/30/18 175 lb (79.4 kg)   08/21/18 175 lb 11.2 oz (79.7 kg)       General: Alert and oriented, in no acute distress  Jorge has no Erythema.  Aria chart and setup information reviewed    Lorenza Merino MD

## 2021-06-24 NOTE — PROGRESS NOTES
RADIATION ONCOLOGY WEEKLY TREATMENT VISIT NOTE      Assessment / Impression       1. Malignant neoplasm of prostate (H)       Tolerating radiation therapy well.  All questions and concerns addressed.    Plan:     Continue radiation treatment as prescribed.  Patient is not able to hold bladder due to urinary frequency and stress incontinence.  This is a chronic condition and patient has been on medication without any significant improvement.  We will use the penile clamp during the course of radiation therapy.    Subjective:      HPI: Jorge Dupree is a 78 y.o. male with    1. Malignant neoplasm of prostate (H)         The following portions of the patient's history were reviewed and updated as appropriate: allergies, current medications, past family history, past medical history, past social history, past surgical history and problem list.    Assessment                  Body Site: Pelvis Site: Prostate  Stereotactic Radiosurgery: No  Concurrent Therapy: No  Today's Dose: 1800  Total Dose for Pelvis: 7920  Today's Fraction/Total Fraction Pelvis: 10/43  Drainage: 0: Absent  Diarrhea W/O Colostomy: 0: None  Constipation: 1: Requiring stool softner or dietary modifation  Proctitis: 0: None  Urinary frequency and/or urgency: 1: Increase in frequency or nocturia up to two times normal  Urinary Retention: 0 : Normal  Urinary Incontinence: 2: 1-2 episodes of urinary incontinence in 24 hrs  Dysuria: 0: None  Nocturia: 2-4  Urine Color Change: 0: Normal                                            Sexuality Alteration                 Emotional Alteration Copin: Effective  Comfort Alteration KPS: 100 % Normal, no complaints  Fatigue (ONS scale) : 2: Mild Fatigue  Pain Location: denies   Nutrition Alteration Anorexia: 0: None  Nausea: 0: None  Vomitin: None  Skin Alteration Skin Sensation: 0: No problem  Skin Reaction: 0: None  AUA Assessment                                  Accompanied by       Objective:      Exam: Examination reviewed no significant changes.    Vitals:    03/05/19 0853   BP: (!) 191/81   Pulse: 78   Temp: 97.6  F (36.4  C)   TempSrc: Oral   SpO2: 98%   Weight: 181 lb 11.2 oz (82.4 kg)       Wt Readings from Last 8 Encounters:   03/05/19 181 lb 11.2 oz (82.4 kg)   02/26/19 181 lb 3.2 oz (82.2 kg)   02/19/19 189 lb 4.8 oz (85.9 kg)   01/16/19 189 lb 11.2 oz (86 kg)   12/12/18 185 lb 9.6 oz (84.2 kg)   08/30/18 175 lb (79.4 kg)   08/21/18 175 lb 11.2 oz (79.7 kg)   08/10/17 172 lb 14.4 oz (78.4 kg)       General: Alert and oriented, in no acute distress  Jorge has no Erythema.  Aria chart and setup information reviewed    Lorenza Merino MD

## 2021-06-24 NOTE — TELEPHONE ENCOUNTER
Patient called this afternoon stating he had some blood in his urine the first couple voids after treatment this morning.  Patient denied dysuria, but has had urinary symptoms such as frequency and incontinence since before starting treatment.  Asked patient how much fluid he has drank since this morning and he said maybe 1-2 glasses of fluid.  Encouraged patient to drink 20 ounces of fluids in the next hour so that he would not be up all night but get himself hydrated.  Also encouraged patient to  some cranberry juice to drink to help with symptoms.  Told patient I would talk with Dr. Merino and if he would like to do anything differently I would call him back.  Otherwise encouraged patient to come as scheduled in the morning for treatment.  Patient able to verbalize understanding.   Talked with Dr. Merino and he wants patient to do as above and will see how he tolerates the next couple days.  Will continue to encourage and reinforce with patient to drink more fluid throughout the day.

## 2021-06-25 NOTE — TELEPHONE ENCOUNTER
Patient reports that last night he was going to the bathroom to urinate and he fainted. His wife was home and heard him fall and came into the bathroom. Loss of consciousness was brief, wife reports that patient seemed confused for a few minutes after. Patient reports that he felt fine leading up to the episode and is currently feeling fine now. Patient had been outside a little bit yesterday, then was inside in air conditioning. Patient said he was drinking fluids yesterday. Patient reports that he hit his left shoulder when he fell, there is a bruise and an abrasion. Pain is mild, he is able to move his shoulder fine. Patient also says that his lower back is a little sore. Patient denies previous fainting episodes, denies any heart hx or cardiac dysrhythmias.     Per protocol ED now or office with PCP approval. Message will be sent to provider to advise if patient can be seen in clinic or if he should go into the ED. Please advise    Ramonita Aaron RN, BSN Nurse Triage Advisor 8:29 AM 6/7/2021      Reason for Disposition    Age > 50 years    Additional Information    Negative: Still unconscious    Negative: Still feels dizzy or lightheaded    Negative: Difficult to awaken or acting confused (e.g., disoriented, slurred speech)    Negative: Difficulty breathing    Negative: Bluish (or gray) lips or face    Negative: Shock suspected (e.g., cold/pale/clammy skin, too weak to stand, low BP, rapid pulse)    Negative: Bleeding (e.g., vomiting blood, rectal bleeding or tarry stools, severe vaginal bleeding)    Negative: Chest pain    Negative: Extra heart beats or heart is beating fast (i.e., palpitations)    Negative: Heart beating < 50 beats per minute OR > 140 beats per minute    Negative: Fainted suddenly after medicine, allergic food or bee sting    Negative: Sounds like a life-threatening emergency to the triager    Negative: Has diabetes (diabetes mellitus) and fainting from low blood sugar (i.e., < 70 mg/dL or  3.9 mmol/L)    Negative: Seizure suspected (e.g., muscle jerking or shaking followed by confusion)    Negative: Heat exhaustion suspected (i.e., dehydration from heat exposure)    Negative: Fainted > 15 minutes ago and still looks pale (pale skin, pallor)    Negative: Fainted > 15 minutes ago and still feels weak or dizzy    Negative: History of heart problems or congestive heart failure    Negative: Occurred during exercise    Negative: Any head or face injury    Protocols used: [id number]    COVID 19 Nurse Triage Plan/Patient Instructions    Please be aware that novel coronavirus (COVID-19) may be circulating in the community. If you develop symptoms such as fever, cough, or SOB or if you have concerns about the presence of another infection including coronavirus (COVID-19), please contact your health care provider or visit  [email or web address].    Disposition/Instructions    ED Visit recommended. Follow protocol based instructions.      Bring Your Own Device:  Please also bring your smart device(s) (smart phones, tablets, laptops) and their charging cables for your personal use and to communicate with your care team during your visit.      Thank you for taking steps to prevent the spread of this virus.  o Limit your contact with others.  o Wear a simple mask to cover your cough.  o Wash your hands well and often.    Resources    [facility]: About COVID-19: [email or web address]    CDC: What to Do If You're Sick: www.cdc.gov/coronavirus/2019-ncov/about/steps-when-sick.html    CDC: Ending Home Isolation: www.cdc.gov/coronavirus/2019-ncov/hcp/disposition-in-home-patients.html     CDC: Caring for Someone: www.cdc.gov/coronavirus/2019-ncov/if-you-are-sick/care-for-someone.html     [facility]: Interim Guidance for Hospital Discharge to Home: www.health.[facility].[place]/diseases/coronavirus/hcp/hospdischarge.pdf    [facility] clinical trials (COVID-19 research studies):  clinicalaffairs.[employer].[facility]/[employer]-clinical-trials     Below are the COVID-19 hotlines at the [place] Department of [facility] ([facility]). Interpreters are available.   o For health questions: Call 586-836-4500 or 1-930.549.8593 (7 a.m. to 7 p.m.)  o For questions about schools and childcare: Call 257-148-6078 or 1-804.150.6222 (7 a.m. to 7 p.m.)

## 2021-06-25 NOTE — PROGRESS NOTES
Office Visit - Follow Up   Jorge Dupree   80 y.o. male    Date of Visit: 6/2/2021    Chief Complaint   Patient presents with     Follow-up        -------------------------------------------------------------------------------------------------------------------------  Assessment and Plan    Here for follow-up since our last visit of December 1, 2020, continues to do well, and he is now been vaccinated for COVID-19.    The two issues he wanted to discuss today is his bladder and fatigue    Bladder urgency and frequency in the context of history of prostate cancer and pelvic radiation therapy that completed in April 2019, currently on tamsulosin and immediate release oxybutynin     He told me today June 2, 2021 that he gets up about 3-4 times a night to urinate.  Once his bladder gets going, the stream is good.  But sometimes he feels like he might not empty completely.  He still has some leakage, and uses an absorptive garment to present prevent accidents.    He had a follow-up visit with radiation oncology April 14, 2021 and was assessed to be doing well.  I reminded Mr. Dupree to continue to do the pelvic floor exercises.    He thinks the tamsulosin and oxybutynin do benefit him symptomatically.  I suggested that he discuss this symptom at his next follow-up with urology and radiation oncology.    His PSA has remained near 0, check in April 2021.  I do not think there is any concern for infection.    DISEASE TREATED: Unfavorable intermediate risk prostate cancer, clinical stage T2 cN0 M0, [place] grade 4+3=7 and 3+4 =7 involving up to 90% of biopsy tissue bilaterally, and pretherapy PSA of 10.2.      TYPE OF RADIATION THERAPY ADMINISTERED:  7942 cGy in 45 treatments given from 2/19/2019-5/2/2019      Lab Results   Component Value Date    PSA 0.1 12/01/2020    PSA <0.1 05/26/2020    PSA <0.1 12/02/2019     Mild fatigue, which is noticed over the past several months   I do not find any localized symptoms or findings,  so I will run routine blood work, since it has been a little over a year.  We will get comprehensive metabolic panel, blood cell counts, and check thyroid cascade.    Recovered from a slip and fall that was the reason for office visit February 3, 2021     External hemorrhoids, most likely the source of the blood that he is seen in the toilet bowl, with history of small adenomatous colon polyps removed January 2015.       He told me the bleeding flares up from time to time.  He is using Colace stool softener and also the osmotic laxative MiraLAX.  I reminded him to adjust the dose of MiraLAX and/or Metamucil to achieve soft stools, but not trigger diarhea     It is possible that radiation therapy exacerbated tendency for hemorrhoids, possibly by forming scar tissue that could contribute to venous congestion    On examination in February 2020, he has an approximately 3 to 4 mm external hemorrhoid, with some clot inside.  I did not detect significant internal hemorrhoids.  He did have a colonoscopy performed January 2015 which disclosed 2 small adenomatous polyps that were removed.  These were less than 4 mm.  Also noted was long redundant colon.  Diverticulosis not noted.     Essential hypertension, well controlled,  On amlodipine up to 5 mg a day.    We will continue on present dose of amlodipine 5 mg a day  BP Readings from Last 3 Encounters:   06/02/21 108/68   04/14/21 120/58   02/03/21 134/78     Lab Results   Component Value Date    CREATININE 0.77 02/25/2020    BUN 16 02/25/2020     02/25/2020    K 4.2 02/25/2020     02/25/2020    CO2 29 02/25/2020     Resolved right hip and buttock pain, possibly gluteal tendinitis, which I believe is on the basis of gluteal muscles and tendons, also has muscular deconditioning of his quadriceps  He did 6 sessions of physical therapy, and I reminded him about the importance of continuing the home exercises.    Actinic keratoses, working with his dermatologist.       Current on vaccinations, already received seasonal flu shot.  He is current on his pneumococcal vaccines.    Would benefit from strength training to build lean muscle mass.     COVID-19,CHARLA,Pfizer 02/16/2021, 03/09/2021     --------------------------------------------------------------------------------------------------------------------------  History of Present Illness  This 80 y.o. old     Here for follow-up since our last visit of December 1, 2020, continues to do well, and he is now been vaccinated for COVID-19.    The two issues he wanted to discuss today is his bladder and fatigue    Bladder urgency and frequency in the context of history of prostate cancer and pelvic radiation therapy that completed in April 2019, currently on tamsulosin and immediate release oxybutynin     He told me today June 2, 2021 that he gets up about 3-4 times a night to urinate.  Once his bladder gets going, the stream is good.  But sometimes he feels like he might not empty completely.  He still has some leakage, and uses an absorptive garment to present prevent accidents.    He had a follow-up visit with radiation oncology April 14, 2021 and was assessed to be doing well.  I reminded Mr. Dupree to continue to do the pelvic floor exercises.    He thinks the tamsulosin and oxybutynin do benefit him symptomatically.  I suggested that he discuss this symptom at his next follow-up with urology and radiation oncology.    His PSA has remained near 0, check in April 2021.  I do not think there is any concern for infection.    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      TYPE OF RADIATION THERAPY ADMINISTERED:  7942 cGy in 45 treatments given from 2/19/2019-5/2/2019      Lab Results   Component Value Date    PSA 0.1 12/01/2020    PSA <0.1 05/26/2020    PSA <0.1 12/02/2019     Mild  fatigue, which is noticed over the past several months   I do not find any localized symptoms or findings, so I will run routine blood work, since it has been a little over a year.  We will get comprehensive metabolic panel, blood cell counts, and check thyroid cascade.     Wt Readings from Last 3 Encounters:   06/02/21 168 lb 8 oz (76.4 kg)   04/14/21 170 lb 9.6 oz (77.4 kg)   02/03/21 169 lb (76.7 kg)     BP Readings from Last 3 Encounters:   06/02/21 108/68   04/14/21 120/58   02/03/21 134/78       Lab Results   Component Value Date    WBC 4.3 05/26/2020    HGB 13.7 (L) 05/26/2020    HCT 42.2 05/26/2020     05/26/2020    CHOL 155 08/21/2018    TRIG 34 08/21/2018    HDL 62 08/21/2018    ALT 14 02/25/2020    AST 14 02/25/2020     02/25/2020    K 4.2 02/25/2020     02/25/2020    CREATININE 0.77 02/25/2020    BUN 16 02/25/2020    CO2 29 02/25/2020    PSA 0.1 12/01/2020          ---------------------------------------------------------------------------------------------------------------------------    Medications, Allergies, Social, and Problem List   Current Outpatient Medications   Medication Sig Dispense Refill     amLODIPine (NORVASC) 5 MG tablet TAKE 1 TABLET BY MOUTH EVERY DAY 90 tablet 3     docusate sodium (COLACE) 100 MG capsule Take 100 mg by mouth 2 (two) times a day as needed.        oxybutynin (DITROPAN) 5 MG tablet TAKE 1 TABLET BY MOUTH TWICE A  tablet 3     polyethylene glycol (MIRALAX) 17 gram packet Take 17 g by mouth daily as needed.       tamsulosin (FLOMAX) 0.4 mg cap Take 0.4 mg by mouth Daily after breakfast.       cyclobenzaprine (FLEXERIL) 5 MG tablet Take 1 tablet (5 mg total) by mouth every 8 (eight) hours as needed for muscle spasms. 20 tablet 0     No current facility-administered medications for this visit.      No Known Allergies  Social History     Tobacco Use     Smoking status: Never Smoker     Smokeless tobacco: Never Used   Substance Use Topics      Alcohol use: No     Alcohol/week: 0.0 - 1.0 standard drinks     Drug use: No     Patient Active Problem List   Diagnosis     Hypertension     Hyperactivity Of The Bladder     External hemorrhoids     Personal history of prostate cancer     Urinary urgency        Reviewed, reconciled and updated       Physical Exam   General Appearance: Appears well, rises easily from seated standing, breathing comfortably, blood pressure is excellent    /68 (Patient Site: Right Arm, Patient Position: Sitting, Cuff Size: Adult Regular)   Pulse 67   Temp 98.4  F (36.9  C) (Oral)   Wt 168 lb 8 oz (76.4 kg)   SpO2 97%   BMI 24.18 kg/m      Lungs clear  Heart regular rate and rhythm  Abdomen nontender  Remedies no edema     Additional Information   I spent 30 minutes on this encounter, including reviewing interval history since last visit, examining the patient, explaining and counseling the issues enumerated in the Assessment and Plan (patient given a copy), ordering indicated tests, ordering prescriptions       Denton Head MD

## 2021-06-25 NOTE — PROGRESS NOTES
RADIATION ONCOLOGY WEEKLY TREATMENT VISIT NOTE      Assessment / Impression       1. Malignant neoplasm of prostate (H)       Tolerating radiation therapy well.  All questions and concerns addressed.    Plan:     Continue radiation treatment as prescribed.  The patient continued to have challenge with his bladder urgency and frequency.  So far the penile clamp has been working well.  Patient has been referred to physical therapy for treatment recommendation.  We will continue monitor patient closely during the remaining course of the treatment.    Subjective:      HPI: Jorge Dupree is a 78 y.o. male with    1. Malignant neoplasm of prostate (H)         The following portions of the patient's history were reviewed and updated as appropriate: allergies, current medications, past family history, past medical history, past social history, past surgical history and problem list.    Assessment                  Body Site: Pelvis Site: Prostate  Stereotactic Radiosurgery: No  Concurrent Therapy: No  Today's Dose: 3600  Total Dose for Pelvis: 7920  Today's Fraction/Total Fraction Pelvis:   Drainage: 0: Absent  Diarrhea W/O Colostomy: 0: None  Constipation: 0: None  Proctitis: 0: None  Urinary frequency and/or urgency: 2: Increase more than two times normul but less than hourly  Urinary Retention: 0 : Normal  Urinary Incontinence: 2: 1-2 episodes of urinary incontinence in 24 hrs  Dysuria: 0: None  Nocturia: 5-6  Urine Color Change: 0: Normal                                            Sexuality Alteration                 Emotional Alteration Copin: Effective  Comfort Alteration KPS: 90% Can perform normal activity, minor signs of disease  Fatigue (ONS scale) : 6: Moderate Fatigue  Pain Location: denies   Nutrition Alteration Anorexia: 0: None  Nausea: 0: None  Vomitin: None  Skin Alteration Skin Sensation: 0: No problem  Skin Reaction: 0: None  AUA Assessment                                   Accompanied by       Objective:     Exam: Examination reviewed no significant changes.    Vitals:    03/19/19 0946   BP: 160/76   Pulse: 77   Temp: 98.1  F (36.7  C)   TempSrc: Oral   SpO2: 98%   Weight: 180 lb 11.2 oz (82 kg)       Wt Readings from Last 8 Encounters:   03/19/19 180 lb 11.2 oz (82 kg)   03/14/19 182 lb 12.8 oz (82.9 kg)   03/12/19 180 lb 12.8 oz (82 kg)   03/05/19 181 lb 11.2 oz (82.4 kg)   02/26/19 181 lb 3.2 oz (82.2 kg)   02/19/19 189 lb 4.8 oz (85.9 kg)   01/16/19 189 lb 11.2 oz (86 kg)   12/12/18 185 lb 9.6 oz (84.2 kg)       General: Alert and oriented, in no acute distress  Jorge has mild Erythema.  Aria chart and setup information reviewed    Lorenza Merino MD

## 2021-06-25 NOTE — PATIENT INSTRUCTIONS - HE
Increase amlodipine to 5 mg a day.    Goal blood pressure at least less than 140/85 for you.  Excellent blood pressure would be 130/70.  If blood pressure gets too low, less than 110/60, or lightheaded dizzy spells, you will need to go back to 2.5 mg a day amlodipine.    Maintain a low-salt diet and walk on a regular basis, which helps blood pressure.    Follow-up with me in April for blood pressure recheck and establish care visit.  You do not need to fast for that visit.    You have been given a referral to urology to discuss your spastic bladder issue.  You can also talk to your radiation oncologist about this.

## 2021-06-25 NOTE — PROGRESS NOTES
[facility] clinic Follow Up Note    Jorge Dupree   78 y.o. male    Date of Visit: 3/14/2019    No chief complaint on file.    Subjective  Jorge is a former Dr. Dahlia Álvarez and Dr. Middleton patient, patient thought he was going to establish care but they only made him a 20-minute appointment.    He wanted to discuss his blood pressure and bladder spasm issue.    He has a history of hypertension and has been on 2.5 mg of amlodipine for some time.    It is radiation oncology visit on March 12 his blood pressure was 153/81.  Patient states his blood pressures been up in the 160s, even a systolic of 199 at one time.    He is having increasing bladder spasm issues with, and he is quite anxious when he does his radiation therapy treatments, and likely that is affecting his blood pressure.    He does not check his blood pressure at home.    He denies lightheaded dizzy spells and there is been no edema.    August 2018 normal kidney labs.    He has a long history of bladder spasms and irritable bladder, even before the prostate cancer treatment.    Increasing PSA last summer, prostate cancer diagnosed, now getting radiation therapy.  December 2018 bone scan was negative.  He is having increasing bladder spasms with the radiation, as he has to hold his bladder with a full bladder for the radiation.    He is taking Flomax and Proscar currently.    He has been taking Ditropan daily.  Apparently his insurance did not cover the Detrol.  Patient is asking if there is anything better than Ditropan 5 mg XL.  No dysuria or hematuria.    Bowels are regular.  Past history of tubular adenoma April 2015 colonoscopy.    Apparently was a former smoker.    , lives at home.    August 2018 echo was normal.  No heart valve problems.    No new cough or increasing shortness of breath.    PMHx:    Past Medical History:   Diagnosis Date     Hypertension      Medical history non-contributory      Prostate cancer (H)      PSHx:     Past Surgical History:   Procedure Laterality Date     CHOLECYSTECTOMY       HERNIA REPAIR      1963 & 1974     Immunizations:   Immunization History   Administered Date(s) Administered     Pneumo Conj 13-V (2010&after) 05/14/2015     Pneumo Conj 7-V(before 2010) 07/30/2010     Pneumo Polysac 23-V 01/21/2016     Td, adult adsorbed, PF 09/28/2016     Td,adult,historic,unspecified 02/22/2005     ZOSTER, LIVE 08/13/2012       ROS A comprehensive review of systems was performed and was otherwise negative    Medications, allergies, and problem list were reviewed and updated    Exam  /64 (Patient Site: Right Arm, Patient Position: Sitting, Cuff Size: Adult Regular)   Pulse 60   Resp 16   Wt 182 lb 12.8 oz (82.9 kg)   BMI 25.86 kg/m    Pupils and irises equal and reactive.  No jaundice.  No JVD and no carotid bruits.  Lungs are clear.  Normal respiratory excursion.  Heart is regular and without murmur.  Abdomen is nontender no hepatospleno megaly.  Some very mild suprapubic tenderness to palpation.  No ankle edema.  No cervical or supra clavicular adenopathy.  No carotid bruits.    Assessment/Plan  1. Hypertension  Sub-adequately controlled with higher spiking blood pressures with anxiety and the strain of bladder spasms from his radiation therapy.    Increase amlodipine to 5 mg a day and follow-up with me next month.    Patient was warned about low blood pressure risk including lightheaded dizzy spells or even syncope.  I also discussed risk of leg edema.    If blood pressure does run too low, he can go back on his amlodipine, see patient instructions below.    Maintain a low-salt diet and walk on a regular basis.  - amLODIPine (NORVASC) 5 MG tablet; Take 1 tablet (5 mg total) by mouth daily.  Dispense: 90 tablet; Refill: 1    2. Hyperactivity Of The Bladder  I discussed options including Detrol and Myrbetriq, but unclear if his insurance would cover that.    He could try a higher dose of oxybutynin, but with  side effects including orthostasis and dry mouth.    I will have him speak with his urologist, he can also speak with his radiation oncologist about how they would wish to treat this.  I also discussed Valium suppositories for severe bladder spasms.  - Ambulatory referral to Urology    3. Prostate cancer (H)  Treatment per radiation oncology    Follow-up for blood pressure recheck and establish care visit next month.    Return in about 5 weeks (around 4/18/2019) for Recheck.   Patient Instructions   Increase amlodipine to 5 mg a day.    Goal blood pressure at least less than 140/85 for you.  Excellent blood pressure would be 130/70.  If blood pressure gets too low, less than 110/60, or lightheaded dizzy spells, you will need to go back to 2.5 mg a day amlodipine.    Maintain a low-salt diet and walk on a regular basis, which helps blood pressure.    Follow-up with me in April for blood pressure recheck and establish care visit.  You do not need to fast for that visit.    You have been given a referral to urology to discuss your spastic bladder issue.  You can also talk to your radiation oncologist about this.        Rajeev Galindo MD        Current Outpatient Medications   Medication Sig Dispense Refill     amLODIPine (NORVASC) 5 MG tablet Take 1 tablet (5 mg total) by mouth daily. 90 tablet 1     docusate sodium (COLACE) 100 MG capsule Take 100 mg by mouth 2 (two) times a day as needed for constipation.       finasteride (PROSCAR) 5 mg tablet Take 1 tablet (5 mg total) by mouth daily. 90 tablet 2     leuprolide, 6 month, 45 mg syringe Inject 45 mg under the skin every 6 (six) months. Given by [facility] on 1/07/2019       oxybutynin (DITROPAN XL) 5 MG ER tablet Take 1 tablet (5 mg total) by mouth daily. 60 tablet 2     tamsulosin (FLOMAX) 0.4 mg cap TAKE 2 CAPSULES(0.8 MG) BY MOUTH DAILY AFTER BREAKFAST 180 capsule 3     No current facility-administered medications for this visit.      No Known Allergies  Social  History     Tobacco Use     Smoking status: Never Smoker     Smokeless tobacco: Never Used   Substance Use Topics     Alcohol use: No     Alcohol/week: 0.0 - 0.6 oz     Drug use: No

## 2021-06-25 NOTE — TELEPHONE ENCOUNTER
The patient was informed of the clinician message and verbalized understanding. OV scheduled for 6/22.

## 2021-06-25 NOTE — TELEPHONE ENCOUNTER
Tell him to stop tamsulosin now    Check blood pressures with his home machine  If running less than 110 systolic, stop the amlodipine as well    Clinic visit in the next 1 to 2 weeks

## 2021-06-26 NOTE — PATIENT INSTRUCTIONS - HE
Here for follow-up since our last visit of December 1, 2020, continues to do well, and he is now been vaccinated for COVID-19.    The one issue he wanted to discuss today is his bladder    Bladder urgency and frequency in the context of history of prostate cancer and pelvic radiation therapy that completed in April 2019, currently on tamsulosin and immediate release oxybutynin     He told me today June 2, 2021 that he gets up about 3-4 times a night to urinate.  Once his bladder gets going, the stream is good.  But sometimes he feels like he might not empty completely.  He still has some leakage, and uses an absorptive garment to present prevent accidents.    He had a follow-up visit with radiation oncology April 14, 2021 and was assessed to be doing well.  I reminded Mr. Dupree to continue to do the pelvic floor exercises.    He thinks the tamsulosin and oxybutynin do benefit him symptomatically.  I suggested that he discuss this symptom at his next follow-up with urology and radiation oncology.    His PSA has remained near 0, check in April 2021.  I do not think there is any concern for infection.    DISEASE TREATED: Unfavorable intermediate risk prostate cancer, clinical stage T2 cN0 M0, [place] grade 4+3=7 and 3+4 =7 involving up to 90% of biopsy tissue bilaterally, and pretherapy PSA of 10.2.      TYPE OF RADIATION THERAPY ADMINISTERED:  7942 cGy in 45 treatments given from 2/19/2019-5/2/2019      Lab Results   Component Value Date    PSA 0.1 12/01/2020    PSA <0.1 05/26/2020    PSA <0.1 12/02/2019     Mild fatigue, which is noticed over the past several months   I do not find any localized symptoms or findings, so I will run routine blood work, since it has been a little over a year.  We will get comprehensive metabolic panel, blood cell counts, and check thyroid cascade.    Recovered from a slip and fall that was the reason for office visit February 3, 2021     External hemorrhoids, most likely the source of  the blood that he is seen in the toilet bowl, with history of small adenomatous colon polyps removed January 2015.       He told me the bleeding flares up from time to time.  He is using Colace stool softener and also the osmotic laxative MiraLAX.  I reminded him to adjust the dose of MiraLAX and/or Metamucil to achieve soft stools, but not trigger diarhea     It is possible that radiation therapy exacerbated tendency for hemorrhoids, possibly by forming scar tissue that could contribute to venous congestion    On examination in February 2020, he has an approximately 3 to 4 mm external hemorrhoid, with some clot inside.  I did not detect significant internal hemorrhoids.  He did have a colonoscopy performed January 2015 which disclosed 2 small adenomatous polyps that were removed.  These were less than 4 mm.  Also noted was long redundant colon.  Diverticulosis not noted.     Essential hypertension, well controlled,  On amlodipine up to 5 mg a day.    We will continue on present dose of amlodipine 5 mg a day  BP Readings from Last 3 Encounters:   06/02/21 108/68   04/14/21 120/58   02/03/21 134/78     Lab Results   Component Value Date    CREATININE 0.77 02/25/2020    BUN 16 02/25/2020     02/25/2020    K 4.2 02/25/2020     02/25/2020    CO2 29 02/25/2020     Resolved right hip and buttock pain, possibly gluteal tendinitis, which I believe is on the basis of gluteal muscles and tendons, also has muscular deconditioning of his quadriceps  He did 6 sessions of physical therapy, and I reminded him about the importance of continuing the home exercises.    Actinic keratoses, working with his dermatologist.      Current on vaccinations, already received seasonal flu shot.  He is current on his pneumococcal vaccines.    Would benefit from strength training to build lean muscle mass.     COVID-19,PF,Pfizer 02/16/2021, 03/09/2021

## 2021-06-28 NOTE — PROGRESS NOTES
Progress Notes by Ted Salas, PT Student at 3/10/2020  8:30 AM     Author: Ted Salas PT Student Service: -- Author Type: PT Student    Filed: 3/10/2020  9:17 AM Encounter Date: 3/10/2020 Status: Attested    : Ted Salas PT Student (PT Student) Cosigner: Zack Isaac, PT at 3/10/2020  9:28 AM    Attestation signed by Zack Isaac, PT at 3/10/2020  9:28 AM    I attest that I was present in the room, making skilled assessments and directing the service provided today.  I was responsible for the assessment and treatment of the patient.  During this time, I was not engaged in treating another patient or doing other tasks.  Zack Isaac, RIKI                 [facility] Daily Progress     Patient Name: Jorge Dupree  Date: 3/10/2020  Visit #: 3/10  Authorization dates:  3/2/20-5/7/20  Referral Diagnosis: Gluteal tendinitis of right buttock  Referring provider: Denton Head MD  Visit Diagnosis:     ICD-10-CM    1. Gluteal tendinitis of right buttock  M76.01    2. Right leg pain  M79.604        Precautions / Restrictions : prostate cancer       Assessment:     Response to Intervention:  Tolerated well with decreased pain and improved gait pattern post treatment. Patient stated his left leg felt mildly better following soft tissue massage. Patient needed verbal and tactile cues for the seated nerve glide in order to keep his trunk upright and follow the proper sequence.     Symptoms are consistent with:  Medical diagnosis with sciatic nerve involvement.  Patient is appropriate to continue with skilled physical therapy intervention, as indicated by initial plan of care.    Goal Status:  Pt. will demonstrate/verbalize independence in self-management of condition in : 6 weeks  Pt. will be independent with home exercise program in : 6 weeks  Pt. will have improved quality of sleep: with less pain;waking less times/night;in 6 weeks  Pt. will be able to walk : 10  "minutes;with less difficulty;with less pain;for household mobility;in 6 weeks  Patient will stand : 30 minutes;with less pain;with less difficultty;for home chores;in 6 weeks  Patient will transfer: sit/stand;supine/sit;for in/out of bed;for in/out of chair;with no pain;with less difficulty;in 6 weeks    No data recorded  Other functional progress:  Patient with decreased pain today.     Plan / Patient Education:     Continue with initial plan of care.  Progress with home program as tolerated.  Continue teasing out sciatica v stenosis  Plan for next time: Progress with increased manual therapy of left lumbar muscles and piriformis.     Subjective:     Patient presents with pain and numbness down the right leg. Pain even with sitting as well. Patient is compliant with exercise program. He does them twice a day.     Pain Rating:  Resting  6/10  Activity:  6/10    Response to last treatment: felt pretty good  HEP- Frequency: 0x/day, Questions or difficulties:  Not doing exercises cause of soreness.      Objective:     Walks with moderate forward trunk lean  Soft tissue tightness over the left piriformis region.       Treatment Today     Exercises:  Exercise #1: NuStep 3 mins at resistance level 5  Comment #1: not today.  Exercise #2: supine LE nerve glide- not today  Comment #2: 10  Exercise #3:    Comment #3:    Exercise #4: seated nerve glide/ LAQ  Comment #4: 10 bilateral  Exercise #5: LTR cues to keep pain free  Comment #5: 10  Exercise #6: seated marching  Comment #6: 10 bilateral  Exercise #7: single knee to chest  Comment #7: 30\" each x 2 bilateral          TREATMENT MINUTES COMMENTS   Evaluation     Self-care/ Home management     Manual therapy 10 Manual stretching of left hamstrings. STM to the left piriformis musculature with patient in right sidelying.    Neuromuscular Re-education     Therapeutic Activity     Therapeutic Exercises 20 See exercise flow-sheet for details.    Gait training   "   Modality__________________                Total 30    Blank areas are intentional and mean the treatment did not include these items.       Ted Salas, PT Student  3/10/2020

## 2021-06-28 NOTE — PROGRESS NOTES
Progress Notes by Zack Isaac, PT at 3/2/2020  9:15 AM     Author: Zack Isaac PT Service: -- Author Type: Physical Therapist    Filed: 3/3/2020  3:47 PM Encounter Date: 3/2/2020 Status: Attested    : Zack Isaac PT (Physical Therapist) Cosigner: Denton Head MD at 3/3/2020  4:05 PM    Attestation signed by Denton Head MD at 3/3/2020  4:05 PM    I agree Follow therapist's recommendation                    [facility] Certification Request    March 3, 2020      Patient: Jorge Dupree  MR Number: [id number]  YOB: 1940  Date of Visit: 3/2/2020      Dear Denton Almendarez MD:    Thank you for this referral.   We are seeing Jorge Dupree in Physical Therapy for Gluteal tendinitis of right buttock.    Medicare and/or Medicaid requires physician review and approval of the treatment plan. Please review the plan of care and verify that you agree with the therapy plan of care by co-signing this note.      Plan of Care  Authorization / Certification Start Date: 03/02/20  Authorization / Certification End Date: 05/07/20  Authorization / Certification Number of Visits: 10  Communication with: Referral Source  Patient Related Instruction: Nature of Condition;Basis of treatment;Expected outcome;Body mechanics;Treatment plan and rationale;Posture;Self Care instruction;Precautions;Next steps  Times per Week: 2-1  Number of Weeks: 8  Number of Visits: 10  Discharge Planning: to include HEP and self management   Precautions / Restrictions : prostate cancer  Therapeutic Exercise: ROM;Stretching;Strengthening  Neuromuscular Reeducation: posture;core  Manual Therapy: soft tissue mobilization;myofascial release;joint mobilization      Goals:  Pt. will demonstrate/verbalize independence in self-management of condition in : 6 weeks  Pt. will be independent with home exercise program in : 6 weeks  Pt. will have improved quality of sleep: with less pain;waking less  times/night;in 6 weeks  Pt. will be able to walk : 10 minutes;with less difficulty;with less pain;for household mobility;in 6 weeks  Patient will stand : 30 minutes;with less pain;with less difficultty;for home chores;in 6 weeks  Patient will transfer: sit/stand;supine/sit;for in/out of bed;for in/out of chair;with no pain;with less difficulty;in 6 weeks    No data recorded      If you have any questions or concerns, please don't hesitate to call.    Sincerely,      Zack Isaac, PT      Physician:    For Medicare/MA patients:      Physician recommendation:     ___ Follow therapist's recommendation        ___ Modify therapy      *Physician co-signature indicates they certify the need for these services furnished within this plan and while under their care.         [facility]   [place]-Pel[facility] Initial Evaluation    Patient Name: Jorge Dupree  Date of evaluation: 3/2/2020  Referral Diagnosis: Gluteal tendinitis of right buttock  Referring provider: Denton Head MD  Visit Diagnosis:     ICD-10-CM    1. Gluteal tendinitis of right buttock M76.01    2. Right leg pain M79.604        Precautions / Restrictions : prostate cancer       Assessment:      Impairments in  pain, posture, ROM, joint mobility, strength  Patient's signs and symptoms are consistent with medical diagnosis with sciatic nerve involvement.  Patient responded well to manual therapy and initiation of HEP.  Prognosis to achieve goals is  fair   Pt. is appropriate for skilled PT intervention as outlined in the Plan of Care (POC).    Goals:  Pt. will demonstrate/verbalize independence in self-management of condition in : 6 weeks  Pt. will be independent with home exercise program in : 6 weeks  Pt. will have improved quality of sleep: with less pain;waking less times/night;in 6 weeks  Pt. will be able to walk : 10 minutes;with less difficulty;with less pain;for household mobility;in 6 weeks  Patient will stand : 30 minutes;with less  pain;with less difficultty;for home chores;in 6 weeks  Patient will transfer: sit/stand;supine/sit;for in/out of bed;for in/out of chair;with no pain;with less difficulty;in 6 weeks    No data recorded    Barriers to Learning or Achieving Goals:  No Barriers.    Patient's expectations/goals are realistic.    A shared decision making model was used.  The patient's values and choices were respected.  The following represents what was discussed and decided upon by the physical therapist and the patient.          Plan / Patient Instructions:        Plan of Care:   Authorization / Certification Start Date: 03/02/20  Authorization / Certification End Date: 05/07/20  Authorization / Certification Number of Visits: 10  Communication with: Referral Source  Patient Related Instruction: Nature of Condition;Basis of treatment;Expected outcome;Body mechanics;Treatment plan and rationale;Posture;Self Care instruction;Precautions;Next steps  Times per Week: 2-1  Number of Weeks: 8  Number of Visits: 10  Discharge Planning: to include HEP and self management   Precautions / Restrictions : prostate cancer  Therapeutic Exercise: ROM;Stretching;Strengthening  Neuromuscular Reeducation: posture;core  Manual Therapy: soft tissue mobilization;myofascial release;joint mobilization      POC and pathology of condition were reviewed with patient.  Pt. is in agreement with the Plan of Care  A Home Exercise Program (HEP) was initiated today.  Pt. was instructed in exercises by PT and patient was given a handout with detailed instructions.    Plan for next visit: review HEP, continue manual therapy and progress as tolerated.     Subjective:           History of Present Illness:    Jorge is a 79 y.o. male who presents to therapy today with complaints of right lateral hip pain that radiates to his foot. Date of onset:  February 2020. Onset was gradual. Symptoms are constant. He denies history of similar symptoms. He describes their previous level  of function as not limited    Pain Ratin  Pain rating at best: 5  Pain rating at worst: 9  Pain description: aching, pain, sharp, shooting, soreness and tingling    Functional limitations are described as occurring with:   sleeping  standing    transitional movements sit to stand and sit to supine  walking             Objective:      Note: Items left blank indicates the item was not performed or not indicated at the time of the evaluation.    Patient Outcome Measures :    Modified Oswestry Low Back Pain Disablity Questionnaire  in %: 60     Scores range from 0-100%, where a score of 0% represents minimal pain and maximal function. The minimal clinically important difference is a score reduction of 12%.    Examination  1. Gluteal tendinitis of right buttock     2. Right leg pain       Precautions / Restrictions : prostate cancer     Involved side: Right  Posture Observation:      Cervical:  Moderate forward head  Shoulder/Thoracic complex: Moderate bilateral scapular protraction   Moderately increased upper thoracic kyphosis  Lumbopelvic complex: flexed posture    Lumbar ROM:    Date: 20      *Indicate scale AROM AROM AROM   Lumbar Flexion min     Lumbar Extension mod      Right Left Right Left Right Left   Lumbar Sidebending mod mod       Lumbar Rotation mod mod       Thoracic Flexion      Thoracic Extension      Thoracic Sidebending         Thoracic Rotation           Lower Extremity Strength:    Date: 20      LE strength/5 Right Left Right Left Right Left   Hip Flexion (L1-3) 4- 4       Hip Extension (L5-S1) 4 4       Hip Abduction (L4-5) 4 4       Hip Adduction (L2-3) 4 4       Hip External Rotation         Hip Internal Rotation         Knee Extension (L3-4) 4 5       Knee Flexion 4 5       Ankle Dorsiflexion (L4-5) 4 5       Great Toe Extension (L5)         Ankle Plantar flexion (S1)         Abdominals        Sensation    NT       Reflex Testing:  NT  Lumbar Dermatomes Right Left UE Reflexes Right  "Left   Iliac Crest and Groin (L1)   Biceps (C5-6)     Anterior Medial Thigh (L2)   Brachioradialis (C5-6)     Anterior Thigh, Medial Epicondyle Femur (L3)   Triceps (C7-8)     Lateral Thigh, Anterior Knee, Medial Leg/Malleolus (L4)   Hoffmanns test     Lateral Leg, Dorsal Foot (L5)   LE Reflexes     Lateral Foot (S1)   Patellar (L3-4)     Posterior Leg (S2)   Achilles (S1-2)     Other:   Babinski Response       Palpation:  Tenderness and restriction right: glut max, piriformis, quad, hamstring, gastroc,     Flexibility:  Limitation of hip flexors, quads, hamstrings,     Lumbar Special Tests:      Lumbar Special Tests Right Left SI Tests Right  Left   Quadrant test   SI Compression     Straight leg raise +  SI Distraction     Crossover response   POSH Test     Slump   Sacral Thrust     Sit-up test  FADIR     Trunk extensor endurance test  Resisted Abduction     Prone instability test  Other:     Pubic shotgun - Other:       Repeated Motion Testing:  NT    Passive Mobility - Joint Integrity:  Not tested    LE Screen:  Hip PROM:  Limited hip extension .  Hip Scour:  NT.    Treatment Today     Exercises:  Exercise #1: manual stretching hip flexor, quad, piriformis  Comment #1: 30\" x 2 bilateral  Exercise #2: supine LE nerve glide  Comment #2: 10  Exercise #3: piriformis stretch  Comment #3: 30\" x 2   Exercise #4: seated nerve glide  Comment #4: 10  Exercise #5: LTR?         Manual therapy  MFR right: glut max, piriformis, hamstrings, quad, gastroc, hip adductor cuca and longus    TREATMENT MINUTES COMMENTS   Evaluation 20    Self-care/ Home management     Manual therapy 30    Neuromuscular Re-education     Therapeutic Activity     Therapeutic Exercises 10    Gait training     Modality__________________                Total 60    Blank areas are intentional and mean the treatment did not include these items.     PT Evaluation Code: (Please list factors)  Patient History/Comorbidities:   Patient Active Problem List "   Diagnosis   ? Hypertension   ? Hyperactivity Of The Bladder   ? Malignant neoplasm of prostate (H)   ? External hemorrhoids   ? Gluteal tendinitis of right buttock      Past Medical History:   Diagnosis Date   ? Hypertension    ? Medical history non-contributory    ? Prostate cancer (H)       Examination: as above  Clinical Presentation: stable  Clinical Decision Making: low complexity    Patient History/  Comorbidities Examination  (body structures and functions, activity limitations, and/or participation restrictions) Clinical Presentation Clinical Decision Making (Complexity)   No documented Comorbidities or personal factors 1-2 Elements Stable and/or uncomplicated Low   1-2 documented comorbidities or personal factor 3 Elements Evolving clinical presentation with changing characteristics Moderate   3-4 documented comorbidities or personal factors 4 or more Unstable and unpredictable High               Zack Isaac, PT  3/2/2020  9:30 AM

## 2021-07-03 NOTE — ADDENDUM NOTE
Addendum Note by Cami Machado RN at 3/5/2019  9:15 AM     Author: Cami Machado RN Service: -- Author Type: Registered Nurse    Filed: 3/5/2019 12:09 PM Encounter Date: 3/5/2019 Status: Signed    : Cami Machado RN (Registered Nurse)    Addended by: CAMI MACHADO on: 3/5/2019 12:09 PM        Modules accepted: Orders

## 2021-07-04 NOTE — ADDENDUM NOTE
Addendum Note by Lorenza Kohli MD at 4/14/2021  9:45 AM     Author: Lorenza Kohli MD Service: -- Author Type: Physician    Filed: 6/2/2021  1:32 PM Encounter Date: 4/14/2021 Status: Signed    : Lorenza Kohli MD (Physician)    Addended by: LORENZA KOHLI on: 6/2/2021 01:32 PM        Modules accepted: Level of Service

## 2021-07-06 VITALS
WEIGHT: 168.5 LBS | SYSTOLIC BLOOD PRESSURE: 108 MMHG | TEMPERATURE: 98.4 F | OXYGEN SATURATION: 97 % | BODY MASS INDEX: 24.18 KG/M2 | HEART RATE: 67 BPM | DIASTOLIC BLOOD PRESSURE: 68 MMHG

## 2021-08-03 PROBLEM — C61 MALIGNANT NEOPLASM OF PROSTATE (H): Status: RESOLVED | Noted: 2019-01-16 | Resolved: 2020-05-26

## 2021-09-05 ENCOUNTER — HEALTH MAINTENANCE LETTER (OUTPATIENT)
Age: 81
End: 2021-09-05

## 2021-10-08 ENCOUNTER — TELEPHONE (OUTPATIENT)
Dept: INTERNAL MEDICINE | Facility: CLINIC | Age: 81
End: 2021-10-08

## 2021-10-08 NOTE — TELEPHONE ENCOUNTER
Dr. Head will not be in next week.  LMTCB to r/s Jorge's 3 mo f/u on the 13th.      HUBER Stafford

## 2021-10-11 ENCOUNTER — IMMUNIZATION (OUTPATIENT)
Dept: NURSING | Facility: CLINIC | Age: 81
End: 2021-10-11
Payer: MEDICARE

## 2021-10-11 PROCEDURE — 0004A PR COVID VAC PFIZER DIL RECON 30 MCG/0.3 ML IM: CPT

## 2021-10-11 PROCEDURE — 91300 PR COVID VAC PFIZER DIL RECON 30 MCG/0.3 ML IM: CPT

## 2021-10-14 ENCOUNTER — TRANSFERRED RECORDS (OUTPATIENT)
Dept: HEALTH INFORMATION MANAGEMENT | Facility: CLINIC | Age: 81
End: 2021-10-14

## 2021-12-07 ENCOUNTER — OFFICE VISIT (OUTPATIENT)
Dept: INTERNAL MEDICINE | Facility: CLINIC | Age: 81
End: 2021-12-07
Payer: MEDICARE

## 2021-12-07 VITALS
SYSTOLIC BLOOD PRESSURE: 138 MMHG | HEART RATE: 66 BPM | WEIGHT: 166.4 LBS | BODY MASS INDEX: 23.82 KG/M2 | DIASTOLIC BLOOD PRESSURE: 70 MMHG | TEMPERATURE: 97.8 F | OXYGEN SATURATION: 98 % | HEIGHT: 70 IN

## 2021-12-07 DIAGNOSIS — I10 ESSENTIAL HYPERTENSION, BENIGN: ICD-10-CM

## 2021-12-07 DIAGNOSIS — R53.82 CHRONIC FATIGUE: ICD-10-CM

## 2021-12-07 DIAGNOSIS — N32.81 URGENCY-FREQUENCY SYNDROME: ICD-10-CM

## 2021-12-07 DIAGNOSIS — Z00.00 ROUTINE GENERAL MEDICAL EXAMINATION AT A HEALTH CARE FACILITY: Primary | ICD-10-CM

## 2021-12-07 LAB
ALBUMIN SERPL-MCNC: 4.1 G/DL (ref 3.5–5)
ALP SERPL-CCNC: 127 U/L (ref 45–120)
ALT SERPL W P-5'-P-CCNC: 13 U/L (ref 0–45)
ANION GAP SERPL CALCULATED.3IONS-SCNC: 11 MMOL/L (ref 5–18)
AST SERPL W P-5'-P-CCNC: 18 U/L (ref 0–40)
BILIRUB SERPL-MCNC: 0.7 MG/DL (ref 0–1)
BUN SERPL-MCNC: 13 MG/DL (ref 8–28)
CALCIUM SERPL-MCNC: 9.1 MG/DL (ref 8.5–10.5)
CHLORIDE BLD-SCNC: 106 MMOL/L (ref 98–107)
CHOLEST SERPL-MCNC: 173 MG/DL
CO2 SERPL-SCNC: 24 MMOL/L (ref 22–31)
CREAT SERPL-MCNC: 0.77 MG/DL (ref 0.7–1.3)
ERYTHROCYTE [DISTWIDTH] IN BLOOD BY AUTOMATED COUNT: 13.6 % (ref 10–15)
FASTING STATUS PATIENT QL REPORTED: YES
GFR SERPL CREATININE-BSD FRML MDRD: 85 ML/MIN/1.73M2
GLUCOSE BLD-MCNC: 101 MG/DL (ref 70–125)
HCT VFR BLD AUTO: 42.5 % (ref 40–53)
HDLC SERPL-MCNC: 75 MG/DL
HGB BLD-MCNC: 13.6 G/DL (ref 13.3–17.7)
LDLC SERPL CALC-MCNC: 88 MG/DL
MCH RBC QN AUTO: 29.2 PG (ref 26.5–33)
MCHC RBC AUTO-ENTMCNC: 32 G/DL (ref 31.5–36.5)
MCV RBC AUTO: 91 FL (ref 78–100)
PLATELET # BLD AUTO: 248 10E3/UL (ref 150–450)
POTASSIUM BLD-SCNC: 4 MMOL/L (ref 3.5–5)
PROT SERPL-MCNC: 6.9 G/DL (ref 6–8)
RBC # BLD AUTO: 4.66 10E6/UL (ref 4.4–5.9)
SODIUM SERPL-SCNC: 141 MMOL/L (ref 136–145)
TRIGL SERPL-MCNC: 50 MG/DL
TSH SERPL DL<=0.005 MIU/L-ACNC: 1.01 UIU/ML (ref 0.3–5)
WBC # BLD AUTO: 4 10E3/UL (ref 4–11)

## 2021-12-07 PROCEDURE — 80053 COMPREHEN METABOLIC PANEL: CPT | Performed by: INTERNAL MEDICINE

## 2021-12-07 PROCEDURE — G0439 PPPS, SUBSEQ VISIT: HCPCS | Performed by: INTERNAL MEDICINE

## 2021-12-07 PROCEDURE — 84443 ASSAY THYROID STIM HORMONE: CPT | Performed by: INTERNAL MEDICINE

## 2021-12-07 PROCEDURE — 36415 COLL VENOUS BLD VENIPUNCTURE: CPT | Performed by: INTERNAL MEDICINE

## 2021-12-07 PROCEDURE — 85027 COMPLETE CBC AUTOMATED: CPT | Performed by: INTERNAL MEDICINE

## 2021-12-07 PROCEDURE — 80061 LIPID PANEL: CPT | Performed by: INTERNAL MEDICINE

## 2021-12-07 RX ORDER — OXYBUTYNIN CHLORIDE 5 MG/1
5 TABLET ORAL DAILY
COMMUNITY
Start: 2021-10-05 | End: 2022-05-02

## 2021-12-07 ASSESSMENT — ACTIVITIES OF DAILY LIVING (ADL): CURRENT_FUNCTION: NO ASSISTANCE NEEDED

## 2021-12-07 ASSESSMENT — MIFFLIN-ST. JEOR: SCORE: 1470.01

## 2021-12-07 NOTE — PROGRESS NOTES
"SUBJECTIVE:   Jorge Dupree is a 81 year old male who presents for Preventive Visit.      Patient has been advised of split billing requirements and indicates understanding: Yes   Are you in the first 12 months of your Medicare coverage?  No    Healthy Habits:     In general, how would you rate your overall health?  Fair    Frequency of exercise:  2-3 days/week    Duration of exercise:  30-45 minutes    Do you usually eat at least 4 servings of fruit and vegetables a day, include whole grains    & fiber and avoid regularly eating high fat or \"junk\" foods?  Yes    Taking medications regularly:  Yes    Barriers to taking medications:  None    Medication side effects:  None    Ability to successfully perform activities of daily living:  No assistance needed    Home Safety:  No safety concerns identified    Hearing Impairment:  No hearing concerns    In the past 6 months, have you been bothered by leaking of urine? Yes    In general, how would you rate your overall mental or emotional health?  Good      PHQ-2 Total Score: 0    Additional concerns today:  Yes    Do you feel safe in your environment? Yes    Have you ever done Advance Care Planning? (For example, a Health Directive, POLST, or a discussion with a medical provider or your loved ones about your wishes): Yes, patient states has an Advance Care Planning document and will bring a copy to the clinic.       Fall risk  Fallen 2 or more times in the past year?: No  Any fall with injury in the past year?: No    Cognitive Screening   1) Repeat 3 items (Leader, Season, Table)    2) Clock draw: NORMAL  3) 3 item recall: Recalls 2 objects   Results: NORMAL clock, 1-2 items recalled: COGNITIVE IMPAIRMENT LESS LIKELY    Mini-CogTM Copyright REKHA Olmstead. Licensed by the author for use in [facility]; reprinted with permission (pauline@.[facility]). All rights reserved.      Do you have sleep apnea, excessive snoring or daytime drowsiness?: yes    Reviewed and updated as " "needed this visit by clinical staff  Tobacco  Allergies  Meds             Reviewed and updated as needed this visit by Provider    Meds            Social History     Tobacco Use     Smoking status: Never Smoker     Smokeless tobacco: Never Used   Substance Use Topics     Alcohol use: No     Alcohol/week: 0.0 - 1.0 standard drinks     Comment: SELDOM TO SMALL         Alcohol Use 12/7/2021   Prescreen: >3 drinks/day or >7 drinks/week? No   Prescreen: >3 drinks/day or >7 drinks/week? -       Current providers sharing in care for this patient include:   Patient Care Team:  Denton Head MD as PCP - General  Lorenza Merino MD as MD (Hematology & Oncology)  Lorenza Merino MD as Assigned Cancer Care Provider  Denton Head MD as Assigned PCP    The following health maintenance items are reviewed in Epic and correct as of today:  Health Maintenance Due   Topic Date Due     ANNUAL REVIEW OF HM ORDERS  Never done     ZOSTER IMMUNIZATION (2 of 3) 10/08/2012     FALL RISK ASSESSMENT  05/26/2021     Review of Systems  Constitutional, HEENT, cardiovascular, pulmonary, gi and gu systems are negative, except as otherwise noted.    OBJECTIVE:   /70 (BP Location: Right arm, Patient Position: Sitting, Cuff Size: Adult Large)   Pulse 66   Temp 97.8  F (36.6  C) (Oral)   Ht 1.784 m (5' 10.25\")   Wt 75.5 kg (166 lb 6.4 oz)   SpO2 98%   BMI 23.71 kg/m   Estimated body mass index is 23.71 kg/m  as calculated from the following:    Height as of this encounter: 1.784 m (5' 10.25\").    Weight as of this encounter: 75.5 kg (166 lb 6.4 oz).  Physical Exam    General: Alert, in no distress  Skin: No significant lesion seen.  Eyes/nose/throat: Eyes without scleral icterus, eye movements normal, pupils equal and reactive, oropharynx clear,  + Bilateral earwax, but heavier on the left side  MSK: Neck with good ROM  Lymphatic: Neck without adenopathy or masses  Endocrine: Thyroid with no nodules to palpation  Pulm: Lungs clear to " auscultation bilaterally  Cardiac: Heart with regular rate and rhythm, no murmur or gallop  GI: Abdomen soft, nontender. No palpable enlargement of liver or spleen  MSK: Extremities no tenderness or edema  + There is a small synovial cyst protruding anteriorly from his left shoulder  Neuro: Moves all extremities, without focal weakness  Psych: Alert, normal mental status. Normal affect and speech    ASSESSMENT / PLAN:     Annual wellness visit, overall doing well since her last meeting of June 22, 2021.  His weight has remained stable, no longer losing.  I think he can simply maintain his current weight.  He did try stopping tamsulosin, but then restarted, but is noted no symptomatic benefit from his bladder, so I told her that he might as well stop the tamsulosin again.    He find the oxybutynin more helpful for relieving urinary frequency.  He gets up maybe 2 or 3 times a night, and so his sleep gets a bit disrupted, and I think that is probably contributing to fatigue.  He is using a depends absorptive undergarment.    Vaccines are up-to-date.    We will have him swing by the laboratory this morning for comprehensive metabolic panel, blood cell counts, check thyroid status, lipid profile.    Bladder urgency and frequency, leakage, in the context of history of prostate cancer and pelvic radiation therapy that completed in April 2019, immediate release oxybutynin  Dr Alejandro 10-  PSA around 0.1    Tamsulosin stopped 6/9/2021 after syncopal spell, then restarted  But he told me he cannot perceive any symptomatic benefit    He had a follow-up visit with radiation oncology [facility] 14, 2021 and was assessed to be doing well.  I reminded Mr. Dupree to continue to do the pelvic floor exercises.     oxybutynin does benefit him symptomatically.  His PSA has remained near 0, check in April 2021.  I do not think there is any concern for infection.     DISEASE TREATED: Unfavorable intermediate risk prostate cancer,  clinical stage T2 cN0 M0, [place] grade 4+3=7 and 3+4 =7 involving up to 90% of biopsy tissue bilaterally, and pretherapy PSA of 10.2.      TYPE OF RADIATION THERAPY ADMINISTERED:  7942 cGy in 45 treatments given from 2/19/2019-5/2/2019    Mild normocytic anemia, unclear cause  6-2-2021  Hemoglobin 14.0 - 18.0 g/dL 12.8 Low      No recurrence of syncopal spell 6/7/2021   Probably this fainting spell was because of low blood pressure. I advised him at that time to stop tamsulosin which he did. Bladder function is about the same.     Weight loss, stablized  His PSA has remained close to 0, so I do not think we need to worry about any recurrence of prostate cancer.     Wt Readings from Last 5 Encounters:   12/07/21 75.5 kg (166 lb 6.4 oz)   06/22/21 74.7 kg (164 lb 11.2 oz)   06/02/21 76.4 kg (168 lb 8 oz)   04/14/21 77.4 kg (170 lb 9.6 oz)   02/03/21 76.7 kg (169 lb)     Mild degree of anemia with hemoglobin in the upper 12 g range, but I would not expect that to be producing the symptoms    I suggested frequent small meals, okay to enjoy more calorie dense foods like avocados, nuts, healthy proteins.    Essential hypertension, on amlodipine 2.5 mg  I told him to keep a notebook of blood pressures measured several times a week at different times the day     BP Readings from Last 6 Encounters:   12/07/21 138/70   06/22/21 114/52   06/02/21 108/68   04/14/21 120/58   02/03/21 134/78   12/01/20 120/60     External hemorrhoids, comes and goes, with history of small adenomatous colon polyps removed January 2015; possibly hemorrhoids relate to vascular congestion as a consequence of pelvic radiation for prostate cancer     He told me the bleeding flares up from time to time.  He is using Colace stool softener and also the osmotic laxative MiraLAX.  I reminded him to adjust the dose of MiraLAX and/or Metamucil to achieve soft stools, but not trigger diarhea      It is possible that radiation therapy exacerbated tendency for  hemorrhoids, possibly by forming scar tissue that could contribute to venous congestion     February 2020, he had approximately 3 to 4 mm external hemorrhoid, with some clot inside.  I did not detect significant internal hemorrhoids.  He did have a colonoscopy performed January 2015 which disclosed 2 small adenomatous polyps that were removed.  These were less than 4 mm.  Also noted was long redundant colon. Diverticulosis not noted.    Bilateral earwax, but heavier on the left side  I recommended that he purchase over-the-counter wax dissolving eardrop, example brand Debrox or Murine, follow the package directions  Recommend that he do that perhaps once a week to keep the ears clear    Synovial cyst left shoulder, which protrudes anteriorly, and fluctuates in size.  I told that the cyst may wax and wane based on the amount of fluid inside the joint which can get resorbed, and fluid might increase if the joint is irritated for some reason.  This is benign, he still has a good range of motion, and nothing needs to be done     Resolved right hip and buttock pain, possibly gluteal tendinitis, which I believe is on the basis of gluteal muscles and tendons, also has muscular deconditioning of his quadriceps  He did 6 sessions of physical therapy, and I reminded him about the importance of continuing the home exercises.     Actinic keratoses, working with his dermatologist.       Received third dose of Pfizer COVID-19 vaccine 10/11/2021  Already received seasonal flu shot, has had both pneumococcal vaccines, up-to-date on tetanus.    Recomment shingles vaccine would be optional, and he could get that done at a community pharmacy, as it is paid for under the Medicare prescription drug benefit    Immunization History   Administered Date(s) Administered     COVID-19,PF,Pfizer (12+ Yrs) 02/16/2021, 03/09/2021, 10/11/2021     Influenza (H1N1) 03/15/2010     Influenza (High Dose) 3 valent vaccine 10/07/2017, 08/27/2018,  "09/17/2019, 09/06/2021     Influenza (IIV3) PF 10/01/2004, 10/01/2006, 10/01/2007, 08/15/2009, 08/31/2009, 10/23/2010, 10/15/2011, 08/25/2012, 10/01/2013, 08/22/2014     Influenza Vaccine, 6+MO IM (QUADRIVALENT W/PRESERVATIVES) 08/28/2015     Pneumo Conj 13-V (2010&after) 05/14/2015     Pneumococcal (PCV 7) 07/30/2010     Pneumococcal 23 valent 01/21/2016     TD (ADULT, 7+) 02/22/2005, 09/28/2016     Td (Adult), Adsorbed 06/16/1997     Zoster vaccine, live 08/13/2012       Patient has been advised of split billing requirements and indicates understanding: Yes  COUNSELING:  Reviewed preventive health counseling, as reflected in patient instructions       Healthy diet/nutrition    Estimated body mass index is 23.71 kg/m  as calculated from the following:    Height as of this encounter: 1.784 m (5' 10.25\").    Weight as of this encounter: 75.5 kg (166 lb 6.4 oz).        He reports that he has never smoked. He has never used smokeless tobacco.      Appropriate preventive services were discussed with this patient, including applicable screening as appropriate for cardiovascular disease, diabetes, osteopenia/osteoporosis, and glaucoma.  As appropriate for age/gender, discussed screening for colorectal cancer, prostate cancer, breast cancer, and cervical cancer. Checklist reviewing preventive services available has been given to the patient.    Reviewed patients plan of care and provided an AVS. The Basic Care Plan (routine screening as documented in Health Maintenance) for Jorge meets the Care Plan requirement. This Care Plan has been established and reviewed with the Patient.        JUNITO TREVINO MD  [facility]    Identified Health Risks:  "

## 2021-12-07 NOTE — PATIENT INSTRUCTIONS
Annual wellness visit, overall doing well since her last meeting of June 22, 2021.  His weight has remained stable, no longer losing.  I think he can simply maintain his current weight.  He did try stopping tamsulosin, but then restarted, but is noted no symptomatic benefit from his bladder, so I told her that he might as well stop the tamsulosin again.    He find the oxybutynin more helpful for relieving urinary frequency.  He gets up maybe 2 or 3 times a night, and so his sleep gets a bit disrupted, and I think that is probably contributing to fatigue.  He is using a depends absorptive undergarment.    Vaccines are up-to-date.    We will have him swing by the laboratory this morning for comprehensive metabolic panel, blood cell counts, check thyroid status, lipid profile.    Bladder urgency and frequency, leakage, in the context of history of prostate cancer and pelvic radiation therapy that completed in April 2019, immediate release oxybutynin  Dr Alejandro 10-  PSA around 0.1    Tamsulosin stopped 6/9/2021 after syncopal spell, then restarted  But he told me he cannot perceive any symptomatic benefit    He had a follow-up visit with radiation oncology [facility] 14, 2021 and was assessed to be doing well.  I reminded Mr. Dupree to continue to do the pelvic floor exercises.     oxybutynin does benefit him symptomatically.  His PSA has remained near 0, check in April 2021.  I do not think there is any concern for infection.     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      TYPE OF RADIATION THERAPY ADMINISTERED:  7942 cGy in 45 treatments given from 2/19/2019-5/2/2019    Mild normocytic anemia, unclear cause  6-2-2021  Hemoglobin 14.0 - 18.0 g/dL 12.8 Low      No recurrence of syncopal spell 6/7/2021   Probably this fainting spell was because of low blood  pressure. I advised him at that time to stop tamsulosin which he did. Bladder function is about the same.     Weight loss, stablized  His PSA has remained close to 0, so I do not think we need to worry about any recurrence of prostate cancer.     Wt Readings from Last 5 Encounters:   12/07/21 75.5 kg (166 lb 6.4 oz)   06/22/21 74.7 kg (164 lb 11.2 oz)   06/02/21 76.4 kg (168 lb 8 oz)   04/14/21 77.4 kg (170 lb 9.6 oz)   02/03/21 76.7 kg (169 lb)     Mild degree of anemia with hemoglobin in the upper 12 g range, but I would not expect that to be producing the symptoms    I suggested frequent small meals, okay to enjoy more calorie dense foods like avocados, nuts, healthy proteins.    Essential hypertension, on amlodipine 2.5 mg  I told him to keep a notebook of blood pressures measured several times a week at different times the day     BP Readings from Last 6 Encounters:   12/07/21 138/70   06/22/21 114/52   06/02/21 108/68   04/14/21 120/58   02/03/21 134/78   12/01/20 120/60     External hemorrhoids, comes and goes, with history of small adenomatous colon polyps removed January 2015; possibly hemorrhoids relate to vascular congestion as a consequence of pelvic radiation for prostate cancer     He told me the bleeding flares up from time to time.  He is using Colace stool softener and also the osmotic laxative MiraLAX.  I reminded him to adjust the dose of MiraLAX and/or Metamucil to achieve soft stools, but not trigger diarhea      It is possible that radiation therapy exacerbated tendency for hemorrhoids, possibly by forming scar tissue that could contribute to venous congestion     February 2020, he had approximately 3 to 4 mm external hemorrhoid, with some clot inside.  I did not detect significant internal hemorrhoids.  He did have a colonoscopy performed January 2015 which disclosed 2 small adenomatous polyps that were removed.  These were less than 4 mm.  Also noted was long redundant colon. Diverticulosis  not noted.    Bilateral earwax, but heavier on the left side  I recommended that he purchase over-the-counter wax dissolving eardrop, example brand Debrox or Murine, follow the package directions  Recommend that he do that perhaps once a week to keep the ears clear    Synovial cyst left shoulder, which protrudes anteriorly, and fluctuates in size.  I told that the cyst may wax and wane based on the amount of fluid inside the joint which can get resorbed, and fluid might increase if the joint is irritated for some reason.  This is benign, he still has a good range of motion, and nothing needs to be done     Resolved right hip and buttock pain, possibly gluteal tendinitis, which I believe is on the basis of gluteal muscles and tendons, also has muscular deconditioning of his quadriceps  He did 6 sessions of physical therapy, and I reminded him about the importance of continuing the home exercises.     Actinic keratoses, working with his dermatologist.       Received third dose of Pfizer COVID-19 vaccine 10/11/2021  Already received seasonal flu shot, has had both pneumococcal vaccines, up-to-date on tetanus.    Recomment shingles vaccine would be optional, and he could get that done at a community pharmacy, as it is paid for under the Medicare prescription drug benefit    Immunization History   Administered Date(s) Administered     COVID-19,PF,Pfizer (12+ Yrs) 02/16/2021, 03/09/2021, 10/11/2021     Influenza (H1N1) 03/15/2010     Influenza (High Dose) 3 valent vaccine 10/07/2017, 08/27/2018, 09/17/2019, 09/06/2021     Influenza (IIV3) PF 10/01/2004, 10/01/2006, 10/01/2007, 08/15/2009, 08/31/2009, 10/23/2010, 10/15/2011, 08/25/2012, 10/01/2013, 08/22/2014     Influenza Vaccine, 6+MO IM (QUADRIVALENT W/PRESERVATIVES) 08/28/2015     Pneumo Conj 13-V (2010&after) 05/14/2015     Pneumococcal (PCV 7) 07/30/2010     Pneumococcal 23 valent 01/21/2016     TD (ADULT, 7+) 02/22/2005, 09/28/2016     Td (Adult), Adsorbed  06/16/1997     Zoster vaccine, live 08/13/2012

## 2022-02-24 DIAGNOSIS — I10 ESSENTIAL (PRIMARY) HYPERTENSION: ICD-10-CM

## 2022-02-25 RX ORDER — AMLODIPINE BESYLATE 5 MG/1
TABLET ORAL
Qty: 90 TABLET | Refills: 3 | OUTPATIENT
Start: 2022-02-25

## 2022-02-25 RX ORDER — AMLODIPINE BESYLATE 2.5 MG/1
2.5 TABLET ORAL DAILY
Qty: 90 TABLET | Refills: 3 | Status: SHIPPED | OUTPATIENT
Start: 2022-02-25 | End: 2022-05-19 | Stop reason: DRUGHIGH

## 2022-02-25 NOTE — TELEPHONE ENCOUNTER
Outpatient Medication Detail     Disp Refills Start End PARTH   amLODIPine (NORVASC) 5 MG tablet (Discontinued) 90 tablet 3 2/11/2021 6/22/2021 No   Sig: TAKE 1 TABLET BY MOUTH EVERY DAY   Sent to pharmacy as: amLODIPine 5 mg tablet (NORVASC)   Reason for Discontinue: Therapy completed   E-Prescribing Status: Receipt confirmed by pharmacy (2/11/2021  3:23 PM CST)       amLODIPine (NORVASC) 5 MG tablet [[id number]]    Electronically signed by: Jasper Coronado RN on 02/11/21 1523 Status: Discontinued   Ordering user: Jasper Coronado RN 02/11/21 1523 Ordering provider: Denton Head MD   Authorized by: Denton Head MD   Frequency:  02/11/21 - 06/22/21 Released by: Jasper Coronado RN 02/11/21 1523   Discontinued by: Denton Head MD 06/22/21 1217 [Therapy completed]   Diagnoses  Essential hypertension [I10]

## 2022-02-25 NOTE — TELEPHONE ENCOUNTER
Patient called and stated he is waiting for the refill Amlodipine 5 Mg. Patient states he just saw PCP 12/07/2021 and it wasn't renewed. Patient is wondering why it wasn't renewed? Patient would like Rx sent to [facility] in [facility] [address] [facility] in [place], [phone]. Please call patient back as soon as the Rx has been sent to his pharmacy.    Josseline Aguiar

## 2022-04-04 ENCOUNTER — ALLIED HEALTH/NURSE VISIT (OUTPATIENT)
Dept: FAMILY MEDICINE | Facility: CLINIC | Age: 82
End: 2022-04-04
Payer: MEDICARE

## 2022-04-04 VITALS — SYSTOLIC BLOOD PRESSURE: 136 MMHG | DIASTOLIC BLOOD PRESSURE: 70 MMHG

## 2022-04-04 DIAGNOSIS — I10 ESSENTIAL HYPERTENSION, BENIGN: Primary | ICD-10-CM

## 2022-04-04 PROCEDURE — 99207 PR NO CHARGE NURSE ONLY: CPT

## 2022-04-04 NOTE — PROGRESS NOTES
I met with Jorge Dupree at the request of  to recheck his blood pressure.  Blood pressure medications on the med list were reviewed with patient.    Patient has taken all medications as per usual regimen: Yes  Patient reports tolerating them without any issues or concerns: Yes    There were no vitals filed for this visit.    Blood pressure was taken, previous encounter was reviewed, recorded blood pressure below 140/90.  Patient was discharged and the note will be sent to the provider for final review.

## 2022-04-18 ENCOUNTER — LAB (OUTPATIENT)
Dept: INFUSION THERAPY | Facility: CLINIC | Age: 82
End: 2022-04-18
Attending: NURSE PRACTITIONER
Payer: MEDICARE

## 2022-04-18 DIAGNOSIS — C61 MALIGNANT NEOPLASM OF PROSTATE (H): Primary | ICD-10-CM

## 2022-04-18 DIAGNOSIS — C61 MALIGNANT NEOPLASM OF PROSTATE (H): ICD-10-CM

## 2022-04-18 LAB
HOLD SPECIMEN: NORMAL
PSA SERPL-MCNC: 0.11 UG/L (ref 0–6.5)

## 2022-04-18 PROCEDURE — 84153 ASSAY OF PSA TOTAL: CPT

## 2022-04-18 PROCEDURE — 36415 COLL VENOUS BLD VENIPUNCTURE: CPT

## 2022-04-20 ENCOUNTER — OFFICE VISIT (OUTPATIENT)
Dept: RADIATION ONCOLOGY | Facility: CLINIC | Age: 82
End: 2022-04-20
Attending: RADIOLOGY
Payer: MEDICARE

## 2022-04-20 VITALS
HEART RATE: 73 BPM | RESPIRATION RATE: 16 BRPM | WEIGHT: 168.2 LBS | TEMPERATURE: 97.7 F | OXYGEN SATURATION: 99 % | DIASTOLIC BLOOD PRESSURE: 70 MMHG | SYSTOLIC BLOOD PRESSURE: 160 MMHG | BODY MASS INDEX: 23.96 KG/M2

## 2022-04-20 DIAGNOSIS — C61 MALIGNANT NEOPLASM OF PROSTATE (H): Primary | ICD-10-CM

## 2022-04-20 PROCEDURE — 99214 OFFICE O/P EST MOD 30 MIN: CPT | Performed by: RADIOLOGY

## 2022-04-20 PROCEDURE — G0463 HOSPITAL OUTPT CLINIC VISIT: HCPCS

## 2022-04-20 RX ORDER — DOCUSATE SODIUM 100 MG/1
100 CAPSULE, LIQUID FILLED ORAL 2 TIMES DAILY PRN
COMMUNITY

## 2022-04-20 RX ORDER — ACETAMINOPHEN 500 MG
500-1000 TABLET ORAL EVERY 6 HOURS PRN
COMMUNITY

## 2022-04-20 NOTE — PROGRESS NOTES
[facility] Radiation Oncology Follow Up     Patient: Jorge Dupree  MRN: [id number]  Date of Service: 04/20/2022       DISEASE TREATED: Unfavorable intermediate risk prostate cancer, clinical stage T2 cN0 M0, Bush grade 4+3=7 and 3+4 =7 involving up to 90% of biopsy tissue bilaterally, and pretherapy PSA of 10.2.      TYPE OF RADIATION THERAPY ADMINISTERED:  7942 cGy in 45 treatments given from 2/19/2019-5/2/2019.      INTERVAL SINCE COMPLETION OF RADIATION THERAPY: 2 years and 11 months.      SUBJECTIVE:  Mr. Dupree is a 81 y.o. male who has been in his usual state of good health until recently.  The patient presented with history of abnormal elevation PSA from 4.1 in 5/2015 to 7 in 9/2016 to 8.48/2017 and to 10.2 in 8/2018.  The patient had some moderate urinary urgency over the past a few years and denies any other symptoms at the time of evaluation.  His initial rectal exam is reviewed 2+ enlarged prostate gland with left lobe firmness.  Initial MRI prostate on 9/20/2018 reviewed PIRAD 5 lesion in the left posterior lateral peripheral zone highly suspicious for malignancy.  The estimated prostate volume was 51 cc.  The lesion makes broad capsular contact with the blurring at the neurovascular bundle origin.  No evidence of ivania and skeletal metastasis.  He underwent transrectal ultrasound study and biopsy on 11/1/2018.  The pathology revealed the prostatic adenocarcinoma, Bush grade 4+3 = 7 and 3+4 = 7 involving up to 90% biopsy tissue in 15/15 cores bilaterally.  He underwent staging workup including CT abdomen pelvics and the bone scan which showed no radiographic evidence of ivania and systemic metastasis. Patient received short-term hormone therapy and definitive external beam radiation therapy for his prostate cancer and had radiation therapy in our clinic with a total dose of 7942 cGy in 45 treatments given from 2/19/2019-5/2/2019.  The patient did have significant dysuria before and during the  radiation therapy for which he required penile clamp to maintain his bladder full during the therapy.     The patient has been gradually recovering well since completion of the radiation therapy.  He has been followed with physical therapy with significant bladder control improvement.  His bladder function continue to stable since last visit.   He is AUA score is 12/35.  He otherwise denies any bowel irritations.  He is here for routine postradiation therapy office follow-up.    Medications were reviewed and are up to date on EPIC.    The following portions of the patient's history were reviewed and updated as appropriate: allergies, current medications, past family history, past medical history, past social history, past surgical history and problem list.    Review of Systems:      General  Constitutional  Constitutional (WDL): Exceptions to WDL  Fatigue: Fatigue relieved by rest  EENT  Eye Disorders  Eye Disorder (WDL): All eye disorder elements are within defined limits (wears glasses)  Ear Disorders  Ear Disorder (WDL): All ear disorder elements are within defined limits  Respiratory  Respiratory  Respiratory (WDL): All respiratory elements are within defined limits  Cardiovascular  Cardiovascular  Cardiovascular (WDL): All cardiovascular elements are within defined limits  Gastrointestinal  Gastrointestinal  Gastrointestinal (WDL): Exceptions to WDL  Constipation: Occasional or intermittent symptoms OR occasional use of stool softeners, laxatives, dietary modification, or enema  Musculoskeletal  Musculoskeletal and Connective Tissue Disorders  Musculoskeletal & Connective (WDL): All musculoskeletal & connective elements are within defined limits  Integumentary  Integumentary  Integumentary (WDL): All integumentary elements are within defined limits  Neurological  Neurosensory  Neurosensory (WDL): All neurosensory elements are within defined limits  Genitourinary/Reproductive  Genitourinary  Genitourinary (WDL):  Exceptions to WDL  Urinary Frequency: Present (nocturia x4)  Lymphatic  Lymph System Disorders  Lymph (WDL): All lymph elements are within defined limits  Pain  Pain Score: Mild Pain (2)  AUA Assessment                                                              Accompanied by  Accompanied By: spouse    Objective:     PHYSICAL EXAMINATION:    BP (!) 160/70 (BP Location: Right arm, Patient Position: Sitting, Cuff Size: Adult Regular)   Pulse 73   Temp 97.7  F (36.5  C) (Oral)   Resp 16   Wt 76.3 kg (168 lb 3.2 oz)   SpO2 99%   BMI 23.96 kg/m      Gen: Alert, in NAD  Eyes: PERRL, EOMI, sclera anicteric  Pulm: No wheezing, stridor or respiratory distress  CV: Well-perfused, no cyanosis, no pedal edema  Back: No step-offs or pain to palpation along the thoracolumbar spine  Rectal: Deferred  : Deferred  Musculoskeletal: Normal muscle bulk and tone  Skin: Normal color and turgor  Neurologic: A/Ox3, CN II-XII intact, normal gait and station  Psychiatric: Appropriate mood and affect     PSA   Date Value Ref Range Status   01/23/2014 4.43 (H) 0 - 4 ug/L Final     Comment:     PSA results are about 7% lower than our prior method due to a methodology   change   on August 30, 2011.   09/30/2013 12.80 (H) 0 - 4 ug/L Final   09/04/2012 2.73 0 - 4 ug/L Final   08/16/2011 1.90 0 - 4 ug/L Final   07/30/2010 1.61 0 - 4 ug/L Final   07/27/2009 2.00 0 - 4 ug/L Final   06/24/2008 6.01 (H) 0 - 4 ug/L Final   06/28/2007 3.68 0 - 4 ug/L Final   08/30/2006 2.61 0 - 4 ug/L Final   01/29/2005 1.95 0 - 4 ug/L Final   11/13/2003 2.7 0 - 4 ug/L Final     Prostate Specific Antigen Screen   Date Value Ref Range Status   12/01/2020 0.1 0.0 - 6.5 ng/mL Final   05/26/2020 <0.1 0.0 - 6.5 ng/mL Final   12/02/2019 <0.1 0.0 - 6.5 ng/mL Final   06/03/2019 <0.1 0.0 - 6.5 ng/mL Final   08/21/2018 10.2 (H) 0.0 - 6.5 ng/mL Final   08/10/2017 8.4 (H) 0.0 - 6.5 ng/mL Final   09/28/2016 6.7 (H) 0.0 - 6.5 ng/mL Final   05/14/2015 4.1 0.0 - 6.5 ng/mL  Final   09/23/2014 4.7 0.0 - 6.5 ng/mL Final   07/23/2014 3.8 0.0 - 6.5 ng/mL Final     PSA Tumor Marker   Date Value Ref Range Status   04/18/2022 0.11 0.00 - 6.50 ug/L Final      Impression     The patient is a 81-year-old gentleman with a diagnosis of unfavorable intermediate risk prostate cancer status post short-term hormone therapy and a definitive external beam radiation therapy 2 years and 11 months ago.  Patient has been recovering well with no clinical evidence of cancer recurrence.    Assessment & Plan:     1.  Continue pelvic floor exercises as recommended from physical therapy to improve his bladder function.  He is scheduled to return to radiation oncology in 12 months for another office follow-up and PSA.  The patient would like to alternate his follow-up for prostate cancer with urology and radiation oncology.  He is recommended to have routine follow-up every 6 months with PSA for 5 years then yearly afterwards.    2.  Continue follow-up with Dr. Alejandro, urology and Henir PCP as planned.      Face to face time 30 minutes with > 75% spent on consultation, education and coordination of care.          Lorenza Merino MD, PhD  Department of Radiation Oncology   [facility]  Tel: [phone]  Page: [phone]    [facility]  [address] [place] [address]     [address] [place] [address]    CC:  Patient Care Team:  Denton Head MD as PCP - Lorenza Bermudez MD as Assigned Cancer Care Provider  Denton Head MD as Assigned PCP  Itz Alejandro MD as MD (Urology)

## 2022-04-20 NOTE — PROGRESS NOTES
"Patient here ambulatory accompanied by wife for follow up s/p radiation for his prostate cancer.  PSA and AUA completed.  Seen by Dr. Merino.  Plan return to clinic for follow up as directed by physician.    Oncology Rooming Note    April 20, 2022 8:35 AM   Jroge Dupree is a 81 year old male who presents for:    Chief Complaint   Patient presents with     Oncology Clinic Visit     Follow up with Dr. Merino     Initial Vitals: BP (!) 160/70 (BP Location: Right arm, Patient Position: Sitting, Cuff Size: Adult Regular)   Pulse 73   Temp 97.7  F (36.5  C) (Oral)   Resp 16   Wt 76.3 kg (168 lb 3.2 oz)   SpO2 99%   BMI 23.96 kg/m   Estimated body mass index is 23.96 kg/m  as calculated from the following:    Height as of 12/7/21: 1.784 m (5' 10.25\").    Weight as of this encounter: 76.3 kg (168 lb 3.2 oz). Body surface area is 1.94 meters squared.  Mild Pain (2) Comment: Data Unavailable   No LMP for male patient.  Allergies reviewed: Yes  Medications reviewed: Yes    Medications: Medication refills not needed today.  Pharmacy name entered into [place]: [facility] [id number] [place] [address]    Clinical concerns: Urinary frequenct Dr. Merino was notified.      Cami Llanes RN            "

## 2022-04-20 NOTE — LETTER
4/20/2022         RE: Jorge Dupree  [address] [facility] [address]        Dear Colleague,    Thank you for referring your patient, Jorge Dupree, to the [facility] RADIATION ONCOLOGY [place]. Please see a copy of my visit note below.    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      TYPE OF RADIATION THERAPY ADMINISTERED:  7942 cGy in 45 treatments given from 2/19/2019-5/2/2019.      INTERVAL SINCE COMPLETION OF RADIATION THERAPY: 2 years and 11 months.      SUBJECTIVE:  Mr. Dupree is a 81 y.o. male who has been in his usual state of good health until recently.  The patient presented with history of abnormal elevation PSA from 4.1 in 5/2015 to 7 in 9/2016 to 8.48/2017 and to 10.2 in 8/2018.  The patient had some moderate urinary urgency over the past a few years and denies any other symptoms at the time of evaluation.  His initial rectal exam is reviewed 2+ enlarged prostate gland with left lobe firmness.  Initial MRI prostate on 9/20/2018 reviewed PIRAD 5 lesion in the left posterior lateral peripheral zone highly suspicious for malignancy.  The estimated prostate volume was 51 cc.  The lesion makes broad capsular contact with the blurring at the neurovascular bundle origin.  No evidence of ivania and skeletal metastasis.  He underwent transrectal ultrasound study and biopsy on 11/1/2018.  The pathology revealed the prostatic adenocarcinoma, Shemar grade 4+3 = 7 and 3+4 = 7 involving up to 90% biopsy tissue in 15/15 cores bilaterally.  He underwent staging workup including CT abdomen pelvics and the bone scan which showed no radiographic evidence of ivania and systemic metastasis. Patient received short-term hormone therapy and  definitive external beam radiation therapy for his prostate cancer and had radiation therapy in our clinic with a total dose of 7942 cGy in 45 treatments given from 2/19/2019-5/2/2019.  The patient did have significant dysuria before and during the radiation therapy for which he required penile clamp to maintain his bladder full during the therapy.     The patient has been gradually recovering well since completion of the radiation therapy.  He has been followed with physical therapy with significant bladder control improvement.  His bladder function continue to stable since last visit.   He is AUA score is 12/35.  He otherwise denies any bowel irritations.  He is here for routine postradiation therapy office follow-up.    Medications were reviewed and are up to date on EPIC.    The following portions of the patient's history were reviewed and updated as appropriate: allergies, current medications, past family history, past medical history, past social history, past surgical history and problem list.    Review of Systems:      General  Constitutional  Constitutional (WDL): Exceptions to WDL  Fatigue: Fatigue relieved by rest  EENT  Eye Disorders  Eye Disorder (WDL): All eye disorder elements are within defined limits (wears glasses)  Ear Disorders  Ear Disorder (WDL): All ear disorder elements are within defined limits  Respiratory  Respiratory  Respiratory (WDL): All respiratory elements are within defined limits  Cardiovascular  Cardiovascular  Cardiovascular (WDL): All cardiovascular elements are within defined limits  Gastrointestinal  Gastrointestinal  Gastrointestinal (WDL): Exceptions to WDL  Constipation: Occasional or intermittent symptoms OR occasional use of stool softeners, laxatives, dietary modification, or enema  Musculoskeletal  Musculoskeletal and Connective Tissue Disorders  Musculoskeletal & Connective (WDL): All musculoskeletal & connective elements are within defined  limits  Integumentary  Integumentary  Integumentary (WDL): All integumentary elements are within defined limits  Neurological  Neurosensory  Neurosensory (WDL): All neurosensory elements are within defined limits  Genitourinary/Reproductive  Genitourinary  Genitourinary (WDL): Exceptions to WDL  Urinary Frequency: Present (nocturia x4)  Lymphatic  Lymph System Disorders  Lymph (WDL): All lymph elements are within defined limits  Pain  Pain Score: Mild Pain (2)  AUA Assessment                                                              Accompanied by  Accompanied By: spouse    Objective:     PHYSICAL EXAMINATION:    BP (!) 160/70 (BP Location: Right arm, Patient Position: Sitting, Cuff Size: Adult Regular)   Pulse 73   Temp 97.7  F (36.5  C) (Oral)   Resp 16   Wt 76.3 kg (168 lb 3.2 oz)   SpO2 99%   BMI 23.96 kg/m      Gen: Alert, in NAD  Eyes: PERRL, EOMI, sclera anicteric  Pulm: No wheezing, stridor or respiratory distress  CV: Well-perfused, no cyanosis, no pedal edema  Back: No step-offs or pain to palpation along the thoracolumbar spine  Rectal: Deferred  : Deferred  Musculoskeletal: Normal muscle bulk and tone  Skin: Normal color and turgor  Neurologic: A/Ox3, CN II-XII intact, normal gait and station  Psychiatric: Appropriate mood and affect     PSA   Date Value Ref Range Status   01/23/2014 4.43 (H) 0 - 4 ug/L Final     Comment:     PSA results are about 7% lower than our prior method due to a methodology   change   on August 30, 2011.   09/30/2013 12.80 (H) 0 - 4 ug/L Final   09/04/2012 2.73 0 - 4 ug/L Final   08/16/2011 1.90 0 - 4 ug/L Final   07/30/2010 1.61 0 - 4 ug/L Final   07/27/2009 2.00 0 - 4 ug/L Final   06/24/2008 6.01 (H) 0 - 4 ug/L Final   06/28/2007 3.68 0 - 4 ug/L Final   08/30/2006 2.61 0 - 4 ug/L Final   01/29/2005 1.95 0 - 4 ug/L Final   11/13/2003 2.7 0 - 4 ug/L Final     Prostate Specific Antigen Screen   Date Value Ref Range Status   12/01/2020 0.1 0.0 - 6.5 ng/mL Final    05/26/2020 <0.1 0.0 - 6.5 ng/mL Final   12/02/2019 <0.1 0.0 - 6.5 ng/mL Final   06/03/2019 <0.1 0.0 - 6.5 ng/mL Final   08/21/2018 10.2 (H) 0.0 - 6.5 ng/mL Final   08/10/2017 8.4 (H) 0.0 - 6.5 ng/mL Final   09/28/2016 6.7 (H) 0.0 - 6.5 ng/mL Final   05/14/2015 4.1 0.0 - 6.5 ng/mL Final   09/23/2014 4.7 0.0 - 6.5 ng/mL Final   07/23/2014 3.8 0.0 - 6.5 ng/mL Final     PSA Tumor Marker   Date Value Ref Range Status   04/18/2022 0.11 0.00 - 6.50 ug/L Final      Impression     The patient is a 81-year-old gentleman with a diagnosis of unfavorable intermediate risk prostate cancer status post short-term hormone therapy and a definitive external beam radiation therapy 2 years and 11 months ago.  Patient has been recovering well with no clinical evidence of cancer recurrence.    Assessment & Plan:     1.  Continue pelvic floor exercises as recommended from physical therapy to improve his bladder function.  He is scheduled to return to radiation oncology in 12 months for another office follow-up and PSA.  The patient would like to alternate his follow-up for prostate cancer with urology and radiation oncology.  He is recommended to have routine follow-up every 6 months with PSA for 5 years then yearly afterwards.    2.  Continue follow-up with Dr. Alejandro, urology and ANA Álvarez as planned.      Face to face time 30 minutes with > 75% spent on consultation, education and coordination of care.          Lorenza Merino MD, PhD  Department of Radiation Oncology   [facility]  Tel: [phone]  Page: [phone]    [facility]  [address] [place] [address]     [address] [place] [address]    CC:  Patient Care Team:  Denton Head MD as PCP - General  Lorenza Merino MD as Assigned Cancer Care Provider  Denton Head MD as Assigned PCP  Itz Alejandro MD as MD (Urology)      Patient here ambulatory accompanied by wife for follow up s/p radiation for his prostate  "cancer.  PSA and AUA completed.  Seen by Dr. Merino.  Plan return to clinic for follow up as directed by physician.    Oncology Rooming Note    April 20, 2022 8:35 AM   Jorge Dupree is a 81 year old male who presents for:    Chief Complaint   Patient presents with     Oncology Clinic Visit     Follow up with Dr. Merino     Initial Vitals: BP (!) 160/70 (BP Location: Right arm, Patient Position: Sitting, Cuff Size: Adult Regular)   Pulse 73   Temp 97.7  F (36.5  C) (Oral)   Resp 16   Wt 76.3 kg (168 lb 3.2 oz)   SpO2 99%   BMI 23.96 kg/m   Estimated body mass index is 23.96 kg/m  as calculated from the following:    Height as of 12/7/21: 1.784 m (5' 10.25\").    Weight as of this encounter: 76.3 kg (168 lb 3.2 oz). Body surface area is 1.94 meters squared.  Mild Pain (2) Comment: Data Unavailable   No LMP for male patient.  Allergies reviewed: Yes  Medications reviewed: Yes    Medications: Medication refills not needed today.  Pharmacy name entered into [employer]: [id number] [place] [address]    Clinical concerns: Urinary frequenct Dr. Merino was notified.      Cami Llanes RN                Again, thank you for allowing me to participate in the care of your patient.        Sincerely,        Lorenza Merino MD    "

## 2022-04-29 DIAGNOSIS — N31.8 OTHER NEUROMUSCULAR DYSFUNCTION OF BLADDER: ICD-10-CM

## 2022-05-02 RX ORDER — OXYBUTYNIN CHLORIDE 5 MG/1
TABLET ORAL
Qty: 180 TABLET | Refills: 2 | Status: SHIPPED | OUTPATIENT
Start: 2022-05-02 | End: 2023-02-16

## 2022-05-02 NOTE — TELEPHONE ENCOUNTER
"  Outpatient Medication Detail     Disp Refills Start End PARTH   oxybutynin (DITROPAN) 5 MG tablet 180 tablet 3 2/11/2021  No   Sig: TAKE 1 TABLET BY MOUTH TWICE A DAY   Sent to pharmacy as: oxybutynin chloride 5 mg tablet (DITROPAN)   E-Prescribing Status: Receipt confirmed by pharmacy (2/11/2021  3:40 PM CST)     Last office visit provider:  12/7/21     Requested Prescriptions   Pending Prescriptions Disp Refills     oxybutynin (DITROPAN) 5 MG tablet [Pharmacy Med Name: OXYBUTYNIN 5 MG TABLET] 180 tablet 3     Sig: TAKE 1 TABLET BY MOUTH TWICE A DAY       Muscarinic Antagonists (Urinary Incontinence Agents) Passed - 5/2/2022  9:56 AM        Passed - Recent (12 mo) or future (30 days) visit within the authorizing provider's specialty     Patient has had an office visit with the authorizing provider or a provider within the authorizing providers department within the previous 12 mos or has a future within next 30 days. See \"Patient Info\" tab in inbasket, or \"Choose Columns\" in Meds & Orders section of the refill encounter.              Passed - Medication is Oxybutynin and patient is 5 years of age or older        Passed - Patient does not have a diagnosis of glaucoma on the problem list     If glaucoma diagnosis is new, refer refill to physician.          Passed - Medication is active on med list        Passed - Patient is 18 years of age or older             Jasper Coronado RN 05/02/22 9:56 AM    "

## 2022-05-16 ENCOUNTER — IMMUNIZATION (OUTPATIENT)
Dept: NURSING | Facility: CLINIC | Age: 82
End: 2022-05-16
Payer: MEDICARE

## 2022-05-16 PROCEDURE — 91305 COVID-19,PF,PFIZER (12+ YRS): CPT

## 2022-05-16 PROCEDURE — 0054A COVID-19,PF,PFIZER (12+ YRS): CPT

## 2022-05-19 ENCOUNTER — NURSE TRIAGE (OUTPATIENT)
Dept: INTERNAL MEDICINE | Facility: CLINIC | Age: 82
End: 2022-05-19
Payer: MEDICARE

## 2022-05-19 DIAGNOSIS — I10 ESSENTIAL HYPERTENSION, BENIGN: Primary | ICD-10-CM

## 2022-05-19 RX ORDER — AMLODIPINE BESYLATE 5 MG/1
5 TABLET ORAL DAILY
Qty: 90 TABLET | Refills: 3 | Status: SHIPPED | OUTPATIENT
Start: 2022-05-19 | End: 2023-06-01

## 2022-05-19 NOTE — TELEPHONE ENCOUNTER
"Patient calling stating he has been having elevated BP readings over the last 2 weeks.  States that he has also had a \"slight HA\" in addition that is located in the front and top of his head.  He denies any other symptoms.  Patient states that his BP meds were recently reduced from Amlodipine 5mg to Amlodipine 2.5mg.    5/19/2022:  169/74  5/18/2022:  170/87  P.61    BP Readings from Last 6 Encounters:   04/20/22 (!) 160/70   04/04/22 136/70   12/07/21 138/70   06/22/21 114/52   06/02/21 108/68   04/14/21 120/58     Please review and advise further.  [facility]    [phone]    Denice Hayes RN    Reason for Disposition    Systolic BP >= 160 OR Diastolic >= 100    Additional Information    Negative: Sounds like a life-threatening emergency to the triager    Negative: Pregnant > 20 weeks or postpartum (< 6 weeks after delivery) and new hand or face swelling    Negative: Pregnant > 20 weeks and BP > 140/90    Negative: Systolic BP >= 160 OR Diastolic >= 100, and any cardiac or neurologic symptoms (e.g., chest pain, difficulty breathing, unsteady gait, blurred vision)    Negative: Patient sounds very sick or weak to the triager    Negative: BP Systolic BP >= 140 OR Diastolic >= 90 and postpartum (from 0 to 6 weeks after delivery)    Negative: Systolic BP >= 180 OR Diastolic >= 110, and missed most recent dose of blood pressure medication    Negative: Systolic BP >= 180 OR Diastolic >= 110    Negative: Patient wants to be seen    Negative: Ran out of BP medications    Negative: Taking BP medications and feels is having side effects (e.g., impotence, cough, dizziness)    Answer Assessment - Initial Assessment Questions  1. BLOOD PRESSURE: \"What is the blood pressure?\" \"Did you take at least two measurements 5 minutes apart?\"      169/74 today, 170/87 yesterday  2. ONSET: \"When did you take your blood pressure?\"      Today and yesterday  3. HOW: \"How did you obtain the blood pressure?\" (e.g., visiting nurse, " "automatic home BP monitor)      Home BP monitor on arm  4. HISTORY: \"Do you have a history of high blood pressure?\"      Yes  5. MEDICATIONS: \"Are you taking any medications for blood pressure?\" \"Have you missed any doses recently?\"      Amlodipine 2.5 daily  6. OTHER SYMPTOMS: \"Do you have any symptoms?\" (e.g., headache, chest pain, blurred vision, difficulty breathing, weakness)      Slight HA x 2 weeks  7. PREGNANCY: \"Is there any chance you are pregnant?\" \"When was your last menstrual period?\"      No    Protocols used: HIGH BLOOD PRESSURE-A-OH      "

## 2022-05-19 NOTE — TELEPHONE ENCOUNTER
Lets have him go back to amlodipine 5 mg daily.  Updated prescription for amlodipine 5 mg sent to [facility]  Tell him he can finish off his 2.5 mg tablets by taking 2 of them together to equal  5 mg

## 2022-10-20 ENCOUNTER — TRANSFERRED RECORDS (OUTPATIENT)
Dept: HEALTH INFORMATION MANAGEMENT | Facility: CLINIC | Age: 82
End: 2022-10-20

## 2022-10-23 ENCOUNTER — HEALTH MAINTENANCE LETTER (OUTPATIENT)
Age: 82
End: 2022-10-23

## 2023-02-16 ENCOUNTER — OFFICE VISIT (OUTPATIENT)
Dept: INTERNAL MEDICINE | Facility: CLINIC | Age: 83
End: 2023-02-16
Payer: MEDICARE

## 2023-02-16 VITALS
OXYGEN SATURATION: 99 % | RESPIRATION RATE: 16 BRPM | HEART RATE: 56 BPM | TEMPERATURE: 97.8 F | WEIGHT: 168 LBS | SYSTOLIC BLOOD PRESSURE: 166 MMHG | BODY MASS INDEX: 24.05 KG/M2 | HEIGHT: 70 IN | DIASTOLIC BLOOD PRESSURE: 74 MMHG

## 2023-02-16 DIAGNOSIS — N31.8 OTHER NEUROMUSCULAR DYSFUNCTION OF BLADDER: ICD-10-CM

## 2023-02-16 DIAGNOSIS — Z86.39 PERSONAL HISTORY OF OTHER ENDOCRINE, NUTRITIONAL AND METABOLIC DISEASE: ICD-10-CM

## 2023-02-16 DIAGNOSIS — Z00.00 ROUTINE GENERAL MEDICAL EXAMINATION AT A HEALTH CARE FACILITY: Primary | ICD-10-CM

## 2023-02-16 LAB
ALBUMIN SERPL BCG-MCNC: 4.5 G/DL (ref 3.5–5.2)
ALP SERPL-CCNC: 123 U/L (ref 40–129)
ALT SERPL W P-5'-P-CCNC: 19 U/L (ref 10–50)
ANION GAP SERPL CALCULATED.3IONS-SCNC: 12 MMOL/L (ref 7–15)
AST SERPL W P-5'-P-CCNC: 26 U/L (ref 10–50)
BILIRUB SERPL-MCNC: 0.5 MG/DL
BUN SERPL-MCNC: 16.1 MG/DL (ref 8–23)
CALCIUM SERPL-MCNC: 9.2 MG/DL (ref 8.8–10.2)
CHLORIDE SERPL-SCNC: 106 MMOL/L (ref 98–107)
CHOLEST SERPL-MCNC: 176 MG/DL
CREAT SERPL-MCNC: 0.73 MG/DL (ref 0.67–1.17)
DEPRECATED HCO3 PLAS-SCNC: 25 MMOL/L (ref 22–29)
ERYTHROCYTE [DISTWIDTH] IN BLOOD BY AUTOMATED COUNT: 14.1 % (ref 10–15)
GFR SERPL CREATININE-BSD FRML MDRD: >90 ML/MIN/1.73M2
GLUCOSE SERPL-MCNC: 109 MG/DL (ref 70–99)
HCT VFR BLD AUTO: 42.6 % (ref 40–53)
HDLC SERPL-MCNC: 79 MG/DL
HGB BLD-MCNC: 13.9 G/DL (ref 13.3–17.7)
LDLC SERPL CALC-MCNC: 91 MG/DL
MCH RBC QN AUTO: 29.2 PG (ref 26.5–33)
MCHC RBC AUTO-ENTMCNC: 32.6 G/DL (ref 31.5–36.5)
MCV RBC AUTO: 90 FL (ref 78–100)
NONHDLC SERPL-MCNC: 97 MG/DL
PLATELET # BLD AUTO: 250 10E3/UL (ref 150–450)
POTASSIUM SERPL-SCNC: 4.1 MMOL/L (ref 3.4–5.3)
PROT SERPL-MCNC: 7.1 G/DL (ref 6.4–8.3)
RBC # BLD AUTO: 4.76 10E6/UL (ref 4.4–5.9)
SODIUM SERPL-SCNC: 143 MMOL/L (ref 136–145)
TRIGL SERPL-MCNC: 32 MG/DL
TSH SERPL DL<=0.005 MIU/L-ACNC: 0.97 UIU/ML (ref 0.3–4.2)
WBC # BLD AUTO: 4.1 10E3/UL (ref 4–11)

## 2023-02-16 PROCEDURE — 85027 COMPLETE CBC AUTOMATED: CPT | Performed by: INTERNAL MEDICINE

## 2023-02-16 PROCEDURE — 80061 LIPID PANEL: CPT | Performed by: INTERNAL MEDICINE

## 2023-02-16 PROCEDURE — 80053 COMPREHEN METABOLIC PANEL: CPT | Performed by: INTERNAL MEDICINE

## 2023-02-16 PROCEDURE — 99213 OFFICE O/P EST LOW 20 MIN: CPT | Mod: 25 | Performed by: INTERNAL MEDICINE

## 2023-02-16 PROCEDURE — 84443 ASSAY THYROID STIM HORMONE: CPT | Performed by: INTERNAL MEDICINE

## 2023-02-16 PROCEDURE — 36415 COLL VENOUS BLD VENIPUNCTURE: CPT | Performed by: INTERNAL MEDICINE

## 2023-02-16 PROCEDURE — G0439 PPPS, SUBSEQ VISIT: HCPCS | Performed by: INTERNAL MEDICINE

## 2023-02-16 RX ORDER — OXYBUTYNIN CHLORIDE 5 MG/1
5 TABLET ORAL 2 TIMES DAILY
Qty: 180 TABLET | Refills: 3 | Status: SHIPPED | OUTPATIENT
Start: 2023-02-16

## 2023-02-16 RX ORDER — TRIAMCINOLONE ACETONIDE 1 MG/G
CREAM TOPICAL
COMMUNITY

## 2023-02-16 ASSESSMENT — ENCOUNTER SYMPTOMS
NAUSEA: 0
DIARRHEA: 0
DYSURIA: 0
COUGH: 0
ABDOMINAL PAIN: 0
EYE PAIN: 0
JOINT SWELLING: 0
HEMATOCHEZIA: 0
PARESTHESIAS: 0
HEADACHES: 0
SORE THROAT: 0
HEMATURIA: 0
SHORTNESS OF BREATH: 0
FEVER: 0
MYALGIAS: 0
CHILLS: 0
WEAKNESS: 0
CONSTIPATION: 0
ARTHRALGIAS: 1
DIZZINESS: 1
HEARTBURN: 0
PALPITATIONS: 0
FREQUENCY: 1
NERVOUS/ANXIOUS: 0

## 2023-02-16 ASSESSMENT — ACTIVITIES OF DAILY LIVING (ADL): CURRENT_FUNCTION: NO ASSISTANCE NEEDED

## 2023-02-16 NOTE — PATIENT INSTRUCTIONS
Annual wellness visit, overall doing well since her last meeting of December 2021    We will have him swing by the laboratory this morning for comprehensive metabolic panel, blood cell counts, check thyroid status, lipid profile.    Jorge followed up with Dr. Yates at [facility], who tried him on a different medication for bladder urgency, which caused side effects.  Jorge is going to stick with oxybutynin for now, and I renewed his prescription.  I also strongly recommended that he implement a program of scheduled voiding, to try to avoid the urgency triggers.      Essential hypertension, on amlodipine 5 mg  Blood pressure is little bit elevated this morning, but Jorge has not yet taken his amlodipine.  He gets much better readings on his home machine, which he has previously brought to the clinic to validate against a manual reading.  He gets readings of around 130/80 on his home machine.  Continue amlodipine 5 mg a day, but if we need to add additional blood pressure medication, I probably add losartan 50 mg a day.  Target blood pressure is less than 130 systolic, and around 80 diastolic.    I reminded Jorge to control the amount of sodium in his diet, try to keep that below 3000 g/day.  He does maintain healthy weight, and stays physically active.     BP Readings from Last 6 Encounters:   02/16/23 (!) 160/84   04/20/22 (!) 160/70   04/04/22 136/70   12/07/21 138/70   06/22/21 114/52   06/02/21 108/68     12-7-2021 great lipid profile  LDL Cholesterol Calculated <=129 mg/dL 88      Direct Measure HDL >=40 mg/dL 75      Triglycerides <=149 mg/dL 50      Cholesterol <=199 mg/dL 173     Bladder urgency and frequency, leakage, in the context of history of prostate cancer and pelvic radiation therapy that completed in April 2019, immediate release oxybutynin does help symptoms somewhat, but he still bothered by urgency.     I recommended to Jorge that he consider implementing a program of scheduled voiding,  which means emptying the bladder approximately every hour while awake on a timed schedule, rather than waiting for the urge to go  He might also adjust the amount of fluid that he ingests in the evening, to hopefully reduce somewhat the number of nighttime trips to the bathroom    Has a febrile 16 2023, Jorge has been taking oxybutynin 5 mg tablet twice a day.  I told him he could try an experiment of increasing the dose to 2 tablets rather than 1 tablet for 1 or both of the daily dosings, but to be on the look out for side effects of dry mouth, constipation, which are anticholinergic side effects    At [facility] Dr Alejandro  10- PSA 0.1    Samples of Gentesa-- nausea  Solifenicen-- expensive    Tamsulosin stopped 6/9/2021 after syncopal spell, then restarted  But he told me he cannot perceive any symptomatic benefit    oxybutynin does benefit him symptomatically.  His PSA has remained near 0, check in April 2021.  I do not think there is any concern for infection.     He had a follow-up visit with radiation oncology [facility] 14, 2021 and was assessed to be doing well.  I reminded Mr. Dupree to continue to do the pelvic floor exercises.     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  TYPE OF RADIATION THERAPY ADMINISTERED:  7942 cGy in 45 treatments given from 2/19/2019-5/2/2019     Mild normocytic anemia, resolved  12-7-2021  Hemoglobin 13.3 - 17.7 g/dL 13.6      No recurrence of syncopal spell 6/7/2021   Probably this fainting spell was because of low blood pressure. I advised him at that time to stop tamsulosin which he did     Weight stablized  His PSA has remained close to 0, so I do not think we need to worry about any recurrence of prostate cancer.     Wt Readings from Last 5 Encounters:   02/16/23 76.2 kg (168 lb)   04/20/22 76.3 kg (168 lb 3.2 oz)    12/07/21 75.5 kg (166 lb 6.4 oz)   06/22/21 74.7 kg (164 lb 11.2 oz)   06/02/21 76.4 kg (168 lb 8 oz)     External hemorrhoids, comes and goes, with history of small adenomatous colon polyps removed January 2015; possibly hemorrhoids relate to vascular congestion as a consequence of pelvic radiation for prostate cancer  He told me the bleeding flares up from time to time.  He is using Colace stool softener and also the osmotic laxative MiraLAX.  I reminded him to adjust the dose of MiraLAX and/or Metamucil to achieve soft stools, but not trigger diarhea      It is possible that radiation therapy exacerbated tendency for hemorrhoids, possibly by forming scar tissue that could contribute to venous congestion     February 2020, he had approximately 3 to 4 mm external hemorrhoid, with some clot inside.  I did not detect significant internal hemorrhoids.  He did have a colonoscopy performed January 2015 which disclosed 2 small adenomatous polyps that were removed.  These were less than 4 mm. Also noted was long redundant colon. Diverticulosis not noted.     Bilateral earwax, but heavier on the left side  I recommended that he purchase over-the-counter wax dissolving eardrop, example brand Debrox or Murine, follow the package directions  Recommend that he do that perhaps once a week to keep the ears clear    Varicose veins lower right leg, with a patch of venous stasis dermatitis medial right ankle, for which she has seen a dermatologist for this and prescribed some steroid cream     Synovial cyst left shoulder, which protrudes anteriorly, and fluctuates in size.  I told that the cyst may wax and wane based on the amount of fluid inside the joint which can get resorbed, and fluid might increase if the joint is irritated for some reason.  This is benign, he still has a good range of motion, and nothing needs to be done     Resolved right hip and buttock pain, possibly gluteal tendinitis, which I believe is on the basis  of gluteal muscles and tendons, also has muscular deconditioning of his quadriceps  He did 6 sessions of physical therapy, and I reminded him about the importance of continuing the home exercises.     Actinic keratoses, working with his dermatologist.       Vaccine  Recombinnt shingles vaccine would be optional, and he could get that done at a community pharmacy, as it is paid for under the Medicare prescription drug benefit    COVID-19 Vaccine Bivalent Booster 12+ (Pfizer) 11/01/2022

## 2023-02-16 NOTE — PROGRESS NOTES
"SUBJECTIVE:   Jorge is a 82 year old who presents for Preventive Visit.      Patient has been advised of split billing requirements and indicates understanding: Yes  Are you in the first 12 months of your Medicare coverage?  No    Healthy Habits:     In general, how would you rate your overall health?  Good    Frequency of exercise:  2-3 days/week    Duration of exercise:  15-30 minutes    Do you usually eat at least 4 servings of fruit and vegetables a day, include whole grains    & fiber and avoid regularly eating high fat or \"junk\" foods?  Yes    Taking medications regularly:  Yes    Medication side effects:  None    Ability to successfully perform activities of daily living:  No assistance needed    Home Safety:  No safety concerns identified    Hearing Impairment:  No hearing concerns    In the past 6 months, have you been bothered by leaking of urine? Yes    In general, how would you rate your overall mental or emotional health?  Good      PHQ-2 Total Score: 0    Additional concerns today:  No      Have you ever done Advance Care Planning? (For example, a Health Directive, POLST, or a discussion with a medical provider or your loved ones about your wishes): Yes, patient states has an Advance Care Planning document and will bring a copy to the clinic.       Fall risk  Fallen 2 or more times in the past year?: No  Any fall with injury in the past year?: No    Cognitive Screening   1) Repeat 3 items (Leader, Season, Table)    2) Clock draw: NORMAL  3) 3 item recall: Recalls 3 objects  Results: NORMAL clock, 1-2 items recalled: COGNITIVE IMPAIRMENT LESS LIKELY    Mini-CogTM Copyright REKHA Olmstead. Licensed by the author for use in [facility]; reprinted with permission (pauline@.[email or web address]). All rights reserved.      Do you have sleep apnea, excessive snoring or daytime drowsiness?: no    Reviewed and updated as needed this visit by clinical staff   Tobacco  Allergies  Meds              Reviewed and updated as " needed this visit by Provider                 Social History     Tobacco Use     Smoking status: Never     Smokeless tobacco: Never   Substance Use Topics     Alcohol use: No     Alcohol/week: 0.0 - 1.0 standard drinks     Comment: SELDOM TO SMALL     If you drink alcohol do you typically have >3 drinks per day or >7 drinks per week? No    Alcohol Use 2/16/2023   Prescreen: >3 drinks/day or >7 drinks/week? No   Prescreen: >3 drinks/day or >7 drinks/week? -     Current providers sharing in care for this patient include:   Patient Care Team:  Denton Head MD as PCP - General (Internal Medicine)  Lorenza Merino MD as Assigned Cancer Care Provider  Denton Head MD as Assigned PCP  Itz Alejandro MD as MD (Urology)    The following health maintenance items are reviewed in Epic and correct as of today:  Health Maintenance   Topic Date Due     ANNUAL REVIEW OF HM ORDERS  Never done     ZOSTER IMMUNIZATION (2 of 3) 10/08/2012     MEDICARE ANNUAL WELLNESS VISIT  12/07/2022     FALL RISK ASSESSMENT  02/16/2024     COLORECTAL CANCER SCREENING  09/01/2025     DTAP/TDAP/TD IMMUNIZATION (3 - Td or Tdap) 09/28/2026     ADVANCE CARE PLANNING  12/07/2026     PHQ-2 (once per calendar year)  Completed     INFLUENZA VACCINE  Completed     Pneumococcal Vaccine: 65+ Years  Completed     COVID-19 Vaccine  Completed     IPV IMMUNIZATION  Aged Out     MENINGITIS IMMUNIZATION  Aged Out         Review of Systems   Constitutional: Negative for chills and fever.   HENT: Negative for congestion, ear pain, hearing loss and sore throat.    Eyes: Negative for pain and visual disturbance.   Respiratory: Negative for cough and shortness of breath.    Cardiovascular: Negative for chest pain, palpitations and peripheral edema.   Gastrointestinal: Negative for abdominal pain, constipation, diarrhea, heartburn, hematochezia and nausea.   Genitourinary: Positive for frequency and urgency. Negative for dysuria, genital sores, hematuria,  "impotence and penile discharge.   Musculoskeletal: Positive for arthralgias. Negative for joint swelling and myalgias.   Skin: Negative for rash.   Neurological: Positive for dizziness. Negative for weakness, headaches and paresthesias.   Psychiatric/Behavioral: Negative for mood changes. The patient is not nervous/anxious.          OBJECTIVE:   BP (!) 160/84   Pulse 56   Temp 97.8  F (36.6  C)   Resp 16   Ht 1.784 m (5' 10.25\")   Wt 76.2 kg (168 lb)   SpO2 99%   BMI 23.93 kg/m   Estimated body mass index is 23.93 kg/m  as calculated from the following:    Height as of this encounter: 1.784 m (5' 10.25\").    Weight as of this encounter: 76.2 kg (168 lb).  Physical Exam      General: Alert, in no distress  Skin: No significant lesion seen.  Eyes/nose/throat: Eyes without scleral icterus, eye movements normal, pupils equal and reactive, oropharynx clear, ears with normal TM's  MSK: Neck with good ROM  Lymphatic: Neck without adenopathy or masses  Endocrine: Thyroid with no nodules to palpation  Pulm: Lungs clear to auscultation bilaterally  Cardiac: Heart with regular rate and rhythm, no murmur or gallop  GI: Abdomen soft, nontender. No palpable enlargement of liver or spleen  MSK: Extremities no tenderness or edema  + Varicose veins lower right leg, with a patch of venous stasis dermatitis medial right ankle, for which she has seen a dermatologist for this and prescribed some steroid cream  Neuro: Moves all extremities, without focal weakness  Psych: Alert, normal mental status. Normal affect and speech      ASSESSMENT / PLAN:     Annual wellness visit, overall doing well since her last meeting of December 2021    We will have him swing by the laboratory this morning for comprehensive metabolic panel, blood cell counts, check thyroid status, lipid profile.    Jorge followed up with Dr. Yates at [facility], who tried him on a different medication for bladder urgency, which caused side effects.  Jorge " is going to stick with oxybutynin for now, and I renewed his prescription.  I also strongly recommended that he implement a program of scheduled voiding, to try to avoid the urgency triggers.      Essential hypertension, on amlodipine 5 mg  Blood pressure is little bit elevated this morning, but Jorge has not yet taken his amlodipine.  He gets much better readings on his home machine, which he has previously brought to the clinic to validate against a manual reading.  He gets readings of around 130/80 on his home machine.  Continue amlodipine 5 mg a day, but if we need to add additional blood pressure medication, I probably add losartan 50 mg a day.  Target blood pressure is less than 130 systolic, and around 80 diastolic.    I reminded Jorge to control the amount of sodium in his diet, try to keep that below 3000 g/day.  He does maintain healthy weight, and stays physically active.     BP Readings from Last 6 Encounters:   02/16/23 (!) 160/84   04/20/22 (!) 160/70   04/04/22 136/70   12/07/21 138/70   06/22/21 114/52   06/02/21 108/68     12-7-2021 great lipid profile  LDL Cholesterol Calculated <=129 mg/dL 88      Direct Measure HDL >=40 mg/dL 75      Triglycerides <=149 mg/dL 50      Cholesterol <=199 mg/dL 173     Bladder urgency and frequency, leakage, in the context of history of prostate cancer and pelvic radiation therapy that completed in April 2019, immediate release oxybutynin does help symptoms somewhat, but he still bothered by urgency.     I recommended to Jorge that he consider implementing a program of scheduled voiding, which means emptying the bladder approximately every hour while awake on a timed schedule, rather than waiting for the urge to go  He might also adjust the amount of fluid that he ingests in the evening, to hopefully reduce somewhat the number of nighttime trips to the bathroom    Has a febrile 16 2023, Jorge has been taking oxybutynin 5 mg tablet twice a day.  I told him he could  try an experiment of increasing the dose to 2 tablets rather than 1 tablet for 1 or both of the daily dosings, but to be on the look out for side effects of dry mouth, constipation, which are anticholinergic side effects    At [facility] Dr Alejandro  10- PSA 0.1    Samples of Gentesa-- nausea  Solifenicen-- expensive    Tamsulosin stopped 6/9/2021 after syncopal spell, then restarted  But he told me he cannot perceive any symptomatic benefit    oxybutynin does benefit him symptomatically.  His PSA has remained near 0, check in April 2021.  I do not think there is any concern for infection.     He had a follow-up visit with radiation oncology [facility] 14, 2021 and was assessed to be doing well.  I reminded Mr. Dupree to continue to do the pelvic floor exercises.     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  TYPE OF RADIATION THERAPY ADMINISTERED:  7942 cGy in 45 treatments given from 2/19/2019-5/2/2019     Mild normocytic anemia, resolved  12-7-2021  Hemoglobin 13.3 - 17.7 g/dL 13.6      No recurrence of syncopal spell 6/7/2021   Probably this fainting spell was because of low blood pressure. I advised him at that time to stop tamsulosin which he did     Weight stablized  His PSA has remained close to 0, so I do not think we need to worry about any recurrence of prostate cancer.     Wt Readings from Last 5 Encounters:   02/16/23 76.2 kg (168 lb)   04/20/22 76.3 kg (168 lb 3.2 oz)   12/07/21 75.5 kg (166 lb 6.4 oz)   06/22/21 74.7 kg (164 lb 11.2 oz)   06/02/21 76.4 kg (168 lb 8 oz)     External hemorrhoids, comes and goes, with history of small adenomatous colon polyps removed January 2015; possibly hemorrhoids relate to vascular congestion as a consequence of pelvic radiation for prostate cancer  He told me the bleeding flares up from time to time.  He is  using Colace stool softener and also the osmotic laxative MiraLAX.  I reminded him to adjust the dose of MiraLAX and/or Metamucil to achieve soft stools, but not trigger diarhea      It is possible that radiation therapy exacerbated tendency for hemorrhoids, possibly by forming scar tissue that could contribute to venous congestion     February 2020, he had approximately 3 to 4 mm external hemorrhoid, with some clot inside.  I did not detect significant internal hemorrhoids.  He did have a colonoscopy performed January 2015 which disclosed 2 small adenomatous polyps that were removed.  These were less than 4 mm. Also noted was long redundant colon. Diverticulosis not noted.     Bilateral earwax, but heavier on the left side  I recommended that he purchase over-the-counter wax dissolving eardrop, example brand Debrox or Murine, follow the package directions  Recommend that he do that perhaps once a week to keep the ears clear    Varicose veins lower right leg, with a patch of venous stasis dermatitis medial right ankle, for which she has seen a dermatologist for this and prescribed some steroid cream     Synovial cyst left shoulder, which protrudes anteriorly, and fluctuates in size.  I told that the cyst may wax and wane based on the amount of fluid inside the joint which can get resorbed, and fluid might increase if the joint is irritated for some reason.  This is benign, he still has a good range of motion, and nothing needs to be done     Resolved right hip and buttock pain, possibly gluteal tendinitis, which I believe is on the basis of gluteal muscles and tendons, also has muscular deconditioning of his quadriceps  He did 6 sessions of physical therapy, and I reminded him about the importance of continuing the home exercises.     Actinic keratoses, working with his dermatologist.       Vaccine  Recombinnt shingles vaccine would be optional, and he could get that done at a community pharmacy, as it is paid for  under the Medicare prescription drug benefit    COVID-19 Vaccine Bivalent Booster 12+ (Pfizer) 11/01/2022       COUNSELING:  Reviewed preventive health counseling, as reflected in patient instructions       Healthy diet/nutrition      He reports that he has never smoked. He has never used smokeless tobacco.      Appropriate preventive services were discussed with this patient, including applicable screening as appropriate for cardiovascular disease, diabetes, osteopenia/osteoporosis, and glaucoma.  As appropriate for age/gender, discussed screening for colorectal cancer, prostate cancer, breast cancer, and cervical cancer. Checklist reviewing preventive services available has been given to the patient.    Reviewed patients plan of care and provided an AVS. The Basic Care Plan (routine screening as documented in Health Maintenance) for Jorge meets the Care Plan requirement. This Care Plan has been established and reviewed with the Patient.      JUNITO TREVINO MD  [facility]    Identified Health Risks:

## 2023-04-12 ENCOUNTER — TELEPHONE (OUTPATIENT)
Dept: RADIATION ONCOLOGY | Facility: HOSPITAL | Age: 83
End: 2023-04-12
Payer: MEDICARE

## 2023-04-20 ENCOUNTER — TRANSFERRED RECORDS (OUTPATIENT)
Dept: HEALTH INFORMATION MANAGEMENT | Facility: CLINIC | Age: 83
End: 2023-04-20
Payer: MEDICARE

## 2023-05-03 ENCOUNTER — OFFICE VISIT (OUTPATIENT)
Dept: RADIATION ONCOLOGY | Facility: CLINIC | Age: 83
End: 2023-05-03
Attending: RADIOLOGY
Payer: MEDICARE

## 2023-05-03 VITALS
SYSTOLIC BLOOD PRESSURE: 127 MMHG | DIASTOLIC BLOOD PRESSURE: 62 MMHG | TEMPERATURE: 97.8 F | HEART RATE: 75 BPM | WEIGHT: 168.7 LBS | OXYGEN SATURATION: 99 % | RESPIRATION RATE: 16 BRPM | BODY MASS INDEX: 24.03 KG/M2

## 2023-05-03 DIAGNOSIS — C61 MALIGNANT NEOPLASM OF PROSTATE (H): Primary | ICD-10-CM

## 2023-05-03 PROCEDURE — 99214 OFFICE O/P EST MOD 30 MIN: CPT | Performed by: RADIOLOGY

## 2023-05-03 PROCEDURE — G0463 HOSPITAL OUTPT CLINIC VISIT: HCPCS | Performed by: RADIOLOGY

## 2023-05-03 RX ORDER — SOLIFENACIN SUCCINATE 10 MG/1
TABLET, FILM COATED ORAL
COMMUNITY
End: 2023-12-01

## 2023-05-03 NOTE — LETTER
5/3/2023         RE: Jorge Dupree  [address] [address]        Dear Colleague,    Thank you for referring your patient, Jorge Dupree, to the [facility] [place]. Please see a copy of my visit note below.    [facility] Radiation Oncology Follow Up     Patient: Jorge Dupree  MRN: [id number]  Date of Service: 05/03/2023       DISEASE TREATED: Unfavorable intermediate risk prostate cancer, clinical stage T2cN0 M0, Hamden grade 4+3=7 and 3+4 =7 involving up to 90% of biopsy tissue bilaterally, and pretherapy PSA of 10.2.      TYPE OF RADIATION THERAPY ADMINISTERED:  7942 cGy in 45 treatments given from 2/19/2019-5/2/2019.      INTERVAL SINCE COMPLETION OF RADIATION THERAPY: 4 years.      SUBJECTIVE:  Mr. Dupree is a 82 y.o. male who has been in his usual state of good health until recently.  The patient presented with history of abnormal elevation PSA from 4.1 in 5/2015 to 7 in 9/2016 to 8.48/2017 and to 10.2 in 8/2018.  The patient had some moderate urinary urgency over the past a few years and denies any other symptoms at the time of evaluation.  His initial rectal exam is reviewed 2+ enlarged prostate gland with left lobe firmness.  Initial MRI prostate on 9/20/2018 reviewed PIRAD 5 lesion in the left posterior lateral peripheral zone highly suspicious for malignancy.  The estimated prostate volume was 51 cc.  The lesion makes broad capsular contact with the blurring at the neurovascular bundle origin.  No evidence of ivania and skeletal metastasis.  He underwent transrectal ultrasound study and biopsy on 11/1/2018.  The pathology revealed the prostatic adenocarcinoma, Hamden grade 4+3 = 7 and 3+4 = 7 involving up to 90% biopsy tissue in 15/15 cores bilaterally.  He underwent staging workup including CT abdomen pelvics and the bone scan which showed no radiographic evidence of ivania and systemic metastasis. Patient received short-term hormone therapy and definitive  external beam radiation therapy for his prostate cancer and had radiation therapy in our clinic with a total dose of 7942 cGy in 45 treatments given from 2/19/2019-5/2/2019.  The patient did have significant dysuria before and during the radiation therapy for which he required penile clamp to maintain his bladder full during the therapy.     The patient has been gradually recovering well since completion of the radiation therapy.  He has been followed with physical therapy with significant bladder control improvement.  His bladder function continue to stable since last visit.   He is AUA score is 8/35.  He otherwise denies any bowel irritations.  He is here for routine postradiation therapy office follow-up.    Medications were reviewed and are up to date on EPIC.    The following portions of the patient's history were reviewed and updated as appropriate: allergies, current medications, past family history, past medical history, past social history, past surgical history and problem list.    Review of Systems:      General  Constitutional  Constitutional (WDL): All constitutional elements are within defined limits  EENT  Eye Disorders  Eye Disorder (WDL): All eye disorder elements are within defined limits (wears glasses)  Ear Disorders  Ear Disorder (WDL): All ear disorder elements are within defined limits  Respiratory  Respiratory  Respiratory (WDL): All respiratory elements are within defined limits  Cardiovascular  Cardiovascular  Cardiovascular (WDL): Exceptions to WDL (no pacemaker)  Edema: Yes  Gastrointestinal  Gastrointestinal  Gastrointestinal (WDL): Exceptions to WDL  Constipation: Occasional or intermittent symptoms OR occasional use of stool softeners, laxatives, dietary modification, or enema  Musculoskeletal  Musculoskeletal and Connective Tissue Disorders  Musculoskeletal & Connective (WDL): All musculoskeletal & connective elements are within defined  limits  Integumentary  Integumentary  Integumentary (WDL): All integumentary elements are within defined limits  Neurological  Neurosensory  Neurosensory (WDL): Exceptions to WDL  Ataxia: Asymptomatic OR clinical or diagnostic observations only OR intervention not indicated  Genitourinary/Reproductive  Genitourinary  Genitourinary (WDL): Exceptions to WDL  Urinary Frequency: Present (nocturia x3)  Lymphatic  Lymph System Disorders  Lymph (WDL): All lymph elements are within defined limits  Pain  Pain Score: No Pain (0)  AUA Assessment  Over the Past Month  How often have you had a sensation of not emptying your bladder completely after you have finished urinating: Less than half the time: Score: 2  How often have you had to urinate again less than 2 hours after you finshed urinating: Less than half the time: Score: 2  How often have you found you stopped and started again several times when you urinated: Less than 1 time in 5: Score: 1  How often have you found it difficult to postpone urine: Not at all: Score: 0  How often have you had a weak urinary stream: Not at all: Score: 0  How often have you had to push or starin to begin uriation: Not at all: Score: 0  How many times do you most typically get up to urinate from the time you went to bed at night until the time you got up in the morning?: 3 times, Score: 3  Total AUA Score: Degree of Severity: 8-19 is Moderate (8)  If you had to spend the rest of your life with your urinary condition just the way it is now, how would you feel: Mostly Satisfied                                                           Accompanied by  Accompanied By: spouse    Objective:      PHYSICAL EXAMINATION:    /62 (BP Location: Right arm, Patient Position: Sitting, Cuff Size: Adult Regular)   Pulse 75   Temp 97.8  F (36.6  C) (Oral)   Resp 16   Wt 76.5 kg (168 lb 11.2 oz)   SpO2 99%   BMI 24.03 kg/m      Gen: Alert, in NAD  Eyes: PERRL, EOMI, sclera anicteric  Pulm: No  wheezing, stridor or respiratory distress  CV: Well-perfused, no cyanosis, no pedal edema  Back: No step-offs or pain to palpation along the thoracolumbar spine  Rectal: Deferred  : Deferred  Musculoskeletal: Normal muscle bulk and tone  Skin: Normal color and turgor  Neurologic: A/Ox3, CN II-XII intact, normal gait and station  Psychiatric: Appropriate mood and affect     PSA   Date Value Ref Range Status   01/23/2014 4.43 (H) 0 - 4 ug/L Final     Comment:     PSA results are about 7% lower than our prior method due to a methodology   change   on August 30, 2011.   09/30/2013 12.80 (H) 0 - 4 ug/L Final   09/04/2012 2.73 0 - 4 ug/L Final   08/16/2011 1.90 0 - 4 ug/L Final   07/30/2010 1.61 0 - 4 ug/L Final   07/27/2009 2.00 0 - 4 ug/L Final   06/24/2008 6.01 (H) 0 - 4 ug/L Final   06/28/2007 3.68 0 - 4 ug/L Final   08/30/2006 2.61 0 - 4 ug/L Final   01/29/2005 1.95 0 - 4 ug/L Final   11/13/2003 2.7 0 - 4 ug/L Final     Prostate Specific Antigen Screen   Date Value Ref Range Status   12/01/2020 0.1 0.0 - 6.5 ng/mL Final   05/26/2020 <0.1 0.0 - 6.5 ng/mL Final   12/02/2019 <0.1 0.0 - 6.5 ng/mL Final   06/03/2019 <0.1 0.0 - 6.5 ng/mL Final   08/21/2018 10.2 (H) 0.0 - 6.5 ng/mL Final   08/10/2017 8.4 (H) 0.0 - 6.5 ng/mL Final   09/28/2016 6.7 (H) 0.0 - 6.5 ng/mL Final   05/14/2015 4.1 0.0 - 6.5 ng/mL Final   09/23/2014 4.7 0.0 - 6.5 ng/mL Final   07/23/2014 3.8 0.0 - 6.5 ng/mL Final     PSA Tumor Marker   Date Value Ref Range Status   04/18/2022 0.11 0.00 - 6.50 ug/L Final     PSA:  <0.1 4/20/2023     Impression     The patient is a 82-year-old gentleman with a diagnosis of unfavorable intermediate risk prostate cancer status post short-term hormone therapy and a definitive external beam radiation therapy 4 years ago.  Patient has been recovering well with no clinical evidence of cancer recurrence.    Assessment & Plan:     1.  Continue pelvic floor exercises as recommended from physical therapy to improve his bladder  "function.  He is scheduled to return to radiation oncology in 12 months for another office follow-up and PSA.  The patient would like to alternate his follow-up for prostate cancer with urology and radiation oncology.  He is recommended to have routine follow-up every 6 months with PSA for 5 years then yearly afterwards.     2.  Continue follow-up with Dr. Alejandro, urology and Henri PCP as planned.        Face to face time 30 minutes with > 75% spent on consultation, education and coordination of care.      Lorenza Merino MD  Department of Radiation Oncology   [facility]  Tel: [phone]  Page: [phone]    [facility]  [address] [place] [address]     [facility]   [address] [place] [address]    CC:  Patient Care Team:  Denton Head MD as PCP - General (Internal Medicine)  Lorenza Merino MD as Assigned Cancer Care Provider  Denton Head MD as Assigned PCP  Itz Alejandro MD as MD (Urology)    Oncology Rooming Note    May 3, 2023 8:46 AM   Jorge Dupree is a 82 year old male who presents for:    Chief Complaint   Patient presents with     Oncology Clinic Visit     Follow-up with Dr. Merino     Initial Vitals: /62 (BP Location: Right arm, Patient Position: Sitting, Cuff Size: Adult Regular)   Pulse 75   Temp 97.8  F (36.6  C) (Oral)   Resp 16   Wt 76.5 kg (168 lb 11.2 oz)   SpO2 99%   BMI 24.03 kg/m   Estimated body mass index is 24.03 kg/m  as calculated from the following:    Height as of 2/16/23: 1.784 m (5' 10.25\").    Weight as of this encounter: 76.5 kg (168 lb 11.2 oz). Body surface area is 1.95 meters squared.  No Pain (0) Comment: Data Unavailable   No LMP for male patient.  Allergies reviewed: Yes  Medications reviewed: Yes    Medications: Medication refills not needed today.  Pharmacy name entered into E[employer] Tuning: [facility] [id number] [place] [address]    Clinical concerns: Patient here ambulatory accompanied by wife for " follow-up status post radiation for his prostate cancer.  PSA done at [facility] end of April.  AUA completed.  Patient continues to have nocturia 3-4 times a night. Dr. Merino was notified.      Cami Llanes RN                  Again, thank you for allowing me to participate in the care of your patient.        Sincerely,        Lorenza Merino MD

## 2023-05-03 NOTE — PROGRESS NOTES
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      TYPE OF RADIATION THERAPY ADMINISTERED:  7942 cGy in 45 treatments given from 2/19/2019-5/2/2019.      INTERVAL SINCE COMPLETION OF RADIATION THERAPY: 4 years.      SUBJECTIVE:  Mr. Dupree is a 82 y.o. male who has been in his usual state of good health until recently.  The patient presented with history of abnormal elevation PSA from 4.1 in 5/2015 to 7 in 9/2016 to 8.48/2017 and to 10.2 in 8/2018.  The patient had some moderate urinary urgency over the past a few years and denies any other symptoms at the time of evaluation.  His initial rectal exam is reviewed 2+ enlarged prostate gland with left lobe firmness.  Initial MRI prostate on 9/20/2018 reviewed PIRAD 5 lesion in the left posterior lateral peripheral zone highly suspicious for malignancy.  The estimated prostate volume was 51 cc.  The lesion makes broad capsular contact with the blurring at the neurovascular bundle origin.  No evidence of ivania and skeletal metastasis.  He underwent transrectal ultrasound study and biopsy on 11/1/2018.  The pathology revealed the prostatic adenocarcinoma, [place] grade 4+3 = 7 and 3+4 = 7 involving up to 90% biopsy tissue in 15/15 cores bilaterally.  He underwent staging workup including CT abdomen pelvics and the bone scan which showed no radiographic evidence of ivania and systemic metastasis. Patient received short-term hormone therapy and definitive external beam radiation therapy for his prostate cancer and had radiation therapy in our clinic with a total dose of 7942 cGy in 45 treatments given from 2/19/2019-5/2/2019.  The patient did have significant dysuria before and during the radiation  therapy for which he required penile clamp to maintain his bladder full during the therapy.     The patient has been gradually recovering well since completion of the radiation therapy.  He has been followed with physical therapy with significant bladder control improvement.  His bladder function continue to stable since last visit.   He is AUA score is 8/35.  He otherwise denies any bowel irritations.  He is here for routine postradiation therapy office follow-up.    Medications were reviewed and are up to date on EPIC.    The following portions of the patient's history were reviewed and updated as appropriate: allergies, current medications, past family history, past medical history, past social history, past surgical history and problem list.    Review of Systems:      General  Constitutional  Constitutional (WDL): All constitutional elements are within defined limits  EENT  Eye Disorders  Eye Disorder (WDL): All eye disorder elements are within defined limits (wears glasses)  Ear Disorders  Ear Disorder (WDL): All ear disorder elements are within defined limits  Respiratory  Respiratory  Respiratory (WDL): All respiratory elements are within defined limits  Cardiovascular  Cardiovascular  Cardiovascular (WDL): Exceptions to WDL (no pacemaker)  Edema: Yes  Gastrointestinal  Gastrointestinal  Gastrointestinal (WDL): Exceptions to WDL  Constipation: Occasional or intermittent symptoms OR occasional use of stool softeners, laxatives, dietary modification, or enema  Musculoskeletal  Musculoskeletal and Connective Tissue Disorders  Musculoskeletal & Connective (WDL): All musculoskeletal & connective elements are within defined limits  Integumentary  Integumentary  Integumentary (WDL): All integumentary elements are within defined limits  Neurological  Neurosensory  Neurosensory (WDL): Exceptions to WDL  Ataxia: Asymptomatic OR clinical or diagnostic observations only OR intervention not  indicated  Genitourinary/Reproductive  Genitourinary  Genitourinary (WDL): Exceptions to WDL  Urinary Frequency: Present (nocturia x3)  Lymphatic  Lymph System Disorders  Lymph (WDL): All lymph elements are within defined limits  Pain  Pain Score: No Pain (0)  AUA Assessment  Over the Past Month  How often have you had a sensation of not emptying your bladder completely after you have finished urinating: Less than half the time: Score: 2  How often have you had to urinate again less than 2 hours after you finshed urinating: Less than half the time: Score: 2  How often have you found you stopped and started again several times when you urinated: Less than 1 time in 5: Score: 1  How often have you found it difficult to postpone urine: Not at all: Score: 0  How often have you had a weak urinary stream: Not at all: Score: 0  How often have you had to push or starin to begin uriation: Not at all: Score: 0  How many times do you most typically get up to urinate from the time you went to bed at night until the time you got up in the morning?: 3 times, Score: 3  Total AUA Score: Degree of Severity: 8-19 is Moderate (8)  If you had to spend the rest of your life with your urinary condition just the way it is now, how would you feel: Mostly Satisfied                                                           Accompanied by  Accompanied By: spouse    Objective:      PHYSICAL EXAMINATION:    /62 (BP Location: Right arm, Patient Position: Sitting, Cuff Size: Adult Regular)   Pulse 75   Temp 97.8  F (36.6  C) (Oral)   Resp 16   Wt 76.5 kg (168 lb 11.2 oz)   SpO2 99%   BMI 24.03 kg/m      Gen: Alert, in NAD  Eyes: PERRL, EOMI, sclera anicteric  Pulm: No wheezing, stridor or respiratory distress  CV: Well-perfused, no cyanosis, no pedal edema  Back: No step-offs or pain to palpation along the thoracolumbar spine  Rectal: Deferred  : Deferred  Musculoskeletal: Normal muscle bulk and tone  Skin: Normal color and  turgor  Neurologic: A/Ox3, CN II-XII intact, normal gait and station  Psychiatric: Appropriate mood and affect     PSA   Date Value Ref Range Status   01/23/2014 4.43 (H) 0 - 4 ug/L Final     Comment:     PSA results are about 7% lower than our prior method due to a methodology   change   on August 30, 2011.   09/30/2013 12.80 (H) 0 - 4 ug/L Final   09/04/2012 2.73 0 - 4 ug/L Final   08/16/2011 1.90 0 - 4 ug/L Final   07/30/2010 1.61 0 - 4 ug/L Final   07/27/2009 2.00 0 - 4 ug/L Final   06/24/2008 6.01 (H) 0 - 4 ug/L Final   06/28/2007 3.68 0 - 4 ug/L Final   08/30/2006 2.61 0 - 4 ug/L Final   01/29/2005 1.95 0 - 4 ug/L Final   11/13/2003 2.7 0 - 4 ug/L Final     Prostate Specific Antigen Screen   Date Value Ref Range Status   12/01/2020 0.1 0.0 - 6.5 ng/mL Final   05/26/2020 <0.1 0.0 - 6.5 ng/mL Final   12/02/2019 <0.1 0.0 - 6.5 ng/mL Final   06/03/2019 <0.1 0.0 - 6.5 ng/mL Final   08/21/2018 10.2 (H) 0.0 - 6.5 ng/mL Final   08/10/2017 8.4 (H) 0.0 - 6.5 ng/mL Final   09/28/2016 6.7 (H) 0.0 - 6.5 ng/mL Final   05/14/2015 4.1 0.0 - 6.5 ng/mL Final   09/23/2014 4.7 0.0 - 6.5 ng/mL Final   07/23/2014 3.8 0.0 - 6.5 ng/mL Final     PSA Tumor Marker   Date Value Ref Range Status   04/18/2022 0.11 0.00 - 6.50 ug/L Final     PSA:  <0.1 4/20/2023     Impression     The patient is a 82-year-old gentleman with a diagnosis of unfavorable intermediate risk prostate cancer status post short-term hormone therapy and a definitive external beam radiation therapy 4 years ago.  Patient has been recovering well with no clinical evidence of cancer recurrence.    Assessment & Plan:     1.  Continue pelvic floor exercises as recommended from physical therapy to improve his bladder function.  He is scheduled to return to radiation oncology in 12 months for another office follow-up and PSA.  The patient would like to alternate his follow-up for prostate cancer with urology and radiation oncology.  He is recommended to have routine follow-up  every 6 months with PSA for 5 years then yearly afterwards.     2.  Continue follow-up with Dr. Alejandro, urology and Henri PCP as planned.        Face to face time 30 minutes with > 75% spent on consultation, education and coordination of care.      Lorenza Merino MD  Department of Radiation Oncology   [facility]  Tel: [phone]  Page: [phone]    [id number] [place], [place] [id number] [place] [address]   [place] [address]    CC:  Patient Care Team:  Denton Head MD as PCP - General (Internal Medicine)  Lorenza Merino MD as Assigned Cancer Care Provider  Denton Head MD as Assigned PCP  Itz Alejandro MD as MD (Urology)

## 2023-05-03 NOTE — PROGRESS NOTES
"Oncology Rooming Note    May 3, 2023 8:46 AM   Jorge Dupree is a 82 year old male who presents for:    Chief Complaint   Patient presents with     Oncology Clinic Visit     Follow-up with Dr. Merino     Initial Vitals: /62 (BP Location: Right arm, Patient Position: Sitting, Cuff Size: Adult Regular)   Pulse 75   Temp 97.8  F (36.6  C) (Oral)   Resp 16   Wt 76.5 kg (168 lb 11.2 oz)   SpO2 99%   BMI 24.03 kg/m   Estimated body mass index is 24.03 kg/m  as calculated from the following:    Height as of 2/16/23: 1.784 m (5' 10.25\").    Weight as of this encounter: 76.5 kg (168 lb 11.2 oz). Body surface area is 1.95 meters squared.  No Pain (0) Comment: Data Unavailable   No LMP for male patient.  Allergies reviewed: Yes  Medications reviewed: Yes    Medications: Medication refills not needed today.  Pharmacy name entered into [employer]: [id number] [place] [address]    Clinical concerns: Patient here ambulatory accompanied by wife for follow-up status post radiation for his prostate cancer.  PSA done at [facility] end of April.  AUA completed.  Patient continues to have nocturia 3-4 times a night. Dr. Merino was notified.      Cami Llanes RN              "

## 2023-06-01 DIAGNOSIS — I10 ESSENTIAL HYPERTENSION, BENIGN: ICD-10-CM

## 2023-06-01 RX ORDER — AMLODIPINE BESYLATE 5 MG/1
TABLET ORAL
Qty: 90 TABLET | Refills: 3 | Status: SHIPPED | OUTPATIENT
Start: 2023-06-01 | End: 2024-05-10

## 2023-06-01 NOTE — TELEPHONE ENCOUNTER
Patient called to inquire into the status of medication refill request for amlodipine.  Chema states he has 4 tablets left. Preferred Pharmacy is the [facility] in [facility] on [facility].  Patient said thank you.

## 2023-06-01 NOTE — TELEPHONE ENCOUNTER
"Approved per [facility] RN refill protocol.    Marisela WRIGHT, RN  [facility]  Requested Prescriptions   Signed Prescriptions Disp Refills    amLODIPine (NORVASC) 5 MG tablet 90 tablet 3     Sig: TAKE 1 TABLET BY MOUTH EVERY DAY       Calcium Channel Blockers Protocol  Passed - 6/1/2023  1:40 PM        Passed - Blood pressure under 140/90 in past 12 months     BP Readings from Last 3 Encounters:   05/03/23 127/62   02/16/23 (!) 166/74   04/20/22 (!) 160/70                 Passed - Recent (12 mo) or future (30 days) visit within the authorizing provider's specialty     Patient has had an office visit with the authorizing provider or a provider within the authorizing providers department within the previous 12 mos or has a future within next 30 days. See \"Patient Info\" tab in inbasket, or \"Choose Columns\" in Meds & Orders section of the refill encounter.              Passed - Medication is active on med list        Passed - Patient is age 18 or older        Passed - Normal serum creatinine on file in past 12 months     Recent Labs   Lab Test 02/16/23  0838   CR 0.73       Ok to refill medication if creatinine is low               "

## 2023-08-11 ENCOUNTER — TELEPHONE (OUTPATIENT)
Dept: INTERNAL MEDICINE | Facility: CLINIC | Age: 83
End: 2023-08-11
Payer: MEDICARE

## 2023-08-11 NOTE — TELEPHONE ENCOUNTER
Patient calling in he bought Voltaren and has read on the package that he should not use the Voltaren on his hips or shoulders.    And wanted to know why.    RN could not find anything on the website as to why and recommended patient follow up with provider at appointment scheduled next week for clarification.    ROSEMARY Deutsch  [facility]

## 2023-08-17 ENCOUNTER — ANCILLARY PROCEDURE (OUTPATIENT)
Dept: GENERAL RADIOLOGY | Facility: CLINIC | Age: 83
End: 2023-08-17
Attending: INTERNAL MEDICINE
Payer: MEDICARE

## 2023-08-17 ENCOUNTER — OFFICE VISIT (OUTPATIENT)
Dept: INTERNAL MEDICINE | Facility: CLINIC | Age: 83
End: 2023-08-17
Payer: MEDICARE

## 2023-08-17 VITALS
HEIGHT: 70 IN | RESPIRATION RATE: 16 BRPM | SYSTOLIC BLOOD PRESSURE: 120 MMHG | HEART RATE: 70 BPM | BODY MASS INDEX: 23.45 KG/M2 | DIASTOLIC BLOOD PRESSURE: 60 MMHG | WEIGHT: 163.8 LBS | OXYGEN SATURATION: 98 %

## 2023-08-17 DIAGNOSIS — M48.062 SPINAL STENOSIS OF LUMBAR REGION WITH NEUROGENIC CLAUDICATION: Primary | ICD-10-CM

## 2023-08-17 DIAGNOSIS — Z85.46 HISTORY OF PROSTATE CANCER: ICD-10-CM

## 2023-08-17 DIAGNOSIS — N32.81 URGENCY-FREQUENCY SYNDROME: ICD-10-CM

## 2023-08-17 DIAGNOSIS — M48.062 SPINAL STENOSIS OF LUMBAR REGION WITH NEUROGENIC CLAUDICATION: ICD-10-CM

## 2023-08-17 LAB — PSA SERPL DL<=0.01 NG/ML-MCNC: 0.1 NG/ML

## 2023-08-17 PROCEDURE — 84153 ASSAY OF PSA TOTAL: CPT | Performed by: INTERNAL MEDICINE

## 2023-08-17 PROCEDURE — 36415 COLL VENOUS BLD VENIPUNCTURE: CPT | Performed by: INTERNAL MEDICINE

## 2023-08-17 PROCEDURE — 72100 X-RAY EXAM L-S SPINE 2/3 VWS: CPT | Mod: TC | Performed by: RADIOLOGY

## 2023-08-17 PROCEDURE — 73522 X-RAY EXAM HIPS BI 3-4 VIEWS: CPT | Mod: TC | Performed by: RADIOLOGY

## 2023-08-17 PROCEDURE — 99213 OFFICE O/P EST LOW 20 MIN: CPT | Performed by: INTERNAL MEDICINE

## 2023-08-17 RX ORDER — POLYETHYLENE GLYCOL 3350 17 G/17G
POWDER, FOR SOLUTION ORAL
COMMUNITY
End: 2024-07-22

## 2023-08-17 NOTE — PATIENT INSTRUCTIONS
Follow-up for multiple issues, and specifically Jorge (who comes to clinic today accompanied by his wife) wants to focus on a problem of    Chronic and progressive pain in the buttocks, hips, and both thighs, symptoms seem quite suspicious for neuro claudication of lumbar spinal stenosis, and furthermore he walks with a slightly forward flexed at the hip posture.    Last year I was working with Jorge on bilateral buttock pain which I suspected was gluteal tendinitis.  He went for physical therapy which was somewhat productive, but unfortunately the symptoms persist and has progressed to a picture that is highly suggestive of neuro claudication.  Jorge told me that if he stands for a long time, that becomes uncomfortable.    On examination, he is able to rise from seated to standing, but as he walks around the room I noticed that he is slightly forward flexed at the hip.  He actually has to remind himself to stand up straight.  Motor strength in lower extremities seems intact.    Today lets get bilateral hip and lumbar spine x-rays.  Because I am highly suspicious for lumbar spinal stenosis, I am going to refer him to the [facility] multidisciplinary spine clinic.  I told Jorge that that is staffed by spine specialist in physical medicine and rehabilitation, interventional pain, spine surgery, and physical therapy.    I also wanted to have Jorge stop by the laboratory so we can check his PSA because of his history of prostate cancer.  We wanted be sure that were not dealing with a recurrence of that.    Intermittent edema right leg and ankle, which I believe is on the basis of venous insufficiency.  He has hyperpigmentation of the medial right ankle and also a lot of spider varicosities.  I told when he probably has leaky veins in his right lower extremity, which produces a pressure column and subsequent swelling.  I suggested that Jorge try over-the-counter knee-high compression stockings.  Varicose veins lower right  leg, with a patch of venous stasis dermatitis medial right ankle, for which she has seen a dermatologist for this and prescribed some steroid cream    Essential hypertension, on amlodipine 5 mg  Target blood pressure is less than 130 systolic, and around 80 diastolic.     I reminded Jorge to control the amount of sodium in his diet, try to keep that below 3000 g/day.  He does maintain healthy weight, and stays physically active.     BP Readings from Last 6 Encounters:   08/17/23 120/60   05/03/23 127/62   02/16/23 (!) 166/74   04/20/22 (!) 160/70   04/04/22 136/70   12/07/21 138/70     2-  LDL Cholesterol Calculated <=100 mg/dL 91     Direct Measure HDL >=40 mg/dL 79     Triglycerides <150 mg/dL 32     Bladder urgency and frequency, leakage, in the context of history of prostate cancer and pelvic radiation therapy that completed in April 2019, immediate release oxybutynin does help symptoms somewhat, but he still bothered by urgency.     I recommended to Jorge that he consider implementing a program of scheduled voiding, which means emptying the bladder approximately every hour while awake on a timed schedule, rather than waiting for the urge to go  He might also adjust the amount of fluid that he ingests in the evening, to hopefully reduce somewhat the number of nighttime trips to the bathroom     Has a febrile 16 2023, Jorge has been taking oxybutynin 5 mg tablet twice a day.  I told him he could try an experiment of increasing the dose to 2 tablets rather than 1 tablet for 1 or both of the daily dosings, but to be on the look out for side effects of dry mouth, constipation, which are anticholinergic side effects     At [facility] Dr Alejandro  10- PSA 0.1     Samples of Gentesa-- nausea  Solifenicen-- expensive     Tamsulosin stopped 6/9/2021 after syncopal spell, then restarted  But he told me he cannot perceive any symptomatic benefit     oxybutynin does benefit him symptomatically.    DISEASE  TREATED: Unfavorable intermediate risk prostate cancer, clinical stage T2 cN0 M0, [place] grade 4+3=7 and 3+4 =7 involving up to 90% of biopsy tissue bilaterally, and pretherapy PSA of 10.2.  TYPE OF RADIATION THERAPY ADMINISTERED:  7942 cGy in 45 treatments given from 2/19/2019-5/2/2019     Mild normocytic anemia, resolved  2-  Hemoglobin 13.3 - 17.7 g/dL 13.9     No recurrence of syncopal spell 6/7/2021   Probably this fainting spell was because of low blood pressure. I advised him at that time to stop tamsulosin which he did     Weight stablized  His PSA has remained close to 0, so I do not think we need to worry about any recurrence of prostate cancer.     Wt Readings from Last 5 Encounters:   08/17/23 74.3 kg (163 lb 12.8 oz)   05/03/23 76.5 kg (168 lb 11.2 oz)   02/16/23 76.2 kg (168 lb)   04/20/22 76.3 kg (168 lb 3.2 oz)   12/07/21 75.5 kg (166 lb 6.4 oz)     External hemorrhoids, comes and goes, with history of small adenomatous colon polyps removed January 2015; possibly hemorrhoids relate to vascular congestion as a consequence of pelvic radiation for prostate cancer  He told me the bleeding flares up from time to time.  He is using Colace stool softener and also the osmotic laxative MiraLAX.  I reminded him to adjust the dose of MiraLAX and/or Metamucil to achieve soft stools, but not trigger diarhea      It is possible that radiation therapy exacerbated tendency for hemorrhoids, possibly by forming scar tissue that could contribute to venous congestion     February 2020, he had approximately 3 to 4 mm external hemorrhoid, with some clot inside.  I did not detect significant internal hemorrhoids.  He did have a colonoscopy performed January 2015 which disclosed 2 small adenomatous polyps that were removed.  These were less than 4 mm. Also noted was long redundant colon. Diverticulosis not noted.     Bilateral earwax, but heavier on the left side  I recommended that he purchase over-the-counter wax  dissolving eardrop, example brand Debrox or Murine, follow the package directions  Recommend that he do that perhaps once a week to keep the ears clear    Synovial cyst left shoulder, which protrudes anteriorly, and fluctuates in size.  I told that the cyst may wax and wane based on the amount of fluid inside the joint which can get resorbed, and fluid might increase if the joint is irritated for some reason.  This is benign, he still has a good range of motion, and nothing needs to be done     Actinic keratoses, working with his dermatologist.       Vaccine  Recombinnt shingles vaccine would be optional, and he could get that done at a community pharmacy, as it is paid for under the Medicare prescription drug benefit     COVID-19 Vaccine Bivalent Booster 12+ (Pfizer) 11/01/2022

## 2023-08-17 NOTE — PROGRESS NOTES
Office Visit - Follow Up   Jorge Dupree   82 year old male    Date of Visit: 8/17/2023    Chief Complaint   Patient presents with    Recheck Medication        -------------------------------------------------------------------------------------------------------------------------  Assessment and Plan    Follow-up for multiple issues, and specifically Jorge (who comes to clinic today accompanied by his wife) wants to focus on a problem of    Chronic and progressive pain in the buttocks, hips, and both thighs, symptoms seem quite suspicious for neuro claudication of lumbar spinal stenosis, and furthermore he walks with a slightly forward flexed at the hip posture.    Last year I was working with Jorge on bilateral buttock pain which I suspected was gluteal tendinitis.  He went for physical therapy which was somewhat productive, but unfortunately the symptoms persist and has progressed to a picture that is highly suggestive of neuro claudication.  Jorge told me that if he stands for a long time, that becomes uncomfortable.    On examination, he is able to rise from seated to standing, but as he walks around the room I noticed that he is slightly forward flexed at the hip.  He actually has to remind himself to stand up straight.  Motor strength in lower extremities seems intact.    Today lets get bilateral hip and lumbar spine x-rays.  Because I am highly suspicious for lumbar spinal stenosis, I am going to refer him to the [facility] multidisciplinary spine clinic.  I told Jorge that that is staffed by spine specialist in physical medicine and rehabilitation, interventional pain, spine surgery, and physical therapy.    I also wanted to have Jorge stop by the laboratory so we can check his PSA because of his history of prostate cancer.  We wanted be sure that were not dealing with a recurrence of that.    Intermittent edema right leg and ankle, which I believe is on the basis of venous insufficiency.  He has  hyperpigmentation of the medial right ankle and also a lot of spider varicosities.  I told when he probably has leaky veins in his right lower extremity, which produces a pressure column and subsequent swelling.  I suggested that Jorge try over-the-counter knee-high compression stockings.  Varicose veins lower right leg, with a patch of venous stasis dermatitis medial right ankle, for which she has seen a dermatologist for this and prescribed some steroid cream    Essential hypertension, on amlodipine 5 mg  Target blood pressure is less than 130 systolic, and around 80 diastolic.     I reminded Jorge to control the amount of sodium in his diet, try to keep that below 3000 g/day.  He does maintain healthy weight, and stays physically active.     BP Readings from Last 6 Encounters:   08/17/23 120/60   05/03/23 127/62   02/16/23 (!) 166/74   04/20/22 (!) 160/70   04/04/22 136/70   12/07/21 138/70     2-  LDL Cholesterol Calculated <=100 mg/dL 91     Direct Measure HDL >=40 mg/dL 79     Triglycerides <150 mg/dL 32     Bladder urgency and frequency, leakage, in the context of history of prostate cancer and pelvic radiation therapy that completed in April 2019, immediate release oxybutynin does help symptoms somewhat, but he still bothered by urgency.     I recommended to Jorge that he consider implementing a program of scheduled voiding, which means emptying the bladder approximately every hour while awake on a timed schedule, rather than waiting for the urge to go  He might also adjust the amount of fluid that he ingests in the evening, to hopefully reduce somewhat the number of nighttime trips to the bathroom     Has a febrile 16 2023, Jorge has been taking oxybutynin 5 mg tablet twice a day.  I told him he could try an experiment of increasing the dose to 2 tablets rather than 1 tablet for 1 or both of the daily dosings, but to be on the look out for side effects of dry mouth, constipation, which are  anticholinergic side effects     At [facility] Dr Alejandro  10- PSA 0.1     Samples of Gentesa-- nausea  Solifenicen-- expensive     Tamsulosin stopped 6/9/2021 after syncopal spell, then restarted  But he told me he cannot perceive any symptomatic benefit     oxybutynin does benefit him symptomatically.    DISEASE TREATED: Unfavorable intermediate risk prostate cancer, clinical stage T2 cN0 M0, Worthing grade 4+3=7 and 3+4 =7 involving up to 90% of biopsy tissue bilaterally, and pretherapy PSA of 10.2.  TYPE OF RADIATION THERAPY ADMINISTERED:  7942 cGy in 45 treatments given from 2/19/2019-5/2/2019     Mild normocytic anemia, resolved  2-  Hemoglobin 13.3 - 17.7 g/dL 13.9     No recurrence of syncopal spell 6/7/2021   Probably this fainting spell was because of low blood pressure. I advised him at that time to stop tamsulosin which he did     Weight stablized  His PSA has remained close to 0, so I do not think we need to worry about any recurrence of prostate cancer.     Wt Readings from Last 5 Encounters:   08/17/23 74.3 kg (163 lb 12.8 oz)   05/03/23 76.5 kg (168 lb 11.2 oz)   02/16/23 76.2 kg (168 lb)   04/20/22 76.3 kg (168 lb 3.2 oz)   12/07/21 75.5 kg (166 lb 6.4 oz)     External hemorrhoids, comes and goes, with history of small adenomatous colon polyps removed January 2015; possibly hemorrhoids relate to vascular congestion as a consequence of pelvic radiation for prostate cancer  He told me the bleeding flares up from time to time.  He is using Colace stool softener and also the osmotic laxative MiraLAX.  I reminded him to adjust the dose of MiraLAX and/or Metamucil to achieve soft stools, but not trigger diarhea      It is possible that radiation therapy exacerbated tendency for hemorrhoids, possibly by forming scar tissue that could contribute to venous congestion     February 2020, he had approximately 3 to 4 mm external hemorrhoid, with some clot inside.  I did not detect significant  internal hemorrhoids.  He did have a colonoscopy performed January 2015 which disclosed 2 small adenomatous polyps that were removed.  These were less than 4 mm. Also noted was long redundant colon. Diverticulosis not noted.     Bilateral earwax, but heavier on the left side  I recommended that he purchase over-the-counter wax dissolving eardrop, example brand Debrox or Murine, follow the package directions  Recommend that he do that perhaps once a week to keep the ears clear    Synovial cyst left shoulder, which protrudes anteriorly, and fluctuates in size.  I told that the cyst may wax and wane based on the amount of fluid inside the joint which can get resorbed, and fluid might increase if the joint is irritated for some reason.  This is benign, he still has a good range of motion, and nothing needs to be done     Actinic keratoses, working with his dermatologist.       Vaccine  Recombinnt shingles vaccine would be optional, and he could get that done at a community pharmacy, as it is paid for under the Medicare prescription drug benefit     COVID-19 Vaccine Bivalent Booster 12+ (Pfizer) 11/01/2022        --------------------------------------------------------------------------------------------------------------------------  History of Present Illness  This 82 year old old     Reason for visit:  Hip pain ankle swelling     He eats 4 or more servings of fruits and vegetables daily.He consumes 2 sweetened beverage(s) daily.He exercises with enough effort to increase his heart rate 20 to 29 minutes per day.  He exercises with enough effort to increase his heart rate 5 days per week.   He is taking medications regularly.        Wt Readings from Last 3 Encounters:   08/17/23 74.3 kg (163 lb 12.8 oz)   05/03/23 76.5 kg (168 lb 11.2 oz)   02/16/23 76.2 kg (168 lb)     BP Readings from Last 3 Encounters:   08/17/23 120/60   05/03/23 127/62   02/16/23 (!) 166/74      ---------------------------------------------------------------------------------------------------------------------------    Medications, Allergies, Social, and Problem List       Current Outpatient Medications   Medication Sig Dispense Refill    acetaminophen (TYLENOL) 500 MG tablet Take 500-1,000 mg by mouth every 6 hours as needed for mild pain      amLODIPine (NORVASC) 5 MG tablet TAKE 1 TABLET BY MOUTH EVERY DAY 90 tablet 3    docusate sodium (COLACE) 100 MG capsule Take 100 mg by mouth 2 times daily as needed for constipation      oxybutynin (DITROPAN) 5 MG tablet Take 1 tablet (5 mg) by mouth 2 times daily 180 tablet 3    triamcinolone (KENALOG) 0.1 % external cream triamcinolone acetonide 0.1 % topical cream   APPLY TO BOTH ANKLES TWICE A DAY AS NEEDED WHEN FLARING.      polyethylene glycol (MIRALAX) 17 GM/Dose powder Take 1 packet every day by oral route.      solifenacin (VESICARE) 10 MG tablet solifenacin 10 mg tablet (Patient not taking: Reported on 5/3/2023)       Allergies   Allergen Reactions    Alpha Blocker Quinazolines      rash     Social History     Tobacco Use    Smoking status: Never    Smokeless tobacco: Never   Vaping Use    Vaping Use: Never used   Substance Use Topics    Alcohol use: No     Alcohol/week: 0.0 - 1.0 standard drinks of alcohol     Comment: SELDOM TO SMALL    Drug use: No     Patient Active Problem List   Diagnosis    Essential hypertension, benign    Lumbago    Hearing loss    Hypertrophy of prostate with urinary obstruction    CARDIOVASCULAR SCREENING; LDL GOAL LESS THAN 160    Advanced directives, counseling/discussion    Urgency-frequency syndrome    Elevated prostate specific antigen (PSA)    Pain in joint, shoulder region    Biceps tendonitis    Lateral epicondylitis        Reviewed, reconciled and updated       Physical Exam   General Appearance:       /60 (BP Location: Right arm, Patient Position: Sitting, Cuff Size: Adult Regular)   Pulse 70   Resp 16    "Ht 1.784 m (5' 10.25\")   Wt 74.3 kg (163 lb 12.8 oz)   SpO2 98%   BMI 23.34 kg/m      slightly forward flexed at the hip.  He actually has to remind himself to stand up straight.  Motor strength in lower extremities seems intact.     Additional Information   I spent 20 minutes on this encounter, including reviewing interval history since last visit, examining the patient, explaining and counseling the issues enumerated in the Assessment and Plan (patient given a copy), ordering indicated tests, ordering referrals.       JUNITO TREVINO MD, MD          "

## 2023-08-31 ENCOUNTER — NURSE TRIAGE (OUTPATIENT)
Dept: NURSING | Facility: CLINIC | Age: 83
End: 2023-08-31
Payer: MEDICARE

## 2023-08-31 NOTE — TELEPHONE ENCOUNTER
It would probably not be a procedure code, because no procedures have been done.  Maybe he needs diagnosis code, and as I put in my referral request of August 17, the diagnosis code is    M48.062 (ICD-10-CM) - Spinal stenosis of lumbar region with neurogenic claudication

## 2023-08-31 NOTE — TELEPHONE ENCOUNTER
Patient returned call and gave message per Dr. Head. Patient states he will call Medicare to determine if this will be covered. He will call back for follow up.

## 2023-08-31 NOTE — TELEPHONE ENCOUNTER
Patient calling.    Reports that he was referred to a spinal specialist recently. He contacted his insurance and was told that he needed a procedure code from the provider. He also called the  spine and rehab clinic and told him that they didn't have the referral. Please advise and follow up with patient.  Okay to leave message.    Komal Fleming RN on 8/31/2023 at 11:36 AM     Reason for Disposition   Nursing judgment    Protocols used: Information Only Call - No Triage-A-[place]

## 2023-09-27 ENCOUNTER — TELEPHONE (OUTPATIENT)
Dept: PHYSICAL MEDICINE AND REHAB | Facility: CLINIC | Age: 83
End: 2023-09-27
Payer: MEDICARE

## 2023-09-27 NOTE — TELEPHONE ENCOUNTER
Phone call to patient to discuss new patient evaluation appointment and what to anticipate. Stated understanding and appreciation for call back.     Confirmed date and time with pt. Directions to clinic also given.

## 2023-09-27 NOTE — TELEPHONE ENCOUNTER
[facility] Call Center    Phone Message    May a detailed message be left on voicemail: no     Reason for Call: Requesting c/b from a nurse- he would like to know what to expect w/his appt on 10/05 w/Dr Mcgrath.

## 2023-10-05 ENCOUNTER — OFFICE VISIT (OUTPATIENT)
Dept: PHYSICAL MEDICINE AND REHAB | Facility: CLINIC | Age: 83
End: 2023-10-05
Attending: INTERNAL MEDICINE
Payer: MEDICARE

## 2023-10-05 VITALS
HEART RATE: 75 BPM | WEIGHT: 166 LBS | BODY MASS INDEX: 23.65 KG/M2 | SYSTOLIC BLOOD PRESSURE: 143 MMHG | DIASTOLIC BLOOD PRESSURE: 70 MMHG

## 2023-10-05 DIAGNOSIS — M16.0 PRIMARY OSTEOARTHRITIS OF BOTH HIPS: ICD-10-CM

## 2023-10-05 DIAGNOSIS — M25.562 CHRONIC PAIN OF BOTH KNEES: ICD-10-CM

## 2023-10-05 DIAGNOSIS — M25.561 CHRONIC PAIN OF BOTH KNEES: ICD-10-CM

## 2023-10-05 DIAGNOSIS — M25.651 DECREASED RANGE OF MOTION OF BOTH HIPS: ICD-10-CM

## 2023-10-05 DIAGNOSIS — M43.8X9 SAGITTAL PLANE IMBALANCE: ICD-10-CM

## 2023-10-05 DIAGNOSIS — M79.18 GLUTEAL PAIN: Primary | ICD-10-CM

## 2023-10-05 DIAGNOSIS — M25.652 DECREASED RANGE OF MOTION OF BOTH HIPS: ICD-10-CM

## 2023-10-05 DIAGNOSIS — G89.29 CHRONIC PAIN OF BOTH KNEES: ICD-10-CM

## 2023-10-05 PROCEDURE — 99204 OFFICE O/P NEW MOD 45 MIN: CPT | Performed by: PHYSICAL MEDICINE & REHABILITATION

## 2023-10-05 ASSESSMENT — PAIN SCALES - GENERAL: PAINLEVEL: MILD PAIN (2)

## 2023-10-05 NOTE — LETTER
10/5/2023         RE: Jorge Dupree  [address]  [place] [address]        Dear Colleague,    Thank you for referring your patient, Jorge Dupree, to the [facility]. Please see a copy of my visit note below.    Assessment/Plan:      Jorge was seen today for back pain.    Diagnoses and all orders for this visit:    Gluteal pain  -     Physical Therapy Referral; Future    Primary osteoarthritis of both hips  -     Physical Therapy Referral; Future    Decreased range of motion of both hips  -     XR Knee Bilateral 3 Views; Future  -     Physical Therapy Referral; Future    Chronic pain of both knees  -     Physical Therapy Referral; Future    Sagittal plane imbalance  -     Physical Therapy Referral; Future    Other orders  -     Spine  Referral         Assessment: Pleasant 83 year old male with a history of prostate cancer, hypertension with:    1.  Chronic bilateral gluteal pain for the past couple of years lateral gluteal region.  Likely multifactorial.  He has moderate hip osteoarthritis on plain films along with mild degenerative changes of the lumbar spine which results in what appears to be sagittal plane imbalance on exam.  Has decreased range of motion of the hips as well which contribute   Resulting in significant myofascial pain in the gluteal region.    2.  Chronic intermittent bilateral knee pain likely related to his hip pain and sagittal plane imbalance as well although cannot exclude knee osteoarthritis.      Discussion:    1.  I discussed the options with the patient and his wife.  This includes further evaluation with diagnostics, physical therapy, medications.  He is not interested in any medications at this time such as muscle relaxers.    2.  Physical therapy for lumbar and hip range of motion strengthening stabilization.    3.  Plain films of the knees to evaluate the extent of osteoarthritis.    4.  Continue with Tylenol as needed up to 1000 mg 3 times  daily.    5.  Voltaren gel on his knees as needed.    6.  Follow-up with me in 2 to 3 months.      It was our pleasure caring for your patient today, if there any questions or concerns please do not hesitate to contact us.      Subjective:   Patient ID: Jorge Dupree is a 83 year old male.    History of Present Illness: Patient presents for an evaluation of bilateral gluteal pain at the request of Dr. Heda.  Patient presents with pain over the past 2 to 3 years slowly worsening over time.  Points to the bilateral gluteal region laterally of the gluteus medius as well as static notch.  No radiation down the legs paresthesias or weakness.  This pain has become constant worse with any standing and walking or transition from sitting to standing.  He has improved pain with sitting as well as with Tylenol.  He intermittently will have anterior knee pain bilaterally.  Has had plain films lumbar spine and hips.  No physical therapy or chiropractor.  Pain is a 3/10 at worst 2/10 today 1/10 at best.      Imaging: Plain films of the hips personally reviewed along with report showing mild to moderate osteoarthritis of the knees.    Plain films of the lumbar spine was personally reviewed revealing mild to moderate disc height loss L5-S1 moderate facet arthropathy L4-5 L5-S1.  No fractures.  This is from August 2023.       Review of Systems: Patient complains of weight loss, swelling in feet, joint pain, muscle pain excessive tiredness.  Denies fever, headache, change in vision, chest pain, shortness of breath, abdominal pain, nausea vomiting, bowel or bladder incontinence, skin issues, balance issues. Remainder of 12 point review systems negative unless listed above.    Past Medical History:   Diagnosis Date     BPH (benign prostatic hypertrophy)      Essential hypertension, benign      Hypertension      Malignant neoplasm of prostate (H) 1/16/2019     Medical history non-contributory      Prostate cancer (H)        Family History    Problem Relation Age of Onset     Heart Disease Father      Alzheimer Disease Mother         ALSO HAD CANCER     Cancer Mother      Cancer Maternal Aunt          Social History     Socioeconomic History     Marital status:      Spouse name: None     Number of children: 2     Years of education: None     Highest education level: None   Tobacco Use     Smoking status: Never     Smokeless tobacco: Never   Vaping Use     Vaping Use: Never used   Substance and Sexual Activity     Alcohol use: No     Alcohol/week: 0.0 - 1.0 standard drinks of alcohol     Comment: SELDOM TO SMALL     Drug use: No     Social history: .  No tobacco or alcohol.  Retired from [employer].    The following portions of the patient's history were reviewed and updated as appropriate: allergies, current medications, past family history, past medical history, past social history, past surgical history and problem list.    Oswestry (KEHINDE) Questionnaire         No data to display                Neck Disability Index:       No data to display                   PHQ-2 Score:         2/16/2023     7:37 AM 2/16/2023     7:30 AM   PHQ-2 ( 1999 Pfizer)   Q1: Little interest or pleasure in doing things 0 0   Q2: Feeling down, depressed or hopeless 0 0   PHQ-2 Score 0 0   Q1: Little interest or pleasure in doing things  Not at all   Q2: Feeling down, depressed or hopeless  Not at all   PHQ-2 Score  0                  Objective:   Physical Exam:    BP (!) 143/70   Pulse 75   Wt 166 lb (75.3 kg)   BMI 23.65 kg/m    Body mass index is 23.65 kg/m .      General:  Well-appearing male in no acute distress.  Pleasant, cooperative, and interactive throughout the examination and interview.  CV: No lower extremity edema on inspection or paltation.  Lymphatics: No cervical lymphadenopathy palpated. Eyes: sclera clear. Skin: No rashes or lesions seen over the head/neck, hairline, arms, legs, trunk.  Respirations unlabored.  MSK: Gait is  nonantalgic.  Anterior sagittal lean.  Able to heel-toe stand without difficulty.  Negative Romberg.  Spine: normal AP curves of the C, T, and L spine.  Reduced lumbar flexion finger to floor testing..  Palpation: Tenderness to palpation over bilateral gluteal tissues gluteus medius.  No tenderness greater trochanters.  Extremities: Full range of motion of the elbows, and wrists with no effusions or tenderness to palpation.   Significantly reduced range of motion bilateral hips and supine exam mildly in flexion significantly and internal and external rotation.  Unable to place him in Nena position.  Full range of motion of the   knees, and ankles with  no effusions and tenderness over the medial knees.  No hypermobility of the upper or lower extremities.  Neurologic exam: Mental status: Patient is alert and oriented with normal affect.  Attention, knowledge, memory, and language are intact.  Normal coordination throughout the examination.  Reflexes are 2+ and symmetric biceps, triceps, brachioradialis, 1+ patellar, and 0 Achilles with down-going toes and Negative Millie's.  Sensation is intact to light touch throughout the upper and lower extremities bilaterally.  Manual muscle testing reveals 5 out of 5 in the hip flexors, knee flexors/extensors, ankle plantar flexors, ankle  dorsiflexors, and EHL.  Upper extremities: Grossly normal strength . Normal muscle bulk and tone in the arms and legs.    Negative seated and supine straight leg raise bilaterally.        Again, thank you for allowing me to participate in the care of your patient.        Sincerely,        Bryan Mcgrath, DO

## 2023-10-05 NOTE — PROGRESS NOTES
Assessment/Plan:      Jorge was seen today for back pain.    Diagnoses and all orders for this visit:    Gluteal pain  -     Physical Therapy Referral; Future    Primary osteoarthritis of both hips  -     Physical Therapy Referral; Future    Decreased range of motion of both hips  -     XR Knee Bilateral 3 Views; Future  -     Physical Therapy Referral; Future    Chronic pain of both knees  -     Physical Therapy Referral; Future    Sagittal plane imbalance  -     Physical Therapy Referral; Future    Other orders  -     Spine  Referral         Assessment: Pleasant 83 year old male with a history of prostate cancer, hypertension with:    1.  Chronic bilateral gluteal pain for the past couple of years lateral gluteal region.  Likely multifactorial.  He has moderate hip osteoarthritis on plain films along with mild degenerative changes of the lumbar spine which results in what appears to be sagittal plane imbalance on exam.  Has decreased range of motion of the hips as well which contribute   Resulting in significant myofascial pain in the gluteal region.    2.  Chronic intermittent bilateral knee pain likely related to his hip pain and sagittal plane imbalance as well although cannot exclude knee osteoarthritis.      Discussion:    1.  I discussed the options with the patient and his wife.  This includes further evaluation with diagnostics, physical therapy, medications.  He is not interested in any medications at this time such as muscle relaxers.    2.  Physical therapy for lumbar and hip range of motion strengthening stabilization.    3.  Plain films of the knees to evaluate the extent of osteoarthritis.    4.  Continue with Tylenol as needed up to 1000 mg 3 times daily.    5.  Voltaren gel on his knees as needed.    6.  Follow-up with me in 2 to 3 months.      It was our pleasure caring for your patient today, if there any questions or concerns please do not hesitate to contact us.      Subjective:    Patient ID: Jorge Dupree is a 83 year old male.    History of Present Illness: Patient presents for an evaluation of bilateral gluteal pain at the request of Dr. Head.  Patient presents with pain over the past 2 to 3 years slowly worsening over time.  Points to the bilateral gluteal region laterally of the gluteus medius as well as static notch.  No radiation down the legs paresthesias or weakness.  This pain has become constant worse with any standing and walking or transition from sitting to standing.  He has improved pain with sitting as well as with Tylenol.  He intermittently will have anterior knee pain bilaterally.  Has had plain films lumbar spine and hips.  No physical therapy or chiropractor.  Pain is a 3/10 at worst 2/10 today 1/10 at best.      Imaging: Plain films of the hips personally reviewed along with report showing mild to moderate osteoarthritis of the knees.    Plain films of the lumbar spine was personally reviewed revealing mild to moderate disc height loss L5-S1 moderate facet arthropathy L4-5 L5-S1.  No fractures.  This is from August 2023.       Review of Systems: Patient complains of weight loss, swelling in feet, joint pain, muscle pain excessive tiredness.  Denies fever, headache, change in vision, chest pain, shortness of breath, abdominal pain, nausea vomiting, bowel or bladder incontinence, skin issues, balance issues. Remainder of 12 point review systems negative unless listed above.    Past Medical History:   Diagnosis Date    BPH (benign prostatic hypertrophy)     Essential hypertension, benign     Hypertension     Malignant neoplasm of prostate (H) 1/16/2019    Medical history non-contributory     Prostate cancer (H)        Family History   Problem Relation Age of Onset    Heart Disease Father     Alzheimer Disease Mother         ALSO HAD CANCER    Cancer Mother     Cancer Maternal Aunt          Social History     Socioeconomic History    Marital status:      Spouse name:  None    Number of children: 2    Years of education: None    Highest education level: None   Tobacco Use    Smoking status: Never    Smokeless tobacco: Never   Vaping Use    Vaping Use: Never used   Substance and Sexual Activity    Alcohol use: No     Alcohol/week: 0.0 - 1.0 standard drinks of alcohol     Comment: SELDOM TO SMALL    Drug use: No     Social history: .  No tobacco or alcohol.  Retired from [employer].    The following portions of the patient's history were reviewed and updated as appropriate: allergies, current medications, past family history, past medical history, past social history, past surgical history and problem list.    Oswestry (KEHINDE) Questionnaire         No data to display                Neck Disability Index:       No data to display                   PHQ-2 Score:         2/16/2023     7:37 AM 2/16/2023     7:30 AM   PHQ-2 ( 1999 [employer])   Q1: Little interest or pleasure in doing things 0 0   Q2: Feeling down, depressed or hopeless 0 0   PHQ-2 Score 0 0   Q1: Little interest or pleasure in doing things  Not at all   Q2: Feeling down, depressed or hopeless  Not at all   PHQ-2 Score  0                  Objective:   Physical Exam:    BP (!) 143/70   Pulse 75   Wt 166 lb (75.3 kg)   BMI 23.65 kg/m    Body mass index is 23.65 kg/m .      General:  Well-appearing male in no acute distress.  Pleasant, cooperative, and interactive throughout the examination and interview.  CV: No lower extremity edema on inspection or paltation.  Lymphatics: No cervical lymphadenopathy palpated. Eyes: sclera clear. Skin: No rashes or lesions seen over the head/neck, hairline, arms, legs, trunk.  Respirations unlabored.  MSK: Gait is nonantalgic.  Anterior sagittal lean.  Able to heel-toe stand without difficulty.  Negative Romberg.  Spine: normal AP curves of the C, T, and L spine.  Reduced lumbar flexion finger to floor testing..  Palpation: Tenderness to palpation over bilateral gluteal tissues  gluteus medius.  No tenderness greater trochanters.  Extremities: Full range of motion of the elbows, and wrists with no effusions or tenderness to palpation.   Significantly reduced range of motion bilateral hips and supine exam mildly in flexion significantly and internal and external rotation.  Unable to place him in Nena position.  Full range of motion of the   knees, and ankles with  no effusions and tenderness over the medial knees.  No hypermobility of the upper or lower extremities.  Neurologic exam: Mental status: Patient is alert and oriented with normal affect.  Attention, knowledge, memory, and language are intact.  Normal coordination throughout the examination.  Reflexes are 2+ and symmetric biceps, triceps, brachioradialis, 1+ patellar, and 0 Achilles with down-going toes and Negative Millie's.  Sensation is intact to light touch throughout the upper and lower extremities bilaterally.  Manual muscle testing reveals 5 out of 5 in the hip flexors, knee flexors/extensors, ankle plantar flexors, ankle  dorsiflexors, and EHL.  Upper extremities: Grossly normal strength . Normal muscle bulk and tone in the arms and legs.    Negative seated and supine straight leg raise bilaterally.

## 2023-10-05 NOTE — PATIENT INSTRUCTIONS
A physical therapy order was provided for you today.  You will be contacted by physical therapy.  If nobody contacts you within 3 to 5 days, please contact the clinic at [phone].  It will be very important for you to do your physical therapy exercises on a regular basis to decrease your pain and prevent future pain flares.   An xray was ordered for you today. Please call Radiology at [phone].    Continue with tylenol as needed (up to 1000mg three times daily)  Continue with voltaren trial for your knees intermittently

## 2023-10-06 ENCOUNTER — HOSPITAL ENCOUNTER (OUTPATIENT)
Dept: RADIOLOGY | Facility: CLINIC | Age: 83
Discharge: HOME | End: 2023-10-06
Attending: PHYSICAL MEDICINE & REHABILITATION | Admitting: PHYSICAL MEDICINE & REHABILITATION
Payer: MEDICARE

## 2023-10-06 DIAGNOSIS — M25.651 DECREASED RANGE OF MOTION OF BOTH HIPS: ICD-10-CM

## 2023-10-06 DIAGNOSIS — M25.652 DECREASED RANGE OF MOTION OF BOTH HIPS: ICD-10-CM

## 2023-10-06 PROCEDURE — 73562 X-RAY EXAM OF KNEE 3: CPT | Mod: 50

## 2023-10-09 ENCOUNTER — THERAPY VISIT (OUTPATIENT)
Dept: PHYSICAL THERAPY | Facility: REHABILITATION | Age: 83
End: 2023-10-09
Attending: PHYSICAL MEDICINE & REHABILITATION
Payer: MEDICARE

## 2023-10-09 DIAGNOSIS — M16.0 PRIMARY OSTEOARTHRITIS OF BOTH HIPS: ICD-10-CM

## 2023-10-09 DIAGNOSIS — M25.562 CHRONIC PAIN OF BOTH KNEES: ICD-10-CM

## 2023-10-09 DIAGNOSIS — M79.18 GLUTEAL PAIN: ICD-10-CM

## 2023-10-09 DIAGNOSIS — M43.8X9 SAGITTAL PLANE IMBALANCE: ICD-10-CM

## 2023-10-09 DIAGNOSIS — M25.652 DECREASED RANGE OF MOTION OF BOTH HIPS: ICD-10-CM

## 2023-10-09 DIAGNOSIS — G89.29 CHRONIC PAIN OF BOTH KNEES: ICD-10-CM

## 2023-10-09 DIAGNOSIS — M25.561 CHRONIC PAIN OF BOTH KNEES: ICD-10-CM

## 2023-10-09 DIAGNOSIS — M25.651 DECREASED RANGE OF MOTION OF BOTH HIPS: ICD-10-CM

## 2023-10-09 PROCEDURE — 97161 PT EVAL LOW COMPLEX 20 MIN: CPT | Mod: GP | Performed by: PHYSICAL THERAPIST

## 2023-10-09 PROCEDURE — 97110 THERAPEUTIC EXERCISES: CPT | Mod: GP | Performed by: PHYSICAL THERAPIST

## 2023-10-09 NOTE — PROGRESS NOTES
PHYSICAL THERAPY EVALUATION  Type of Visit: Evaluation    See electronic medical record for Abuse and Falls Screening details.    Onesimo Patel is a pleasant 84 y/o male with complaint of bilateral gluteal pain, bilateral hip OA, and chronic knee pain. He reports difficulty with transfers, walking, standing, and stairs.   Presenting condition or subjective complaint: hip pain  Date of onset: 10/05/23    Relevant medical history: Arthritis; Cancer; High blood pressure; Osteoarthritis; Radiation treatment   Dates & types of surgery:      Prior diagnostic imaging/testing results: X-ray     Prior therapy history for the same diagnosis, illness or injury:        Prior Level of Function  Transfers: Independent  Ambulation: Independent  ADL: Independent    Living Environment  Social support: With a significant other or spouse   Type of home: House   Stairs to enter the home: Yes 12 Is there a railing: Yes   Ramp: No   Stairs inside the home:         Help at home: None  Equipment owned:       Employment: No    Hobbies/Interests:      Patient goals for therapy:      Pain assessment: Pain present  Location: bilateral hips/gluteals/Ratin-5/10  Location: bilateral knees/Ratin-5/10     Objective   HIP EVALUATION  POSTURE: Standing Posture: Rounded shoulders, Forward head, Thoracic kyphosis increased, flexed at waist and knees  Sitting Posture: Rounded shoulders, Forward head, Thoracic kyphosis increased  GAIT:   Weightbearing Status: WBAT  Assistive Device(s): None  Gait Deviations: Antalgic  flexed  BALANCE/PROPRIOCEPTION:   WEIGHTBEARING ALIGNMENT:   NON-WEIGHTBEARING ALIGNMENT:    ROM:   (Degrees) Left AROM Left PROM  Right AROM Right PROM   Hip Flexion  Mod limitation  Mod limitation   Hip Extension  Severe limitation  Severe limitation   Hip Abduction  Mod limitation  Mod limitation   Hip Adduction  Mod limitation  Mod limitation   Hip Internal Rotation  Severe limitation  Severe limitation   Hip External  Rotation  Mod limitation  Mod limitation   Knee Flexion       Knee Extension Min limitation  Min limitation    Lumbar Side glide     Lumbar Flexion    Lumbar Extension    Pain:   End feel:     PELVIC/SI SCREEN:   STRENGTH:   Pain: - none + mild ++ moderate +++ severe  Strength Scale: 0-5/5 Left Right   Hip Flexion 4 4   Hip Extension 4 4   Hip Abduction 4- 4-   Hip Adduction 4- 4-   Hip Internal Rotation     Hip External Rotation     Knee Flexion 4+ 4+   Knee Extension 5 5     LE FLEXIBILITY: Decreased hip flexors L, Decreased quadriceps L, Decreased hamstrings L, Decreased hip flexors R, Decreased quadriceps R, Decreased hamstrings R  SPECIAL TESTS:   FUNCTIONAL TESTS:   PALPATION:     Assessment & Plan   CLINICAL IMPRESSIONS  Medical Diagnosis: Gluteal pain; Primary osteoarthritis of both hips; Decreased range of motion of both hips; Chronic pain of both knees; Sagittal plane imbalance    Treatment Diagnosis: Gluteal pain; Primary osteoarthritis of both hips; Decreased range of motion of both hips; Chronic pain of both knees; Sagittal plane imbalance   Impression/Assessment: Patient is a 83 year old male with bilateral hip and knee pain  complaints.  The following significant findings have been identified: Pain, Decreased ROM/flexibility, Decreased joint mobility, Decreased strength, Impaired balance, Decreased proprioception, Impaired gait, Impaired muscle performance, Decreased activity tolerance, and Impaired posture. These impairments interfere with their ability to perform self care tasks, work tasks, recreational activities, household chores, household mobility, and community mobility as compared to previous level of function.     Clinical Decision Making (Complexity):  Clinical Presentation: Stable/Uncomplicated  Clinical Presentation Rationale: based on medical and personal factors listed in PT evaluation  Clinical Decision Making (Complexity): Low complexity    PLAN OF CARE  Treatment  Interventions:  Interventions: Gait Training, Manual Therapy, Neuromuscular Re-education, Therapeutic Activity, Therapeutic Exercise, Self-Care/Home Management    Long Term Goals     PT Goal 1  Goal Identifier: HEP  Goal Description: Patient will be independent with HEP and self management of symptoms  Rationale: to maximize safety and independence with performance of ADLs and functional tasks;to maximize safety and independence within the home  Goal Progress: initial  Target Date: 12/08/23  PT Goal 2  Goal Identifier: transfers  Goal Description: Patient will perform sit to stand transfers from a standard height chair without UE and with pain 3/10 or less  Rationale: to maximize safety and independence with performance of ADLs and functional tasks  Goal Progress: initial  Target Date: 12/08/23  PT Goal 3  Goal Identifier: walking  Goal Description: Patient will return to walking for exercise with hip and knee pain 3/10 or less.  Rationale: to maximize safety and independence with performance of ADLs and functional tasks;to maximize safety and independence within the community  Goal Progress: initial  Target Date: 12/08/23  PT Goal 4  Goal Identifier: stairs  Goal Description: Patient will ascend/descend stairs with handrail and pain 3/10 or less.  Rationale: to maximize safety and independence with performance of ADLs and functional tasks;to maximize safety and independence within the home  Goal Progress: initial  Target Date: 12/08/23      Frequency of Treatment: 2-1x/week  Duration of Treatment: 60 days    Recommended Referrals to Other Professionals: not at this time  Education Assessment:   Learner/Method: Patient;Significant Other (significant other took her own notes on today's evaluation and treatment.)    Risks and benefits of evaluation/treatment have been explained.   Patient/Family/caregiver agrees with Plan of Care.     Evaluation Time:     PT Eval, Low Complexity Minutes (19702): 30       Signing  Clinician: Zack Isaac, PT      [facility]                                                                                   OUTPATIENT PHYSICAL THERAPY      PLAN OF TREATMENT FOR OUTPATIENT REHABILITATION   Patient's Last Name, First Name, Jorge Kearney YOB: 1940   Provider's Name   [facility]   Medical Record No.  [id number]     Onset Date: 10/05/23  Start of Care Date: 10/09/23     Medical Diagnosis:  Gluteal pain; Primary osteoarthritis of both hips; Decreased range of motion of both hips; Chronic pain of both knees; Sagittal plane imbalance      PT Treatment Diagnosis:  Gluteal pain; Primary osteoarthritis of both hips; Decreased range of motion of both hips; Chronic pain of both knees; Sagittal plane imbalance Plan of Treatment  Frequency/Duration: 2-1x/week/ 60 days    Certification date from 10/09/23 to 12/08/23         See note for plan of treatment details and functional goals     Zack Isaac, PT                         I CERTIFY THE NEED FOR THESE SERVICES FURNISHED UNDER        THIS PLAN OF TREATMENT AND WHILE UNDER MY CARE     (Physician attestation of this document indicates review and certification of the therapy plan).                Referring Provider:  Bryan Mcgrath      Initial Assessment  See Epic Evaluation- Start of Care Date: 10/09/23

## 2023-10-19 ENCOUNTER — TRANSFERRED RECORDS (OUTPATIENT)
Dept: HEALTH INFORMATION MANAGEMENT | Facility: CLINIC | Age: 83
End: 2023-10-19

## 2023-10-24 ENCOUNTER — THERAPY VISIT (OUTPATIENT)
Dept: PHYSICAL THERAPY | Facility: REHABILITATION | Age: 83
End: 2023-10-24
Payer: MEDICARE

## 2023-10-24 DIAGNOSIS — M25.561 CHRONIC PAIN OF BOTH KNEES: ICD-10-CM

## 2023-10-24 DIAGNOSIS — G89.29 CHRONIC PAIN OF BOTH KNEES: ICD-10-CM

## 2023-10-24 DIAGNOSIS — M16.0 PRIMARY OSTEOARTHRITIS OF BOTH HIPS: ICD-10-CM

## 2023-10-24 DIAGNOSIS — M25.562 CHRONIC PAIN OF BOTH KNEES: ICD-10-CM

## 2023-10-24 DIAGNOSIS — M43.8X9 SAGITTAL PLANE IMBALANCE: ICD-10-CM

## 2023-10-24 DIAGNOSIS — M79.18 GLUTEAL PAIN: Primary | ICD-10-CM

## 2023-10-24 DIAGNOSIS — M25.652 DECREASED RANGE OF MOTION OF BOTH HIPS: ICD-10-CM

## 2023-10-24 DIAGNOSIS — M25.651 DECREASED RANGE OF MOTION OF BOTH HIPS: ICD-10-CM

## 2023-10-24 PROCEDURE — 97110 THERAPEUTIC EXERCISES: CPT | Mod: GP | Performed by: PHYSICAL THERAPIST

## 2023-10-31 ENCOUNTER — THERAPY VISIT (OUTPATIENT)
Dept: PHYSICAL THERAPY | Facility: REHABILITATION | Age: 83
End: 2023-10-31
Payer: MEDICARE

## 2023-10-31 DIAGNOSIS — M25.562 CHRONIC PAIN OF BOTH KNEES: ICD-10-CM

## 2023-10-31 DIAGNOSIS — M16.0 PRIMARY OSTEOARTHRITIS OF BOTH HIPS: ICD-10-CM

## 2023-10-31 DIAGNOSIS — M43.8X9 SAGITTAL PLANE IMBALANCE: ICD-10-CM

## 2023-10-31 DIAGNOSIS — M79.18 GLUTEAL PAIN: Primary | ICD-10-CM

## 2023-10-31 DIAGNOSIS — M25.561 CHRONIC PAIN OF BOTH KNEES: ICD-10-CM

## 2023-10-31 DIAGNOSIS — M25.651 DECREASED RANGE OF MOTION OF BOTH HIPS: ICD-10-CM

## 2023-10-31 DIAGNOSIS — G89.29 CHRONIC PAIN OF BOTH KNEES: ICD-10-CM

## 2023-10-31 DIAGNOSIS — M25.652 DECREASED RANGE OF MOTION OF BOTH HIPS: ICD-10-CM

## 2023-10-31 PROCEDURE — 97110 THERAPEUTIC EXERCISES: CPT | Mod: GP | Performed by: PHYSICAL THERAPIST

## 2023-11-07 ENCOUNTER — THERAPY VISIT (OUTPATIENT)
Dept: PHYSICAL THERAPY | Facility: REHABILITATION | Age: 83
End: 2023-11-07
Payer: MEDICARE

## 2023-11-07 DIAGNOSIS — M25.562 CHRONIC PAIN OF BOTH KNEES: ICD-10-CM

## 2023-11-07 DIAGNOSIS — M25.651 DECREASED RANGE OF MOTION OF BOTH HIPS: ICD-10-CM

## 2023-11-07 DIAGNOSIS — M16.0 PRIMARY OSTEOARTHRITIS OF BOTH HIPS: ICD-10-CM

## 2023-11-07 DIAGNOSIS — G89.29 CHRONIC PAIN OF BOTH KNEES: ICD-10-CM

## 2023-11-07 DIAGNOSIS — M25.652 DECREASED RANGE OF MOTION OF BOTH HIPS: ICD-10-CM

## 2023-11-07 DIAGNOSIS — M79.18 GLUTEAL PAIN: Primary | ICD-10-CM

## 2023-11-07 DIAGNOSIS — M43.8X9 SAGITTAL PLANE IMBALANCE: ICD-10-CM

## 2023-11-07 DIAGNOSIS — M25.561 CHRONIC PAIN OF BOTH KNEES: ICD-10-CM

## 2023-11-07 PROCEDURE — 97110 THERAPEUTIC EXERCISES: CPT | Mod: GP | Performed by: PHYSICAL THERAPIST

## 2023-11-14 ENCOUNTER — THERAPY VISIT (OUTPATIENT)
Dept: PHYSICAL THERAPY | Facility: REHABILITATION | Age: 83
End: 2023-11-14
Payer: MEDICARE

## 2023-11-14 DIAGNOSIS — M25.562 CHRONIC PAIN OF BOTH KNEES: ICD-10-CM

## 2023-11-14 DIAGNOSIS — M16.0 PRIMARY OSTEOARTHRITIS OF BOTH HIPS: ICD-10-CM

## 2023-11-14 DIAGNOSIS — M43.8X9 SAGITTAL PLANE IMBALANCE: ICD-10-CM

## 2023-11-14 DIAGNOSIS — G89.29 CHRONIC PAIN OF BOTH KNEES: ICD-10-CM

## 2023-11-14 DIAGNOSIS — M25.652 DECREASED RANGE OF MOTION OF BOTH HIPS: ICD-10-CM

## 2023-11-14 DIAGNOSIS — M79.18 GLUTEAL PAIN: Primary | ICD-10-CM

## 2023-11-14 DIAGNOSIS — M25.651 DECREASED RANGE OF MOTION OF BOTH HIPS: ICD-10-CM

## 2023-11-14 DIAGNOSIS — M25.561 CHRONIC PAIN OF BOTH KNEES: ICD-10-CM

## 2023-11-14 PROCEDURE — 97110 THERAPEUTIC EXERCISES: CPT | Mod: GP | Performed by: PHYSICAL THERAPIST

## 2023-12-01 ENCOUNTER — OFFICE VISIT (OUTPATIENT)
Dept: INTERNAL MEDICINE | Facility: CLINIC | Age: 83
End: 2023-12-01
Payer: MEDICARE

## 2023-12-01 VITALS
DIASTOLIC BLOOD PRESSURE: 62 MMHG | BODY MASS INDEX: 23.91 KG/M2 | OXYGEN SATURATION: 99 % | RESPIRATION RATE: 16 BRPM | TEMPERATURE: 98.6 F | SYSTOLIC BLOOD PRESSURE: 128 MMHG | HEART RATE: 72 BPM | WEIGHT: 167 LBS | HEIGHT: 70 IN

## 2023-12-01 DIAGNOSIS — Z85.46 HISTORY OF PROSTATE CANCER: ICD-10-CM

## 2023-12-01 DIAGNOSIS — Z13.1 SCREENING FOR DIABETES MELLITUS: ICD-10-CM

## 2023-12-01 DIAGNOSIS — M48.062 SPINAL STENOSIS OF LUMBAR REGION WITH NEUROGENIC CLAUDICATION: ICD-10-CM

## 2023-12-01 DIAGNOSIS — I10 ESSENTIAL HYPERTENSION, BENIGN: Primary | ICD-10-CM

## 2023-12-01 DIAGNOSIS — N32.81 URGENCY-FREQUENCY SYNDROME: ICD-10-CM

## 2023-12-01 LAB
ANION GAP SERPL CALCULATED.3IONS-SCNC: 10 MMOL/L (ref 7–15)
BUN SERPL-MCNC: 19.4 MG/DL (ref 8–23)
CALCIUM SERPL-MCNC: 9 MG/DL (ref 8.8–10.2)
CHLORIDE SERPL-SCNC: 106 MMOL/L (ref 98–107)
CREAT SERPL-MCNC: 0.67 MG/DL (ref 0.67–1.17)
DEPRECATED HCO3 PLAS-SCNC: 24 MMOL/L (ref 22–29)
EGFRCR SERPLBLD CKD-EPI 2021: >90 ML/MIN/1.73M2
GLUCOSE SERPL-MCNC: 103 MG/DL (ref 70–99)
HBA1C MFR BLD: 5.8 % (ref 0–5.6)
POTASSIUM SERPL-SCNC: 4.1 MMOL/L (ref 3.4–5.3)
SODIUM SERPL-SCNC: 140 MMOL/L (ref 135–145)

## 2023-12-01 PROCEDURE — 83036 HEMOGLOBIN GLYCOSYLATED A1C: CPT | Mod: GZ | Performed by: INTERNAL MEDICINE

## 2023-12-01 PROCEDURE — 80048 BASIC METABOLIC PNL TOTAL CA: CPT | Performed by: INTERNAL MEDICINE

## 2023-12-01 PROCEDURE — 36415 COLL VENOUS BLD VENIPUNCTURE: CPT | Performed by: INTERNAL MEDICINE

## 2023-12-01 PROCEDURE — 99214 OFFICE O/P EST MOD 30 MIN: CPT | Performed by: INTERNAL MEDICINE

## 2023-12-01 RX ORDER — TAMSULOSIN HYDROCHLORIDE 0.4 MG/1
0.4 CAPSULE ORAL DAILY
COMMUNITY
End: 2024-07-22

## 2023-12-01 NOTE — PROGRESS NOTES
Office Visit - Follow Up   Jorge Dupree   83 year old male    Date of Visit: 12/1/2023    Chief Complaint   Patient presents with    Hypertension     fasting        -------------------------------------------------------------------------------------------------------------------------  Assessment and Plan    Follow-up for multiple issues, and specifically Jorge (who comes to clinic today accompanied by his wife) wants to focus on a problem of    Nice result from physical therapy which Jorge completed mid November 2023 to address gluteal pain and lumbar stenosis.  I reminded Jorge of the importance of doing the daily exercises prescribed by the physical therapist.     Will have Jorge swing by the laboratory this morning to recheck his blood sugar with a basic metabolic panel, and also check A1c to screen for diabetes.  I reviewed with gland the blood test he had done early 2023 which all look great including the lipid panel, blood cell counts, TSH thyroid test, and comprehensive metabolic panel--except for slight glucose elevation of 109.    Jorge already received his seasonal flu shot, updated 2023/4 COVID-19 vaccine, and his RSV vaccine.    He is on 2 medications for his bladder, namely tamsulosin and oxybutynin.  I told Jorge that the continuation of those drugs is all about improving his symptoms, but I told Jorge to expect that he is always good to be bothered by some degree of bladder symptoms, since he had radiation treatments for prostate cancer.    Chronic and progressive pain in the buttocks, hips, and both thighs, symptoms seem quite suspicious for neuro claudication of lumbar spinal stenosis, and furthermore he walks with a slightly forward flexed at the hip posture.  I reminded Jorge that it is okay to use acetaminophen (Tylenol) for pain relief.  Maximum is 3000 mg in 24 hours.    For Jorge's  knees, he might also use Voltaren gel (topical diclofenac), 2 or 3 times a day as needed.    Degenerative  arthritis in lumbar spine, hips, knees  EXAM: XR LUMBAR SPINE 2/3 VIEWS  LOCATION: [facility]  DATE: 8/17/2023  INDICATION:  Spinal stenosis of lumbar region with neurogenic claudication  COMPARISON: 02/03/2021                                  IMPRESSION: No fracture. Normal vertebral heights and alignment. Moderate multilevel disc height loss. Scattered mild multilevel osteophyte formation. Moderate facet hypertrophy at L4-L5 and L5-S1. Aortic atherosclerosis.    EXAM: XR PELVIS AND HIP BILATERAL 2 VIEWS  LOCATION: [facility]  DATE: 8/17/2023  INDICATION: Spinal stenosis of lumbar region with neurogenic claudication.  COMPARISON: None.                                       IMPRESSION: Mild to moderate arthrosis bilateral hips. Bones are demineralized. No acute fracture or dislocation.    EXAM: XR KNEE BILATERAL 3 VIEWS  LOCATION: [facility]  DATE: 10/6/2023  INDICATION:  Decreased range of motion of both hips  COMPARISON: None.                          IMPRESSION:   RIGHT KNEE: No acute fracture or malalignment. Mild medial and patellofemoral compartment degenerative changes. Minimal knee joint effusion. Superior patellar enthesophyte. Osteopenia.  LEFT KNEE: No acute fracture or malalignment. Mild patellofemoral compartment degenerative changes. No knee joint effusion. Superior patellar enthesophyte. Osteopenia.    Intermittent edema right leg and ankle, which I believe is on the basis of venous insufficiency.  He has hyperpigmentation of the medial right ankle and also a lot of spider varicosities.  I told when he probably has leaky veins in his right lower extremity, which produces a pressure column and subsequent swelling.  I suggested that Jorge try over-the-counter knee-high compression stockings.  Varicose veins lower right leg, with a patch of venous stasis dermatitis medial right ankle, for which she has seen a dermatologist for this  and prescribed some steroid cream     Essential hypertension, on amlodipine 5 mg  Target blood pressure is less than 130 systolic, and around 80 diastolic.     I reminded Jorge to control the amount of sodium in his diet, try to keep that below 3000 g/day.  He does maintain healthy weight, and stays physically active.     BP Readings from Last 6 Encounters:   12/01/23 128/62   10/05/23 (!) 143/70   08/17/23 120/60   05/03/23 127/62   02/16/23 (!) 166/74   04/20/22 (!) 160/70     2-  LDL Cholesterol Calculated <=100 mg/dL 91      Direct Measure HDL >=40 mg/dL 79      Triglycerides <150 mg/dL 32     Bladder urgency and frequency, leakage, in the context of history of prostate cancer and pelvic radiation therapy that completed in April 2019,     Immediate release oxybutynin does help symptoms somewhat, but he still bothered by urgency  Dr Alejandro also added tamsulosin    He is on 2 medications for his bladder, namely tamsulosin and oxybutynin.  I told Jorge that the continuation of those drugs is all about improving his symptoms, but I told Jorge to expect that he is always good to be bothered by some degree of bladder symptoms, since he had radiation treatments for prostate cancer.    I recommended to Jorge that he consider implementing a program of scheduled voiding, which means emptying the bladder approximately every hour while awake on a timed schedule, rather than waiting for the urge to go    [facility] suggested that Jorge could do urodynamic studies, which I told gland is a method to precisely measure the flows and volumes of urine as he empties his bladder.  That might give some useful information, but I think even more useful would be to make sure that Jorge is able to empty his bladder completely, and that would mean checking a postvoid residual with an ultrasound image, and that is a quick office procedure available at [facility].    He might also adjust the amount of fluid that he  ingests in the evening, to hopefully reduce somewhat the number of nighttime trips to the bathroom     Has a febrile 16 2023, Jorge has been taking oxybutynin 5 mg tablet twice a day.  I told him he could try an experiment of increasing the dose to 2 tablets rather than 1 tablet for 1 or both of the daily dosings, but to be on the look out for side effects of dry mouth, constipation, which are anticholinergic side effects     At [facility] Dr Alejandro  10- PSA 0.1     Samples of Gentesa-- nausea  Solifenicen-- expensive     Tamsulosin stopped 6/9/2021 after syncopal spell, then restarted  But he told me he cannot perceive any symptomatic benefit     oxybutynin does benefit him symptomatically.     DISEASE TREATED: Unfavorable intermediate risk prostate cancer, clinical stage T2 cN0 M0, Ruffin grade 4+3=7 and 3+4 =7 involving up to 90% of biopsy tissue bilaterally, and pretherapy PSA of 10.2.  TYPE OF RADIATION THERAPY ADMINISTERED:  7942 cGy in 45 treatments given from 2/19/2019-5/2/2019     Mild normocytic anemia, resolved  2-  Hemoglobin 13.3 - 17.7 g/dL 13.9      No recurrence of syncopal spell 6/7/2021   Probably this fainting spell was because of low blood pressure. I advised him at that time to stop tamsulosin which he did     Weight stablized  His PSA has remained close to 0, so I do not think we need to worry about any recurrence of prostate cancer.     Wt Readings from Last 5 Encounters:   12/01/23 75.8 kg (167 lb)   10/05/23 75.3 kg (166 lb)   08/17/23 74.3 kg (163 lb 12.8 oz)   05/03/23 76.5 kg (168 lb 11.2 oz)   02/16/23 76.2 kg (168 lb)     External hemorrhoids, comes and goes, with history of small adenomatous colon polyps removed January 2015; possibly hemorrhoids relate to vascular congestion as a consequence of pelvic radiation for prostate cancer  He told me the bleeding flares up from time to time.  He is using Colace stool softener and also the osmotic laxative MiraLAX.  I reminded  him to adjust the dose of MiraLAX and/or Metamucil to achieve soft stools, but not trigger diarhea      It is possible that radiation therapy exacerbated tendency for hemorrhoids, possibly by forming scar tissue that could contribute to venous congestion     February 2020, he had approximately 3 to 4 mm external hemorrhoid, with some clot inside.  I did not detect significant internal hemorrhoids.  He did have a colonoscopy performed January 2015 which disclosed 2 small adenomatous polyps that were removed.  These were less than 4 mm. Also noted was long redundant colon. Diverticulosis not noted.     Earwax  I recommended that he purchase over-the-counter wax dissolving eardrop, example brand Debrox or Murine, follow the package directions  Recommend that he do that perhaps once a week to keep the ears clear     Synovial cyst left shoulder, which protrudes anteriorly, and fluctuates in size.  I told that the cyst may wax and wane based on the amount of fluid inside the joint which can get resorbed, and fluid might increase if the joint is irritated for some reason.  This is benign, he still has a good range of motion, and nothing needs to be done     Actinic keratoses, working with his dermatologist.       Vaccine     Immunization History   Administered Date(s) Administered    COVID-19 12+ (2023-24) (Pfizer) 11/16/2023    COVID-19 Bivalent 12+ (Pfizer) 11/01/2022    COVID-19 MONOVALENT 12+ (Pfizer) 02/16/2021, 03/09/2021, 10/11/2021    COVID-19 Monovalent 12+ (Pfizer 2022) 05/16/2022    Flu 65+ Years 09/13/2022    Influenza (H1N1) 03/15/2010    Influenza (High Dose) 3 valent vaccine 10/07/2017, 08/27/2018, 09/17/2019, 09/06/2021    Influenza (IIV3) PF 10/01/2004, 10/01/2006, 10/01/2007, 08/15/2009, 08/31/2009, 10/23/2010, 10/15/2011, 08/25/2012, 10/01/2013, 08/22/2014    Influenza Vaccine 65+ (FLUAD) 10/16/2023    Influenza Vaccine 65+ (Fluzone HD) 09/13/2022    Influenza Vaccine, 6+MO IM (QUADRIVALENT  W/PRESERVATIVES) 08/28/2015    Pneumo Conj 13-V (2010&after) 05/14/2015    Pneumococcal (PCV 7) 07/30/2010    Pneumococcal 23 valent 01/21/2016    RSV Vaccine (Abrysvo) 09/28/2023    RSV Vaccine (Arexvy) 09/28/2023    TD,PF 7+ (Tenivac) 02/22/2005, 09/28/2016    Td (Adult), Adsorbed 06/16/1997    Zoster vaccine, live 08/13/2012         --------------------------------------------------------------------------------------------------------------------------  History of Present Illness  This 83 year old old     Reason for visit:  Wellness check     He eats 4 or more servings of fruits and vegetables daily.He consumes 1 sweetened beverage(s) daily.He exercises with enough effort to increase his heart rate 20 to 29 minutes per day.  He exercises with enough effort to increase his heart rate 3 or less days per week.   He is taking medications regularly.    Wt Readings from Last 3 Encounters:   12/01/23 75.8 kg (167 lb)   10/05/23 75.3 kg (166 lb)   08/17/23 74.3 kg (163 lb 12.8 oz)     BP Readings from Last 3 Encounters:   12/01/23 128/62   10/05/23 (!) 143/70   08/17/23 120/60       ---------------------------------------------------------------------------------------------------------------------------    Medications, Allergies, Social, and Problem List     Current Outpatient Medications   Medication Sig Dispense Refill    acetaminophen (TYLENOL) 500 MG tablet Take 500-1,000 mg by mouth every 6 hours as needed for mild pain      amLODIPine (NORVASC) 5 MG tablet TAKE 1 TABLET BY MOUTH EVERY DAY 90 tablet 3    docusate sodium (COLACE) 100 MG capsule Take 100 mg by mouth 2 times daily as needed for constipation      oxybutynin (DITROPAN) 5 MG tablet Take 1 tablet (5 mg) by mouth 2 times daily 180 tablet 3    polyethylene glycol (MIRALAX) 17 GM/Dose powder Take 1 packet every day by oral route.      triamcinolone (KENALOG) 0.1 % external cream triamcinolone acetonide 0.1 % topical cream   APPLY TO BOTH ANKLES TWICE A DAY  "AS NEEDED WHEN FLARING.      solifenacin (VESICARE) 10 MG tablet solifenacin 10 mg tablet (Patient not taking: Reported on 5/3/2023)       Allergies   Allergen Reactions    Alpha Blocker Quinazolines      rash     Social History     Tobacco Use    Smoking status: Never     Passive exposure: Never    Smokeless tobacco: Never   Vaping Use    Vaping Use: Never used   Substance Use Topics    Alcohol use: No     Alcohol/week: 0.0 - 1.0 standard drinks of alcohol     Comment: SELDOM TO SMALL    Drug use: No     Patient Active Problem List   Diagnosis    Essential hypertension, benign    Lumbago    Hearing loss    Hypertrophy of prostate with urinary obstruction    CARDIOVASCULAR SCREENING; LDL GOAL LESS THAN 160    Advanced directives, counseling/discussion    Urgency-frequency syndrome    Elevated prostate specific antigen (PSA)    Pain in joint, shoulder region    Biceps tendonitis    Lateral epicondylitis    Gluteal pain    Primary osteoarthritis of both hips    Decreased range of motion of both hips    Chronic pain of both knees    Sagittal plane imbalance        Reviewed, reconciled and updated       Physical Exam   General Appearance:       /62 (BP Location: Right arm, Patient Position: Sitting, Cuff Size: Adult Regular)   Pulse 72   Temp 98.6  F (37  C)   Resp 16   Ht 1.784 m (5' 10.25\")   Wt 75.8 kg (167 lb)   SpO2 99%   BMI 23.79 kg/m      Jorge appears generally well today, may be a bit nervous, but blood pressure is okay     Additional Information   I spent 20 minutes on this encounter, including reviewing interval history since last visit, examining the patient, explaining and counseling the issues enumerated in the Assessment and Plan (patient given a copy), ordering indicated tests, ordering prescriptions       JUNITO TREVINO MD, MD        "

## 2023-12-01 NOTE — PATIENT INSTRUCTIONS
Follow-up for multiple issues, and specifically Jorge (who comes to clinic today accompanied by his wife) wants to focus on a problem of    Nice result from physical therapy which Jorge completed mid November 2023 to address gluteal pain and lumbar stenosis.  I reminded Jorge of the importance of doing the daily exercises prescribed by the physical therapist.     Will have Jorge swing by the laboratory this morning to recheck his blood sugar with a basic metabolic panel, and also check A1c to screen for diabetes.  I reviewed with gland the blood test he had done early 2023 which all look great including the lipid panel, blood cell counts, TSH thyroid test, and comprehensive metabolic panel--except for slight glucose elevation of 109.    Jorge already received his seasonal flu shot, updated 2023/4 COVID-19 vaccine, and his RSV vaccine.    He is on 2 medications for his bladder, namely tamsulosin and oxybutynin.  I told Jorge that the continuation of those drugs is all about improving his symptoms, but I told Jorge to expect that he is always good to be bothered by some degree of bladder symptoms, since he had radiation treatments for prostate cancer.    Chronic and progressive pain in the buttocks, hips, and both thighs, symptoms seem quite suspicious for neuro claudication of lumbar spinal stenosis, and furthermore he walks with a slightly forward flexed at the hip posture.  I reminded Jorge that it is okay to use acetaminophen (Tylenol) for pain relief.  Maximum is 3000 mg in 24 hours.    For Jorge's  knees, he might also use Voltaren gel (topical diclofenac), 2 or 3 times a day as needed.    Degenerative arthritis in lumbar spine, hips, knees  EXAM: XR LUMBAR SPINE 2/3 VIEWS  LOCATION: [facility]  DATE: 8/17/2023  INDICATION:  Spinal stenosis of lumbar region with neurogenic claudication  COMPARISON: 02/03/2021                                  IMPRESSION: No fracture. Normal vertebral  heights and alignment. Moderate multilevel disc height loss. Scattered mild multilevel osteophyte formation. Moderate facet hypertrophy at L4-L5 and L5-S1. Aortic atherosclerosis.    EXAM: XR PELVIS AND HIP BILATERAL 2 VIEWS  LOCATION: [facility]  DATE: 8/17/2023  INDICATION: Spinal stenosis of lumbar region with neurogenic claudication.  COMPARISON: None.                                       IMPRESSION: Mild to moderate arthrosis bilateral hips. Bones are demineralized. No acute fracture or dislocation.    EXAM: XR KNEE BILATERAL 3 VIEWS  LOCATION: [facility]  DATE: 10/6/2023  INDICATION:  Decreased range of motion of both hips  COMPARISON: None.                          IMPRESSION:   RIGHT KNEE: No acute fracture or malalignment. Mild medial and patellofemoral compartment degenerative changes. Minimal knee joint effusion. Superior patellar enthesophyte. Osteopenia.  LEFT KNEE: No acute fracture or malalignment. Mild patellofemoral compartment degenerative changes. No knee joint effusion. Superior patellar enthesophyte. Osteopenia.    Intermittent edema right leg and ankle, which I believe is on the basis of venous insufficiency.  He has hyperpigmentation of the medial right ankle and also a lot of spider varicosities.  I told when he probably has leaky veins in his right lower extremity, which produces a pressure column and subsequent swelling.  I suggested that Jorge try over-the-counter knee-high compression stockings.  Varicose veins lower right leg, with a patch of venous stasis dermatitis medial right ankle, for which she has seen a dermatologist for this and prescribed some steroid cream     Essential hypertension, on amlodipine 5 mg  Target blood pressure is less than 130 systolic, and around 80 diastolic.     I reminded Jorge to control the amount of sodium in his diet, try to keep that below 3000 g/day.  He does maintain healthy weight, and stays  physically active.     BP Readings from Last 6 Encounters:   12/01/23 128/62   10/05/23 (!) 143/70   08/17/23 120/60   05/03/23 127/62   02/16/23 (!) 166/74   04/20/22 (!) 160/70     2-  LDL Cholesterol Calculated <=100 mg/dL 91      Direct Measure HDL >=40 mg/dL 79      Triglycerides <150 mg/dL 32     Bladder urgency and frequency, leakage, in the context of history of prostate cancer and pelvic radiation therapy that completed in April 2019,     Immediate release oxybutynin does help symptoms somewhat, but he still bothered by urgency  Dr Alejandro also added tamsulosin    He is on 2 medications for his bladder, namely tamsulosin and oxybutynin.  I told Jorge that the continuation of those drugs is all about improving his symptoms, but I told Jorge to expect that he is always good to be bothered by some degree of bladder symptoms, since he had radiation treatments for prostate cancer.    I recommended to Jorge that he consider implementing a program of scheduled voiding, which means emptying the bladder approximately every hour while awake on a timed schedule, rather than waiting for the urge to go    [facility] suggested that Jorge could do urodynamic studies, which I told gland is a method to precisely measure the flows and volumes of urine as he empties his bladder.  That might give some useful information, but I think even more useful would be to make sure that Jorge is able to empty his bladder completely, and that would mean checking a postvoid residual with an ultrasound image, and that is a quick office procedure available at [facility].    He might also adjust the amount of fluid that he ingests in the evening, to hopefully reduce somewhat the number of nighttime trips to the bathroom     Has a febrile 16 2023, Jorge has been taking oxybutynin 5 mg tablet twice a day.  I told him he could try an experiment of increasing the dose to 2 tablets rather than 1 tablet for 1 or both of the  daily dosings, but to be on the look out for side effects of dry mouth, constipation, which are anticholinergic side effects     At [facility] Dr Alejandro  10- PSA 0.1     Samples of Gentesa-- nausea  Solifenicen-- expensive     Tamsulosin stopped 6/9/2021 after syncopal spell, then restarted  But he told me he cannot perceive any symptomatic benefit     oxybutynin does benefit him symptomatically.     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  TYPE OF RADIATION THERAPY ADMINISTERED:  7942 cGy in 45 treatments given from 2/19/2019-5/2/2019     Mild normocytic anemia, resolved  2-  Hemoglobin 13.3 - 17.7 g/dL 13.9      No recurrence of syncopal spell 6/7/2021   Probably this fainting spell was because of low blood pressure. I advised him at that time to stop tamsulosin which he did     Weight stablized  His PSA has remained close to 0, so I do not think we need to worry about any recurrence of prostate cancer.     Wt Readings from Last 5 Encounters:   12/01/23 75.8 kg (167 lb)   10/05/23 75.3 kg (166 lb)   08/17/23 74.3 kg (163 lb 12.8 oz)   05/03/23 76.5 kg (168 lb 11.2 oz)   02/16/23 76.2 kg (168 lb)     External hemorrhoids, comes and goes, with history of small adenomatous colon polyps removed January 2015; possibly hemorrhoids relate to vascular congestion as a consequence of pelvic radiation for prostate cancer  He told me the bleeding flares up from time to time.  He is using Colace stool softener and also the osmotic laxative MiraLAX.  I reminded him to adjust the dose of MiraLAX and/or Metamucil to achieve soft stools, but not trigger diarhea      It is possible that radiation therapy exacerbated tendency for hemorrhoids, possibly by forming scar tissue that could contribute to venous congestion     February 2020, he had approximately 3 to  4 mm external hemorrhoid, with some clot inside.  I did not detect significant internal hemorrhoids.  He did have a colonoscopy performed January 2015 which disclosed 2 small adenomatous polyps that were removed.  These were less than 4 mm. Also noted was long redundant colon. Diverticulosis not noted.     Earwax  I recommended that he purchase over-the-counter wax dissolving eardrop, example brand Debrox or Murine, follow the package directions  Recommend that he do that perhaps once a week to keep the ears clear     Synovial cyst left shoulder, which protrudes anteriorly, and fluctuates in size.  I told that the cyst may wax and wane based on the amount of fluid inside the joint which can get resorbed, and fluid might increase if the joint is irritated for some reason.  This is benign, he still has a good range of motion, and nothing needs to be done     Actinic keratoses, working with his dermatologist.       Vaccine     Immunization History   Administered Date(s) Administered    COVID-19 12+ (2023-24) (Pfizer) 11/16/2023    COVID-19 Bivalent 12+ (Pfizer) 11/01/2022    COVID-19 MONOVALENT 12+ (Pfizer) 02/16/2021, 03/09/2021, 10/11/2021    COVID-19 Monovalent 12+ (Pfizer 2022) 05/16/2022    Flu 65+ Years 09/13/2022    Influenza (H1N1) 03/15/2010    Influenza (High Dose) 3 valent vaccine 10/07/2017, 08/27/2018, 09/17/2019, 09/06/2021    Influenza (IIV3) PF 10/01/2004, 10/01/2006, 10/01/2007, 08/15/2009, 08/31/2009, 10/23/2010, 10/15/2011, 08/25/2012, 10/01/2013, 08/22/2014    Influenza Vaccine 65+ (FLUAD) 10/16/2023    Influenza Vaccine 65+ (Fluzone HD) 09/13/2022    Influenza Vaccine, 6+MO IM (QUADRIVALENT W/PRESERVATIVES) 08/28/2015    Pneumo Conj 13-V (2010&after) 05/14/2015    Pneumococcal (PCV 7) 07/30/2010    Pneumococcal 23 valent 01/21/2016    RSV Vaccine (Abrysvo) 09/28/2023    RSV Vaccine (Arexvy) 09/28/2023    TD,PF 7+ (Tenivac) 02/22/2005, 09/28/2016    Td (Adult), Adsorbed 06/16/1997    Zoster vaccine,  live 08/13/2012

## 2023-12-19 PROBLEM — G89.29 CHRONIC PAIN OF BOTH KNEES: Status: RESOLVED | Noted: 2023-10-09 | Resolved: 2023-12-19

## 2023-12-19 PROBLEM — M79.18 GLUTEAL PAIN: Status: RESOLVED | Noted: 2023-10-09 | Resolved: 2023-12-19

## 2023-12-19 PROBLEM — M25.562 CHRONIC PAIN OF BOTH KNEES: Status: RESOLVED | Noted: 2023-10-09 | Resolved: 2023-12-19

## 2023-12-19 PROBLEM — M25.561 CHRONIC PAIN OF BOTH KNEES: Status: RESOLVED | Noted: 2023-10-09 | Resolved: 2023-12-19

## 2023-12-19 PROBLEM — M16.0 PRIMARY OSTEOARTHRITIS OF BOTH HIPS: Status: RESOLVED | Noted: 2023-10-09 | Resolved: 2023-12-19

## 2023-12-19 NOTE — PROGRESS NOTES
DISCHARGE  Reason for Discharge: Held pt's chart open for 30 days if he wanted to return to PT. It has been >30 days since last visit, discharging per clinic policy.     Equipment Issued: none    Discharge Plan: Patient to continue home program.    Referring Provider:  Bryan Mcgrath       11/14/23 0500   Appointment Info   Signing clinician's name / credentials Aniyah Evangelista, PT   Visits Used 5   Medical Diagnosis Gluteal pain; Primary osteoarthritis of both hips; Decreased range of motion of both hips; Chronic pain of both knees; Sagittal plane imbalance   PT Tx Diagnosis Gluteal pain; Primary osteoarthritis of both hips; Decreased range of motion of both hips; Chronic pain of both knees; Sagittal plane imbalance   Progress Note/Certification   Start of Care Date 10/09/23   Onset of illness/injury or Date of Surgery 10/05/23   Therapy Frequency 2-1x/week   Predicted Duration 60 days   Certification date from 10/09/23   Certification date to 12/08/23   Progress Note Due Date 11/08/23   Progress Note Completed Date 10/09/23   PT Goal 1   Goal Identifier HEP   Goal Description Patient will be independent with HEP and self management of symptoms   Rationale to maximize safety and independence with performance of ADLs and functional tasks;to maximize safety and independence within the home   Goal Progress MET   Target Date 12/08/23   Date Met 11/14/23   PT Goal 2   Goal Identifier transfers   Goal Description Patient will perform sit to stand transfers from a standard height chair without UE and with pain 3/10 or less   Rationale to maximize safety and independence with performance of ADLs and functional tasks   Goal Progress MET; able to perform sit to stands with no UE support with 3/10 right hip pain   Target Date 12/08/23   Date Met 11/14/23   PT Goal 3   Goal Identifier walking   Goal Description Patient will return to walking for exercise with hip and knee pain 3/10 or less.   Rationale to maximize safety and  independence with performance of ADLs and functional tasks;to maximize safety and independence within the community   Goal Progress Has returned to walking for exercise & only notes a little pain in his right hip.   Target Date 12/08/23   Date Met 11/14/23   PT Goal 4   Goal Identifier stairs   Goal Description Patient will ascend/descend stairs with handrail and pain 3/10 or less.   Rationale to maximize safety and independence with performance of ADLs and functional tasks;to maximize safety and independence within the home   Goal Progress Able to ascend/descend stairs with 2-3/10 right hip pain.   Target Date 12/08/23   Date Met 11/14/23   Subjective Report   Subjective Report Jorge reports his hip pain is continuing to improve. Notes an overall 70% improvement from SOC. Continues to note right hip pain, but mild compared to when he first started therapy.   Objective Measures   Objective Measures Objective Measure 1   Objective Measure 1   Objective Measure Vitals   Details BP: 131/79   HR: 77 bpm --taken after standing up from supine due to pt reports of feeling light headed   Therapeutic Procedure/Exercise   Therapeutic Procedures: strength, endurance, ROM, flexibillity minutes (24184) 39   Therapeutic Procedures Ther Proc 2;Ther Proc 3;Ther Proc 4;Ther Proc 5;Ther Proc 6;Ther Proc 7;Ther Proc 8;Ther Proc 9;Ther Proc 10   Ther Proc 1 NuStep: x6 min, WL 5   Ther Proc 1 - Details DKTC stretch: x30 sec as review for HEP   Ther Proc 2 LTR: discont for HEP due to incr right hip pain   Ther Proc 2 - Details Supine hip butterfly stretch: verbal review for HEP   Ther Proc 3 Supine marches: not today   Ther Proc 3 - Details Bridge: x10 with tactile and verbal cues to bear more weight through R LE   Ther Proc 4 Seated hamstring stretch: verbal review for HEP   Ther Proc 4 - Details Sit to stands: x6 as review for HEP   Ther Proc 5 Seated sciatic nerve glide: verbal review for HEP   Ther Proc 5 - Details Standing hip  flexor stretch: verbal review for HEP   Ther Proc 6 Supine piriformis stretch above 90: verbal review for HEP   Ther Proc 6 - Details Clamshells: attempted again today, challenging & had incr R when laying on R side   Ther Proc 7 Hooklying hip abduction: verbal review for HEP   Ther Proc 7 - Details Mini squats at // bar: not today   Ther Proc 8 Standing hip abductions: x20 marilyn, alternating   Ther Proc 8 - Details Sidestepping: 2 x30 ft (leading with each LE x1)   Skilled Intervention Review of HEP, demonstration & cueing provided, discussed therapy POC & discharge planning   Patient Response/Progress Demonstrated understanding, added a few more hip strengthening exercises   Education   Learner/Method Patient;Significant Other  (significant other took her own notes on initial evaluation and treatment.)   Plan   Plan for next session Holding chart open for 30 days.   Comments   Comments Jorge presents to therapy for his fourth follow-up visit in regards to bilateral gluteal pain, bilateral hip OA, and chronic bilateral knee pain. Overall he has noted a 70% improvement in his pain and has met all of his goals for therapy. He is able to ascend/descend stairs, walk, and perform sit to stands with only mild right hip pain which is an improvement from SOC. Jorge would like to continue with his HEP on his own. Did discuss that I can keep his chart open for 30 days if he feels he needs to return (before he would need another referral from MD). Pt agrees with POC.   Total Session Time   Timed Code Treatment Minutes 39   Total Treatment Time (sum of timed and untimed services) 39

## 2024-01-09 ENCOUNTER — NURSE TRIAGE (OUTPATIENT)
Dept: INTERNAL MEDICINE | Facility: CLINIC | Age: 84
End: 2024-01-09

## 2024-01-09 ENCOUNTER — VIRTUAL VISIT (OUTPATIENT)
Dept: URGENT CARE | Facility: CLINIC | Age: 84
End: 2024-01-09
Payer: MEDICARE

## 2024-01-09 DIAGNOSIS — U07.1 INFECTION DUE TO 2019 NOVEL CORONAVIRUS: Primary | ICD-10-CM

## 2024-01-09 PROCEDURE — 99212 OFFICE O/P EST SF 10 MIN: CPT | Mod: 95

## 2024-01-09 NOTE — TELEPHONE ENCOUNTER
RN COVID TREATMENT VISIT  01/09/24      The patient has been triaged and does not require a higher level of care.    Jorge Dupree  83 year old  Current weight? 167lb    Has the patient been seen by a primary care provider at an [facility] or [facility] within the past two years? Yes.   Have you been in close proximity to/do you have a known exposure to a person with a confirmed case of influenza? No.     General treatment eligibility:  Date of positive COVID test (PCR or at home)?  1/9/24    Are you or have you been hospitalized for this COVID-19 infection? No.   Have you received monoclonal antibodies or antiviral treatment for COVID-19 since this positive test? No.   Do you have any of the following conditions that place you at risk of being very sick from COVID-19?   - Age 50 years or older  - Heart conditions such as cardiomyopathies, congenital heart defects, coronary artery disease, heart arrhythmias, heart failure, hypertension, valve disorders   Yes, patient has at least one high risk condition as noted above.     Current COVID symptoms:   - fever or chills  - fatigue  - headache  - sore throat  Yes. Patient has at least one symptom as selected.     How many days since symptoms started? 5 days or less. Established patient, 12 years or older weighing at least 88.2 lbs, who has symptoms that started in the past 5 days, has not been hospitalized nor received treatment already, and is at risk for being very sick from COVID-19.     Treatment eligibility by RN:  Are you currently pregnant or nursing? No  Do you have a clinically significant hypersensitivity to nirmatrelvir or ritonavir, or toxic epidermal necrolysis (TEN) or Celaya-Henri Syndrome? No  Do you have a history of hepatitis, any hepatic impairment on the Problem List (such as Child-Gibson Class C, cirrhosis, fatty liver disease, alcoholic liver disease), or was the last liver lab (hepatic panel, ALT, AST, ALK Phos,  bilirubin) elevated in the past 6 months? No  Do you have any history of severe renal impairment (eGFR < 30mL/min)? No    Is patient eligible to continue? Yes, patient meets all eligibility requirements for the RN COVID treatment (as denoted by all no responses above).     Current Outpatient Medications   Medication Sig Dispense Refill    acetaminophen (TYLENOL) 500 MG tablet Take 500-1,000 mg by mouth every 6 hours as needed for mild pain      amLODIPine (NORVASC) 5 MG tablet TAKE 1 TABLET BY MOUTH EVERY DAY 90 tablet 3    docusate sodium (COLACE) 100 MG capsule Take 100 mg by mouth 2 times daily as needed for constipation      oxybutynin (DITROPAN) 5 MG tablet Take 1 tablet (5 mg) by mouth 2 times daily 180 tablet 3    polyethylene glycol (MIRALAX) 17 GM/Dose powder Take 1 packet every day by oral route.      tamsulosin (FLOMAX) 0.4 MG capsule Take 0.4 mg by mouth daily      triamcinolone (KENALOG) 0.1 % external cream triamcinolone acetonide 0.1 % topical cream   APPLY TO BOTH ANKLES TWICE A DAY AS NEEDED WHEN FLARING.         Medications from List 1 of the standing order (on medications that exclude the use of Paxlovid) that patient is taking: NONE. Is patient taking [place]'s Wort? No  Is patient taking Alex's Wort or any meds from List 1? No.   Medications from List 2 of the standing order (on meds that provider needs to adjust) that patient is taking: amlodipine (Norvasc), explained a provider visit is necessary to discuss medication adjustments while taking Paxlovid. Is patient on any of the meds from List 2? Yes. Patient will be scheduled or transferred to a  at the end of this call.   Damaris Santos RN

## 2024-01-09 NOTE — TELEPHONE ENCOUNTER
Additional Information    Negative: SEVERE difficulty breathing (e.g., struggling for each breath, speaks in single words)    Negative: Difficult to awaken or acting confused (e.g., disoriented, slurred speech)    Negative: Bluish (or gray) lips or face now    Negative: Shock suspected (e.g., cold/pale/clammy skin, too weak to stand, low BP, rapid pulse)    Negative: Sounds like a life-threatening emergency to the triager    Protocols used: Coronavirus (COVID-19) Diagnosed or [id number]

## 2024-01-09 NOTE — PROGRESS NOTES
"Assessment:    Infection due to 2019 novel coronavirus         COVID-19 positive patient.  Encounter for consideration of medication intervention.      Plan:    Patient Instructions   To go to the ER, call 911 if shortness of breath, difficulty breathing or chest pain. Patient is agreeable to the plan, but may wait a little bit before going.      Onesimo Patel  is a 83 year old  who has a confirmed new positive COVID-19 diagnosis.      He has been identified as high risk for complications of this infection, and is being evaluated via a billable. Patient reports that he began feeling sick during the night last night. He took some tylenol with 7-up and vomited. This am he tried tylenol with milk and vomited again. He took a Covid test this am and was positive. He feels nauseous. He has pain under his right rib cage.  He isn't able to keep anything down, he tried to drink a little water and  had the dry heaves. He has no difficulty breathing, or shortness of breath. He is able to take a deep breath without increase in pain under his right rib cage. He had his gall bladder removed a while ago. He is mostly stuffy as far as head congestion goes.     Video visit:     Video-Visit Details    Type of service:  Video Visit    Video Visit Start Time: 2:59 PM    Video End Time: 3:13 PM    Originating Location (pt. Location): Home    Distant Location (provider location):  [facility]     Platform used for Video Visit: [employer]      Objective    Vitals:  No vitals were obtained today due to virtual visit.  Estimated body mass index is 23.79 kg/m  as calculated from the following:    Height as of 12/1/23: 1.784 m (5' 10.25\").    Weight as of 12/1/23: 75.8 kg (167 lb).   GENERAL: Healthy, alert and no distress  EYES: Eyes grossly normal to inspection.  No discharge or erythema, or obvious scleral/conjunctival abnormalities.  RESP: No audible wheeze, cough, or visible cyanosis.  No visible retractions or " increased work of breathing.    SKIN: Visible skin clear. No significant rash, abnormal pigmentation or lesions.  NEURO: Cranial nerves grossly intact.  Mentation and speech appropriate for age.  PSYCH: Mentation appears normal, affect normal/bright, judgement and insight intact, normal speech and appearance well-groomed.  GFR Estimate   Date Value Ref Range Status   12/01/2023 >90 >60 mL/min/1.73m2 Final   06/02/2021 >60 >60 mL/min/1.73m2 Final   09/30/2013 >90 >60 mL/min/1.7m2 Final                Nimisha Wayne, FNP-BC, CNP

## 2024-01-09 NOTE — TELEPHONE ENCOUNTER
S-(situation): COVID +    B-(background): Symptom onset 1/8/24. Positive home test 1/9/24. Wife test postive a couple days before him.    A-(assessment): Patient has sore throat, fever, headache. Temperature 101.F. Denied SOB, wheezing, chest pain, cough, nasal drainage.     R-(recommendations): Patient is eligible and interested in treatment. Reviewed red flag symptoms and when to be seen in ED. Encouraged to call back with any questions or concerns. RN treatment protocol started.       Reason for Disposition   HIGH RISK patient (e.g., weak immune system, age > 64 years, obesity with BMI of 30 or higher, pregnant, chronic lung disease or other chronic medical condition) and COVID symptoms (e.g., cough, fever)  (Exceptions: Already seen by doctor or NP/PA and no new or worsening symptoms.)    Additional Information   Negative: SEVERE difficulty breathing (e.g., struggling for each breath, speaks in single words)   Negative: Difficult to awaken or acting confused (e.g., disoriented, slurred speech)   Negative: Bluish (or gray) lips or face now   Negative: Shock suspected (e.g., cold/pale/clammy skin, too weak to stand, low BP, rapid pulse)   Negative: Sounds like a life-threatening emergency to the triager   Negative: SEVERE or constant chest pain or pressure  (Exception: Mild central chest pain, present only when coughing.)   Negative: MODERATE difficulty breathing (e.g., speaks in phrases, SOB even at rest, pulse 100-120)   Negative: Headache and stiff neck (can't touch chin to chest)   Negative: Oxygen level (e.g., pulse oximetry) 90% or lower   Negative: Chest pain or pressure  (Exception: MILD central chest pain, present only when coughing.)   Negative: Drinking very little and dehydration suspected (e.g., no urine > 12 hours, very dry mouth, very lightheaded)   Negative: Patient sounds very sick or weak to the triager   Negative: MILD difficulty breathing (e.g., minimal/no SOB at rest, SOB with walking, pulse  <100)   Negative: Fever > 103 F (39.4 C)   Negative: Fever > 101 F (38.3 C) and over 60 years of age   Negative: Fever > 100.0 F (37.8 C) and bedridden (e.g., CVA, chronic illness, recovering from surgery)   Negative: Diagnosed or suspected COVID-19 and symptoms lasting 3 or more weeks   Negative: COVID-19 exposure and no symptoms   Negative: COVID-19 vaccine reaction suspected (e.g., fever, headache, muscle aches) occurring 1 to 3 days after getting vaccine   Negative: COVID-19 vaccine, questions about   Negative: Lives with someone known to have influenza (flu test positive) and flu-like symptoms (e.g., cough, runny nose, sore throat, SOB; with or without fever)   Negative: Possible COVID-19 symptoms and triager concerned about severity of symptoms or other causes   Negative: COVID-19 and breastfeeding, questions about    Protocols used: Coronavirus (COVID-19) Diagnosed or [id number]

## 2024-01-09 NOTE — PATIENT INSTRUCTIONS
To go to the ER, call 911 if shortness of breath, difficulty breathing or chest pain. Patient is agreeable to the plan, but may wait a little bit before going.

## 2024-01-09 NOTE — TELEPHONE ENCOUNTER
COVID Positive/Requesting COVID treatment    Patient is positive for COVID and requesting treatment options.    Date of positive COVID test (PCR or at home)? 01/09/2024    Current COVID symptoms: sore throat, fever & headache    Date COVID symptoms began: 01/08/2024    Message should be routed to clinic RN pool. Best phone number to use for call back: [phone] (Home)     Josseline Aguiar

## 2024-01-10 ENCOUNTER — OFFICE VISIT (OUTPATIENT)
Dept: FAMILY MEDICINE | Facility: CLINIC | Age: 84
End: 2024-01-10
Payer: MEDICARE

## 2024-01-10 VITALS
HEART RATE: 66 BPM | OXYGEN SATURATION: 98 % | RESPIRATION RATE: 16 BRPM | BODY MASS INDEX: 23.62 KG/M2 | TEMPERATURE: 98.5 F | WEIGHT: 165.8 LBS | SYSTOLIC BLOOD PRESSURE: 128 MMHG | DIASTOLIC BLOOD PRESSURE: 71 MMHG

## 2024-01-10 DIAGNOSIS — R11.0 NAUSEA: ICD-10-CM

## 2024-01-10 DIAGNOSIS — U07.1 INFECTION DUE TO 2019 NOVEL CORONAVIRUS: Primary | ICD-10-CM

## 2024-01-10 DIAGNOSIS — R07.0 THROAT PAIN: ICD-10-CM

## 2024-01-10 DIAGNOSIS — R10.10 UPPER ABDOMINAL PAIN: ICD-10-CM

## 2024-01-10 LAB
DEPRECATED S PYO AG THROAT QL EIA: NEGATIVE
GROUP A STREP BY PCR: NOT DETECTED

## 2024-01-10 PROCEDURE — 99215 OFFICE O/P EST HI 40 MIN: CPT | Performed by: FAMILY MEDICINE

## 2024-01-10 PROCEDURE — 87651 STREP A DNA AMP PROBE: CPT | Performed by: FAMILY MEDICINE

## 2024-01-10 RX ORDER — ONDANSETRON 4 MG/1
4 TABLET, ORALLY DISINTEGRATING ORAL ONCE
Status: COMPLETED | OUTPATIENT
Start: 2024-01-10 | End: 2024-01-10

## 2024-01-10 RX ORDER — ONDANSETRON 4 MG/1
4 TABLET, ORALLY DISINTEGRATING ORAL EVERY 8 HOURS PRN
Qty: 12 TABLET | Refills: 0 | Status: SHIPPED | OUTPATIENT
Start: 2024-01-10 | End: 2024-07-22

## 2024-01-10 RX ADMIN — ONDANSETRON 4 MG: 4 TABLET, ORALLY DISINTEGRATING ORAL at 11:02

## 2024-01-10 NOTE — PATIENT INSTRUCTIONS
Paxlovid twice daily as directed for 5 days.   Decrease amlodipine to half tab daily for the 5 days of paxlovid and 3 days after.   Ondansetron (zofran) under the tongue every 8-12 hours if needed for nausea  Fluids, bland diet as tolerated. Only small amounts at a time, but more often - to trickle it through the stomach and not distend it.   May take tylenol as needed  To the ER if not keeping liquids or food done, more weakness or abdominal pain is worse.     For informational purposes only. Not to replace the advice of your health care provider. Copyright   2022 [facility]. All rights reserved. Clinically reviewed by Bertha Lu, PharmD, BCACP. [employer] [id number] - REV 06/23.  COVID-19 Outpatient Treatments  Your care team can help you find the best treatments for COVID-19. Talk to a health care provider or refer to the FDA medicine fact sheets below.  Paxlovid (nirmatrelvir and ritonavir): https://www.paxlovid.[employer]/resources  Molnupiravir (Lagevrio): https://www.fda.gov/media/058827/download  Important: We can only prescribe Paxlovid or Molnupiravir when it can be started within 5 days of first having symptoms.  Paxlovid (nimatrelvir and ritonavir)  How it works  Two medicines (nirmatrelvir and ritonavir) are taken together. They stop the virus from growing. Less amount of virus is easier for your body to fight.  Benefits  Lowers risk of a hospital stay or death from COVID-19.  How to take  Medicine comes in a daily container with both medicine tablets. Take by mouth twice daily (once in the morning, once at night) for 5 days.  The number of tablets to take varies by patient.  Don't chew or break capsules. Swallow whole.  When to take  Take it as soon as possible and within 5 days of your first symptoms.  Who can take it  Patients must be 12 years or older weigh at least 88 pounds. Paxlovid is the preferred treatment for pregnant patients.  Possible side effects  Can cause altered sense of taste,  diarrhea (loose, watery stools), high blood pressure, muscle aches.  Medicine conflicts  Some medicines may conflict with Paxlovid and may cause serious side effects.  Tell your care team about all the medicines you take, including prescription and over-the-counter medicines, vitamins, and herbal supplements.  Your care team will review your medicines to make sure that you can safely take Paxlovid.  Cautions  Paxlovid is not advised for patients with severe kidney or liver disease. If you have kidney or liver problems, the dose may need to be changed.  If you're pregnant or breastfeeding, talk to your care team about your options.  If you take hormonal birth control (such as the Pill), then you or your partner should also use a non-hormonal form of birth control (such as a condom). Keep doing this for 1 menstrual cycle after your last dose of Paxlovid.    Instructions for Patients    What are the symptoms of COVID-19?  Symptoms can include fever, cough, shortness of breath, chills, headache, muscle pain sore throat, fatigue, runny or stuffy nose, and loss of taste and smell. Other less common symptoms include nausea, vomiting, or diarrhea (watery stools).    Know when to call 911. Emergency warning signs include:  Trouble breathing or shortness of breath  Pain or pressure in the chest that doesn't go away  Feeling confused like you haven't felt before, or not being able to wake up  Bluish-colored lips or face    How can I take care of myself?  Get lots of rest. Drink extra fluids (unless a doctor has told you not to).  Take Tylenol (acetaminophen) for fever or pain. If you have liver or kidney problems, ask your family doctor if it's okay to take Tylenol   Adults can take either:   650 mg (two 325 mg pills or tablets) every 4 to 6 hours, or...   1,000 mg (two 500 mg pills) every 8 hours as needed.  Note: Don't take more than 3,000 mg in one day. Acetaminophen is found in many medicines (both prescribed and over the  counter). Read all labels to be sure you don't take too much.  For children, check the Tylenol bottle for the right dose. The dose is based on the child's age or weight.  Take over the counter medicines for your symptoms as needed. Talk to your pharmacist.  If you have other health problems (like cancer, heart failure, an organ transplant, or severe kidney disease): Call your specialty clinic if you don't feel better in the next 2 days.    Where can I get more information?   [employer] COVID-19 Resource Hub: www.[employer].org/covid19/   CDC Quarantine & Isolation: https://www.cdc.gov/coronavirus/2019-ncov/your-health/quarantine-isolation.html   CDC - What to Do If You're Sick: https://www.cdc.gov/coronavirus/2019-ncov/if-you-are-sick/index.html  Learn about the ACTIV-6 Clinical Trial: [email or web address] or call ([phone]

## 2024-01-11 NOTE — PROGRESS NOTES
Assessment/Plan:   Infection due to 2019 novel coronavirus  - nirmatrelvir and ritonavir (PAXLOVID) 300 mg/100 mg therapy pack; Take 3 tablets by mouth 2 times daily for 5 days (Take 2 Nirmatrelvir tablets and 1 Ritonavir tablet twice daily for 5 days)  Dispense: 30 tablet; Refill: 0  Throat pain  - Streptococcus A Rapid Screen w/Reflex to PCR - Clinic Collect  - Group A Streptococcus PCR Throat Swab  Nausea  - ondansetron (ZOFRAN ODT) ODT tab 4 mg  Upper abdominal pain    Assessment:  Onset of acute fever runny nose throat pain and upset stomach, nausea and vomiting Monday evening. Positive covid on Tuesday at home.   Has had some nausea/vomiting. No diarrhea. Today just some dry heaves.   Pain in the upper abdomen, right of center. Tender on exam just off the rib margin to the right of epigastric. Worse with movement and cough and deep breath. No definite peritoneal signs, no pain at rest.   Differential includes  strained muscle from vomiting, strep, gastritis/PUD, duct stone, pancreatitis, pneumonia.  Does not have gall bladder. Not typical type of pain for duct stone, pancreatitis, peritonitis. Lungs clear. Sats good. RST negative.   Nausea improved with zofran in the office.   I suspect a strained muscle upper abdomen from vomiting yesterday.   We discussed further evaluation including imaging, labs, IV fluids at either the ER or [facility].   He felt he could take in adequate liquids and crackers today and advance as tolerated since the nausea was better today, pain less severe, and zofran had helped even more.   We elected to not pursue advanced care for now and see how things go. Close observation and to the ER if worse in any way.    We had a long discussion of treatment options for Covid including remdesivir which he did not meet outpatient criteria for and molnunipavir which had less efficacy and still side effects - and paxlovid which has some increased GI side effects. He would also have an interaction with  amlodipine.  We elected to start paxlovid at normal dose - he has normal renal function 12/23 - one time dose this afternoon, start twice daily tomorrow if tolerated. Decrease amlodipine to half dose daily and monitor blood pressure. .     Plan:  Paxlovid twice daily as directed for 5 days.   Decrease amlodipine to half tab daily for the 5 days of paxlovid and 3 days after.   Ondansetron (zofran) under the tongue every 8-12 hours if needed for nausea  Fluids, bland diet as tolerated. Only small amounts at a time, but more often - to trickle it through the stomach and not distend it.   May take tylenol as needed  To the ER if not keeping liquids or food done, more weakness or abdominal pain is worse.     I discussed red flag symptoms, return precautions to clinic/ER and follow up care with patient/parent.  Expected clinical course, symptomatic cares advised. Questions answered. Patient/parent amenable with plan.  Time: total time on day of visit 45 minutes spent in chart review, obtaining patient history and physical exam, reviewing labs, medication management, shared decision making and coordination of care.     Subjective:     Jorge Dupree is a 83 year old male with a positive home covid test who presents for evaluation of abdominal pain and to discuss treatment options.   Onset of illness 1/8/24 with fever Tm101, ST, HA and runny nose.   He also had a very upset stomach and vomited twice after trying to take tylenol Monday evening and Tuesday morning.   Nausea has persisted.   Yesterday (Tuesday) he had RUQ pain that was quite sharp. It has been off and on but generally improving today.   No further vomiting though had dry heaves this morning. Still nauseated. Able to eat crackers and drink water and flat 7Up. No diarrhea. Normal BM yesterday   No chest pain or shortness of breath. No cough. No headache, leg swelling or pain, rash.   Occasional heart burn (longstanding), takes TUMs as needed  His wife developed  fever, cough and runny nose on Sunday evening and tested positive for covid Monday on a home test. She started Paxlovid Monday or Tuesday morning and is tolerating it well.   His test on Monday was negative.   His covid test on Tuesday at home was positive.   He is s/p celso  He called for a virtual visit yesterday for paxlovid but due to the abdominal pain it was recommended that he go to the ER. He does not drive in the dark and so waited until today.   Then he had less pain and no further vomiting so came here instead.       Allergies   Allergen Reactions    Alpha Blocker Quinazolines      rash     Current Outpatient Medications   Medication    acetaminophen (TYLENOL) 500 MG tablet    amLODIPine (NORVASC) 5 MG tablet    docusate sodium (COLACE) 100 MG capsule    nirmatrelvir and ritonavir (PAXLOVID) 300 mg/100 mg therapy pack    oxybutynin (DITROPAN) 5 MG tablet    polyethylene glycol (MIRALAX) 17 GM/Dose powder    tamsulosin (FLOMAX) 0.4 MG capsule    triamcinolone (KENALOG) 0.1 % external cream     No current facility-administered medications for this visit.     Patient Active Problem List   Diagnosis    Essential hypertension, benign    Lumbago    Hearing loss    Hypertrophy of prostate with urinary obstruction    CARDIOVASCULAR SCREENING; LDL GOAL LESS THAN 160    Advanced directives, counseling/discussion    Urgency-frequency syndrome    Elevated prostate specific antigen (PSA)    Pain in joint, shoulder region    Biceps tendonitis    Lateral epicondylitis    Decreased range of motion of both hips    Sagittal plane imbalance       Objective:     /71   Pulse 66   Temp 98.5  F (36.9  C) (Oral)   Resp 16   Wt 75.2 kg (165 lb 12.8 oz)   SpO2 98%   BMI 23.62 kg/m      Physical    General Appearance: Alert, pleasant, no distress, AVSS  Head: Normocephalic, without obvious abnormality, atraumatic  Eyes: Conjunctivae are normal.   Ears: Normal TMs and external ear canals, both ears. Some wax partially  obscuring bilaterally but the portion of TM seen was normal.   Nose: No significant congestion. Scant rhinorrhea  Throat: Throat is red.  No exudate.  No vesicular lesions  Neck: No adenopathy  Lungs: Clear to auscultation bilaterally, respirations unlabored  Heart: Regular rate and rhythm  Abdomen: Soft, non-distended, tender in the upper abdomen, just off the right anterior lower rib margin and to the right of the epigastric area. No masses, no organomegaly  Extremities: No lower extremity edema  Skin: Skin color, texture, turgor normal, no rashes or lesions  Psychiatric: Patient has a normal mood and affect.         Results for orders placed or performed in visit on 01/10/24   Streptococcus A Rapid Screen w/Reflex to PCR - Clinic Collect     Status: Normal    Specimen: Throat; Swab   Result Value Ref Range    Group A Strep antigen Negative Negative   Group A Streptococcus PCR Throat Swab     Status: Normal    Specimen: Throat; Swab   Result Value Ref Range    Group A strep by PCR Not Detected Not Detected    Narrative    The Xpert Xpress Strep A test, performed on the [employer], is a rapid, qualitative in vitro diagnostic test for the detection of Streptococcus pyogenes (Group A ß-hemolytic Streptococcus, Strep A) in throat swab specimens from patients with signs and symptoms of pharyngitis. The Xpert Xpress Strep A test can be used as an aid in the diagnosis of Group A Streptococcal pharyngitis. The assay is not intended to monitor treatment for Group A Streptococcus infections. The Xpert Xpress Strep A test utilizes an automated real-time polymerase chain reaction (PCR) to detect Streptococcus pyogenes DNA.       This note has been dictated in part using voice recognition software.  Any grammatical or context distortions are unintentional and inherent to the software.  Please feel free to contact me directly for clarification if needed.

## 2024-01-24 ENCOUNTER — PATIENT OUTREACH (OUTPATIENT)
Dept: CARE COORDINATION | Facility: CLINIC | Age: 84
End: 2024-01-24
Payer: MEDICARE

## 2024-01-29 ENCOUNTER — OFFICE VISIT (OUTPATIENT)
Dept: PHYSICAL MEDICINE AND REHAB | Facility: CLINIC | Age: 84
End: 2024-01-29
Payer: MEDICARE

## 2024-01-29 VITALS — DIASTOLIC BLOOD PRESSURE: 67 MMHG | HEART RATE: 73 BPM | SYSTOLIC BLOOD PRESSURE: 136 MMHG

## 2024-01-29 DIAGNOSIS — M25.561 CHRONIC PAIN OF BOTH KNEES: ICD-10-CM

## 2024-01-29 DIAGNOSIS — M62.519: ICD-10-CM

## 2024-01-29 DIAGNOSIS — M43.8X9 SAGITTAL PLANE IMBALANCE: ICD-10-CM

## 2024-01-29 DIAGNOSIS — M25.562 CHRONIC PAIN OF BOTH KNEES: ICD-10-CM

## 2024-01-29 DIAGNOSIS — M16.0 PRIMARY OSTEOARTHRITIS OF BOTH HIPS: ICD-10-CM

## 2024-01-29 DIAGNOSIS — M51.369 DDD (DEGENERATIVE DISC DISEASE), LUMBAR: ICD-10-CM

## 2024-01-29 DIAGNOSIS — G89.29 CHRONIC PAIN OF BOTH KNEES: ICD-10-CM

## 2024-01-29 DIAGNOSIS — M79.18 GLUTEAL PAIN: Primary | ICD-10-CM

## 2024-01-29 PROCEDURE — 99214 OFFICE O/P EST MOD 30 MIN: CPT | Performed by: PHYSICAL MEDICINE & REHABILITATION

## 2024-01-29 ASSESSMENT — PAIN SCALES - GENERAL: PAINLEVEL: MILD PAIN (3)

## 2024-01-29 NOTE — PROGRESS NOTES
Assessment/Plan:      Jorge was seen today for back pain.    Diagnoses and all orders for this visit:    Gluteal pain    Primary osteoarthritis of both hips    DDD (degenerative disc disease), lumbar    Chronic pain of both knees    Sagittal plane imbalance    Atrophy of muscle of shoulder, unspecified laterality         Assessment: Pleasant 83 year old male  with a history of prostate cancer, hypertension with:     1.  Chronic bilateral gluteal pain for the past couple of years lateral gluteal region.  Likely multifactorial.  He has moderate hip osteoarthritis on plain films along with mild degenerative changes of the lumbar spine which results in what appears to be sagittal plane imbalance on exam.  Has decreased range of motion of the hips.  Significant myofascial pain in the gluteal region.  Left hip is markedly improved with physical therapy right hip is moderately improved but now livable.      2.  Chronic intermittent bilateral knee pain likely related to his hip pain and sagittal plane imbalance as well although cannot exclude knee osteoarthritis.  Improved with physical therapy.     3.  Atrophy of the parascapular muscles.  This is of unknown etiology.  Could be related to normal aging cannot exclude a relatively mild myopathy, likely genetic as he states his father had similar appearance.    Discussion:    1.  I discussed the diagnosis and treatment options.  We discussed options of further imaging such as MRI of the right hip as most of his pain is on the right hip today.  We discussed steroid injections as well, further therapy or lab work to evaluate for underlying myopathy.  He has had an improvement in his pain and tolerating his symptoms at this point.  He he tells me he has not tolerated MRIs well in the past.  Not interested in any MRI and not interested in any lab work at this time.    2.  If his pain worsens I would recommend a right hip injection under ultrasound guidance.  Hold off for  now.    3.  Would consider ESR, CRP and CK level if he has progressive pain or weakness through her shoulders or pelvic stabilizers.    4.  Continue home exercises from physical therapy.    5.  Follow-up with me as needed.     Over 30 minutes were spent on the date of the encounter performing chart review, patient visit and documentation in addition to any procedure.    It was our pleasure caring for your patient today, if there any questions or concerns please do not hesitate to contact us.      Subjective:   Patient ID: Jorge Dupree is a 83 year old male.    History of Present Illness: Patient presents for follow-up of bilateral gluteal pain decreased range of motion of the hips as well as knee pain.  Symptoms have improved since last visit.  Up to 50% improvement overall.  His left hip pain has dramatically improved only has intermittent pain in the left gluteal region but has fairly constant pain over the right gluteal region deep within the pelvis worse with standing walking or lifting.  Better with movement home exercises from PT which he tries to do 1-2 times daily.  His knees are doing well with the  home exercises as well.  Symptoms seem to wax and wane.  Pain is a 4/10 at worst 3/10 today 1/10 at best.  Plain films have been done since last visit.    During exam I also noticed some thinning throughout the parascapular region.  He tells me his father had similar appearance.  Has bothered him some but overall does not have any issues with swallowing or any weakness through his arms.    Labs: BMP normal sodium 140 potassium 4.1, creatinine 0.67 calcium 9.0 from December 1, 2023.  TSH from last February 2023 normal 0.97.  I do not see CK level.    Imaging: Plain films of the knee were personally reviewed showing mild medial compartment osteoarthritis of the right knee and mild patellofemoral compartment degenerative changes of the left knee.  Images personally reviewed.    Mild to moderate osteoarthritis of  both hips on plain films.  Flattening of the femoral head neck junction bilaterally.    Plain films of the lumbar spine reviewed again today as well reviewing moderate degenerative disc height loss throughout the lumbar spine with moderate facet arthropathy L4-5 L5-S1.    Review of Systems: Pertinent positives: None.  Pertinent negatives: No numbness, tingling or weakness.  No bowel or bladder incontinence.  No urinary retention.  No fevers, unintentional weight loss, balance changes, headaches, frequent falling, difficulty swallowing, or coordination difficulties.  All others reviewed are negative.    Past Medical History:   Diagnosis Date    BPH (benign prostatic hypertrophy)     Essential hypertension, benign     Hypertension     Malignant neoplasm of prostate (H) 1/16/2019    Medical history non-contributory     Prostate cancer (H)        The following portions of the patient's history were reviewed and updated as appropriate: allergies, current medications, past family history, past medical history, past social history, past surgical history and problem list.           Objective:   Physical Exam:    /67   Pulse 73   There is no height or weight on file to calculate BMI.      General: Alert and oriented with normal affect. Attention, knowledge, memory, and language are intact. No acute distress.   Eyes: Sclerae are clear.  Respirations: Unlabored.    Gait:  Nonantalgic  Atrophy of the supraspinatus and infraspinatus bilaterally.  Tenderness over the right gluteal region.  Significantly reduced range of motion bilateral hips in internal and external rotation.  Positive Nena on the right for hip pain negative on the left although significantly limited range of motion of the left hip.  Sensation is intact to light touch throughout the  lower extremities.       Manual muscle testing reveals:  Right /Left out of 5     5/5 hip flexors  5/5 knee flexors  5/5 knee extensors  5/5 ankle plantar flexors  5/5 ankle  dorsiflexors  5/5  EHL

## 2024-01-29 NOTE — LETTER
1/29/2024         RE: Jorge Dupree  [address] [facility] [place] [address]        Dear Colleague,    Thank you for referring your patient, Jorge Dupree, to the [facility]. Please see a copy of my visit note below.    Assessment/Plan:      Jorge was seen today for back pain.    Diagnoses and all orders for this visit:    Gluteal pain    Primary osteoarthritis of both hips    DDD (degenerative disc disease), lumbar    Chronic pain of both knees    Sagittal plane imbalance    Atrophy of muscle of shoulder, unspecified laterality         Assessment: Pleasant 83 year old male  with a history of prostate cancer, hypertension with:     1.  Chronic bilateral gluteal pain for the past couple of years lateral gluteal region.  Likely multifactorial.  He has moderate hip osteoarthritis on plain films along with mild degenerative changes of the lumbar spine which results in what appears to be sagittal plane imbalance on exam.  Has decreased range of motion of the hips.  Significant myofascial pain in the gluteal region.  Left hip is markedly improved with physical therapy right hip is moderately improved but now livable.      2.  Chronic intermittent bilateral knee pain likely related to his hip pain and sagittal plane imbalance as well although cannot exclude knee osteoarthritis.  Improved with physical therapy.     3.  Atrophy of the parascapular muscles.  This is of unknown etiology.  Could be related to normal aging cannot exclude a relatively mild myopathy, likely genetic as he states his father had similar appearance.    Discussion:    1.  I discussed the diagnosis and treatment options.  We discussed options of further imaging such as MRI of the right hip as most of his pain is on the right hip today.  We discussed steroid injections as well, further therapy or lab work to evaluate for underlying myopathy.  He has had an improvement in his pain and tolerating his symptoms at this point.  He he  tells me he has not tolerated MRIs well in the past.  Not interested in any MRI and not interested in any lab work at this time.    2.  If his pain worsens I would recommend a right hip injection under ultrasound guidance.  Hold off for now.    3.  Would consider ESR, CRP and CK level if he has progressive pain or weakness through her shoulders or pelvic stabilizers.    4.  Continue home exercises from physical therapy.    5.  Follow-up with me as needed.     Over 30 minutes were spent on the date of the encounter performing chart review, patient visit and documentation in addition to any procedure.    It was our pleasure caring for your patient today, if there any questions or concerns please do not hesitate to contact us.      Subjective:   Patient ID: Jorge Dupree is a 83 year old male.    History of Present Illness: Patient presents for follow-up of bilateral gluteal pain decreased range of motion of the hips as well as knee pain.  Symptoms have improved since last visit.  Up to 50% improvement overall.  His left hip pain has dramatically improved only has intermittent pain in the left gluteal region but has fairly constant pain over the right gluteal region deep within the pelvis worse with standing walking or lifting.  Better with movement home exercises from PT which he tries to do 1-2 times daily.  His knees are doing well with the  home exercises as well.  Symptoms seem to wax and wane.  Pain is a 4/10 at worst 3/10 today 1/10 at best.  Plain films have been done since last visit.    During exam I also noticed some thinning throughout the parascapular region.  He tells me his father had similar appearance.  Has bothered him some but overall does not have any issues with swallowing or any weakness through his arms.    Labs: BMP normal sodium 140 potassium 4.1, creatinine 0.67 calcium 9.0 from December 1, 2023.  TSH from last February 2023 normal 0.97.  I do not see CK level.    Imaging: Plain films of the  knee were personally reviewed showing mild medial compartment osteoarthritis of the right knee and mild patellofemoral compartment degenerative changes of the left knee.  Images personally reviewed.    Mild to moderate osteoarthritis of both hips on plain films.  Flattening of the femoral head neck junction bilaterally.    Plain films of the lumbar spine reviewed again today as well reviewing moderate degenerative disc height loss throughout the lumbar spine with moderate facet arthropathy L4-5 L5-S1.    Review of Systems: Pertinent positives: None.  Pertinent negatives: No numbness, tingling or weakness.  No bowel or bladder incontinence.  No urinary retention.  No fevers, unintentional weight loss, balance changes, headaches, frequent falling, difficulty swallowing, or coordination difficulties.  All others reviewed are negative.    Past Medical History:   Diagnosis Date     BPH (benign prostatic hypertrophy)      Essential hypertension, benign      Hypertension      Malignant neoplasm of prostate (H) 1/16/2019     Medical history non-contributory      Prostate cancer (H)        The following portions of the patient's history were reviewed and updated as appropriate: allergies, current medications, past family history, past medical history, past social history, past surgical history and problem list.           Objective:   Physical Exam:    /67   Pulse 73   There is no height or weight on file to calculate BMI.      General: Alert and oriented with normal affect. Attention, knowledge, memory, and language are intact. No acute distress.   Eyes: Sclerae are clear.  Respirations: Unlabored.    Gait:  Nonantalgic  Atrophy of the supraspinatus and infraspinatus bilaterally.  Tenderness over the right gluteal region.  Significantly reduced range of motion bilateral hips in internal and external rotation.  Positive Nena on the right for hip pain negative on the left although significantly limited range of motion  of the left hip.  Sensation is intact to light touch throughout the  lower extremities.       Manual muscle testing reveals:  Right /Left out of 5     5/5 hip flexors  5/5 knee flexors  5/5 knee extensors  5/5 ankle plantar flexors  5/5 ankle dorsiflexors  5/5  EHL       Again, thank you for allowing me to participate in the care of your patient.        Sincerely,        Bryan Mcgrath, DO

## 2024-01-29 NOTE — PATIENT INSTRUCTIONS
Continue with your physical therapy exercises  We can look at doing a hip injection if your right sided pain worsens  We can check some blood tests to see if your shoulder blade muscle thinning is normal aging or related to something else (primary muscle issue)

## 2024-03-24 ENCOUNTER — HEALTH MAINTENANCE LETTER (OUTPATIENT)
Age: 84
End: 2024-03-24

## 2024-05-10 DIAGNOSIS — I10 ESSENTIAL HYPERTENSION, BENIGN: ICD-10-CM

## 2024-05-10 RX ORDER — AMLODIPINE BESYLATE 5 MG/1
TABLET ORAL
Qty: 90 TABLET | Refills: 1 | Status: SHIPPED | OUTPATIENT
Start: 2024-05-10

## 2024-05-20 ENCOUNTER — LAB (OUTPATIENT)
Dept: INFUSION THERAPY | Facility: HOSPITAL | Age: 84
End: 2024-05-20
Attending: RADIOLOGY
Payer: MEDICARE

## 2024-05-20 DIAGNOSIS — C61 MALIGNANT NEOPLASM OF PROSTATE (H): ICD-10-CM

## 2024-05-20 LAB — PSA SERPL DL<=0.01 NG/ML-MCNC: 0.11 NG/ML

## 2024-05-20 PROCEDURE — 36415 COLL VENOUS BLD VENIPUNCTURE: CPT

## 2024-05-20 PROCEDURE — 84153 ASSAY OF PSA TOTAL: CPT

## 2024-05-22 ENCOUNTER — OFFICE VISIT (OUTPATIENT)
Dept: RADIATION ONCOLOGY | Facility: CLINIC | Age: 84
End: 2024-05-22
Attending: RADIOLOGY
Payer: MEDICARE

## 2024-05-22 VITALS
TEMPERATURE: 98.2 F | HEART RATE: 73 BPM | SYSTOLIC BLOOD PRESSURE: 130 MMHG | DIASTOLIC BLOOD PRESSURE: 64 MMHG | RESPIRATION RATE: 16 BRPM | BODY MASS INDEX: 24.16 KG/M2 | WEIGHT: 169.6 LBS | OXYGEN SATURATION: 98 %

## 2024-05-22 DIAGNOSIS — C61 MALIGNANT NEOPLASM OF PROSTATE (H): Primary | ICD-10-CM

## 2024-05-22 PROCEDURE — G0463 HOSPITAL OUTPT CLINIC VISIT: HCPCS | Performed by: RADIOLOGY

## 2024-05-22 PROCEDURE — 99214 OFFICE O/P EST MOD 30 MIN: CPT | Performed by: RADIOLOGY

## 2024-05-22 PROCEDURE — G2211 COMPLEX E/M VISIT ADD ON: HCPCS | Performed by: RADIOLOGY

## 2024-05-22 ASSESSMENT — PAIN SCALES - GENERAL: PAINLEVEL: NO PAIN (0)

## 2024-05-22 NOTE — PROGRESS NOTES
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      TYPE OF RADIATION THERAPY ADMINISTERED:  7942 cGy in 45 treatments given from 2/19/2019-5/2/2019.      INTERVAL SINCE COMPLETION OF RADIATION THERAPY: 5 years.      SUBJECTIVE:  Mr. Dupree is a 83 y.o. male who has been in his usual state of good health until recently.  The patient presented with history of abnormal elevation PSA from 4.1 in 5/2015 to 7 in 9/2016 to 8.48/2017 and to 10.2 in 8/2018.  The patient had some moderate urinary urgency over the past a few years and denies any other symptoms at the time of evaluation.  His initial rectal exam is reviewed 2+ enlarged prostate gland with left lobe firmness.  Initial MRI prostate on 9/20/2018 reviewed PIRAD 5 lesion in the left posterior lateral peripheral zone highly suspicious for malignancy.  The estimated prostate volume was 51 cc.  The lesion makes broad capsular contact with the blurring at the neurovascular bundle origin.  No evidence of ivania and skeletal metastasis.  He underwent transrectal ultrasound study and biopsy on 11/1/2018.  The pathology revealed the prostatic adenocarcinoma, [place] grade 4+3 = 7 and 3+4 = 7 involving up to 90% biopsy tissue in 15/15 cores bilaterally.  He underwent staging workup including CT abdomen pelvics and the bone scan which showed no radiographic evidence of ivania and systemic metastasis. Patient received short-term hormone therapy and definitive external beam radiation therapy for his prostate cancer and had radiation therapy in our clinic with a total dose of 7942 cGy in 45 treatments given from 2/19/2019-5/2/2019.  The patient did have significant dysuria before and during the radiation  therapy for which he required penile clamp to maintain his bladder full during the therapy.     The patient has been gradually recovering well since completion of the radiation therapy.  He has been followed with physical therapy with significant bladder control improvement.  His bladder function continue to stable since last visit.   He is AUA score is 14/35.  He otherwise denies any bowel irritations.  He is here for routine postradiation therapy office follow-up.     Medications were reviewed and are up to date on EPIC.    The following portions of the patient's history were reviewed and updated as appropriate: allergies, current medications, past family history, past medical history, past social history, past surgical history and problem list.    Review of Systems:      General  Constitutional  Constitutional (WDL): All constitutional elements are within defined limits  EENT  Eye Disorders  Eye Disorder (WDL): All eye disorder elements are within defined limits (wears glasses)  Ear Disorders  Ear Disorder (WDL): All ear disorder elements are within defined limits  Respiratory  Respiratory  Respiratory (WDL): All respiratory elements are within defined limits  Cardiovascular  Cardiovascular  Cardiovascular (WDL): Exceptions to WDL (no pacemaker)  Edema: Yes  Edema Limbs: 5 - 10% inter-limb discrepancy in volume or circumference at point of greatest visible difference OR swelling or obscuration of anatomic architecture on close inspection (on/off right ankle d/t varicose veins)  Gastrointestinal  Gastrointestinal  Gastrointestinal (WDL): Exceptions to WDL  Constipation: Occasional or intermittent symptoms OR occasional use of stool softeners, laxatives, dietary modification, or enema  Musculoskeletal  Musculoskeletal and Connective Tissue Disorders  Musculoskeletal & Connective (WDL): All musculoskeletal & connective elements are within defined limits  Integumentary  Integumentary  Integumentary (WDL): All  integumentary elements are within defined limits  Neurological  Neurosensory  Neurosensory (WDL): Exceptions to WDL  Ataxia: Asymptomatic OR clinical or diagnostic observations only OR intervention not indicated  Genitourinary/Reproductive  Genitourinary  Genitourinary (WDL): Exceptions to WDL  Urinary Frequency: Present (nocturia x3)  Lymphatic  Lymph System Disorders  Lymph (WDL): All lymph elements are within defined limits  Pain  Pain Score: No Pain (0)  AUA Assessment  Over the Past Month  How often have you had a sensation of not emptying your bladder completely after you have finished urinating: About half the time: Score: 3  How often have you had to urinate again less than 2 hours after you finshed urinating: More than half the time: Score: 4  How often have you found you stopped and started again several times when you urinated: Less than half the time: Score: 2  How often have you found it difficult to postpone urine: Less than half the time: Score: 2  How often have you had a weak urinary stream: Not at all: Score: 0  How often have you had to push or starin to begin uriation: Not at all: Score: 0  How many times do you most typically get up to urinate from the time you went to bed at night until the time you got up in the morning?: 3 times, Score: 3  Total AUA Score: Degree of Severity: 8-19 is Moderate (14)  If you had to spend the rest of your life with your urinary condition just the way it is now, how would you feel: Mostly Satisfied                                                           Accompanied by  Accompanied By: spouse    Objective:      PHYSICAL EXAMINATION:    /64 (BP Location: Right arm, Patient Position: Sitting, Cuff Size: Adult Regular)   Pulse 73   Temp 98.2  F (36.8  C) (Oral)   Resp 16   Wt 76.9 kg (169 lb 9.6 oz)   SpO2 98%   BMI 24.16 kg/m      Gen: Alert, in NAD  Eyes: PERRL, EOMI, sclera anicteric  Neck: Supple, full ROM, no LAD  Pulm: No wheezing, stridor or  respiratory distress  CV: Well-perfused, no cyanosis, no pedal edema  Back: No step-offs or pain to palpation along the thoracolumbar spine  Rectal: Deferred  : Deferred  Musculoskeletal: Normal muscle bulk and tone  Skin: Normal color and turgor  Neurologic: A/Ox3, CN II-XII intact, normal gait and station  Psychiatric: Appropriate mood and affect     PSA   Date Value Ref Range Status   01/23/2014 4.43 (H) 0 - 4 ug/L Final     Comment:     PSA results are about 7% lower than our prior method due to a methodology   change   on August 30, 2011.   09/30/2013 12.80 (H) 0 - 4 ug/L Final   09/04/2012 2.73 0 - 4 ug/L Final   08/16/2011 1.90 0 - 4 ug/L Final   07/30/2010 1.61 0 - 4 ug/L Final   07/27/2009 2.00 0 - 4 ug/L Final   06/24/2008 6.01 (H) 0 - 4 ug/L Final   06/28/2007 3.68 0 - 4 ug/L Final   08/30/2006 2.61 0 - 4 ug/L Final   01/29/2005 1.95 0 - 4 ug/L Final   11/13/2003 2.7 0 - 4 ug/L Final     Prostate Specific Antigen Screen   Date Value Ref Range Status   12/01/2020 0.1 0.0 - 6.5 ng/mL Final   05/26/2020 <0.1 0.0 - 6.5 ng/mL Final   12/02/2019 <0.1 0.0 - 6.5 ng/mL Final   06/03/2019 <0.1 0.0 - 6.5 ng/mL Final   08/21/2018 10.2 (H) 0.0 - 6.5 ng/mL Final   08/10/2017 8.4 (H) 0.0 - 6.5 ng/mL Final   09/28/2016 6.7 (H) 0.0 - 6.5 ng/mL Final   05/14/2015 4.1 0.0 - 6.5 ng/mL Final   09/23/2014 4.7 0.0 - 6.5 ng/mL Final   07/23/2014 3.8 0.0 - 6.5 ng/mL Final     PSA Tumor Marker   Date Value Ref Range Status   05/20/2024 0.11 ng/mL Final     Comment:     No reference ranges have been established for patients over 80 years.   08/17/2023 0.10 ng/mL Final     Comment:     No reference ranges have been established for patients over 80 years.   04/18/2022 0.11 0.00 - 6.50 ug/L Final      Impression     The patient is a 83-year-old gentleman with a diagnosis of unfavorable intermediate risk prostate cancer status post short-term hormone therapy and a definitive external beam radiation therapy 5 years ago.  Patient has  been recovering well with no clinical evidence of cancer recurrence.     Assessment & Plan:       1.  Continue pelvic floor exercises as recommended from physical therapy to improve his bladder function.  He is scheduled to return to radiation oncology in 12 months for another office follow-up and PSA.  The patient would like to alternate his follow-up for prostate cancer with urology and radiation oncology.       2.  Continue follow-up with Dr. Alejandro, urology and Henri, PCP as planned.     Face to face time 35 minutes with > 75% spent on consultation, education and coordination of care.      Lorenza Merino MD  Department of Radiation Oncology   [facility]  Tel: [phone]  Page: [phone]    [facility]  [address] [place] [address]     [address]   [place] [address]    CC:  Patient Care Team:  Denton Head MD as PCP - General (Internal Medicine)  Lorenza Merino MD as Assigned Cancer Care Provider  Denton Head MD as Assigned PCP  Itz Alejandro MD as MD (Urology)  Bryan Mcgrath DO as Assigned Neuroscience Provider

## 2024-05-22 NOTE — LETTER
5/22/2024         RE: Jorge Dupree  [address] [address]        Dear Colleague,    Thank you for referring your patient, Jorge Dupree, to the [facility] RADIATION ONCOLOGY [place]. Please see a copy of my visit note below.    [facility] Radiation Oncology Follow Up     Patient: Jorge Dupree  MRN: [id number]  Date of Service: 05/22/2024       DISEASE TREATED: Unfavorable intermediate risk prostate cancer, clinical stage T2cN0 M0, [place] grade 4+3=7 and 3+4 =7 involving up to 90% of biopsy tissue bilaterally, and pretherapy PSA of 10.2.      TYPE OF RADIATION THERAPY ADMINISTERED:  7942 cGy in 45 treatments given from 2/19/2019-5/2/2019.      INTERVAL SINCE COMPLETION OF RADIATION THERAPY: 5 years.      SUBJECTIVE:  Mr. Dupree is a 83 y.o. male who has been in his usual state of good health until recently.  The patient presented with history of abnormal elevation PSA from 4.1 in 5/2015 to 7 in 9/2016 to 8.48/2017 and to 10.2 in 8/2018.  The patient had some moderate urinary urgency over the past a few years and denies any other symptoms at the time of evaluation.  His initial rectal exam is reviewed 2+ enlarged prostate gland with left lobe firmness.  Initial MRI prostate on 9/20/2018 reviewed PIRAD 5 lesion in the left posterior lateral peripheral zone highly suspicious for malignancy.  The estimated prostate volume was 51 cc.  The lesion makes broad capsular contact with the blurring at the neurovascular bundle origin.  No evidence of ivania and skeletal metastasis.  He underwent transrectal ultrasound study and biopsy on 11/1/2018.  The pathology revealed the prostatic adenocarcinoma, [place] grade 4+3 = 7 and 3+4 = 7 involving up to 90% biopsy tissue in 15/15 cores bilaterally.  He underwent staging workup including CT abdomen pelvics and the bone scan which showed no radiographic evidence of ivania and systemic metastasis. Patient received short-term hormone therapy and definitive  external beam radiation therapy for his prostate cancer and had radiation therapy in our clinic with a total dose of 7942 cGy in 45 treatments given from 2/19/2019-5/2/2019.  The patient did have significant dysuria before and during the radiation therapy for which he required penile clamp to maintain his bladder full during the therapy.     The patient has been gradually recovering well since completion of the radiation therapy.  He has been followed with physical therapy with significant bladder control improvement.  His bladder function continue to stable since last visit.   He is AUA score is 14/35.  He otherwise denies any bowel irritations.  He is here for routine postradiation therapy office follow-up.     Medications were reviewed and are up to date on EPIC.    The following portions of the patient's history were reviewed and updated as appropriate: allergies, current medications, past family history, past medical history, past social history, past surgical history and problem list.    Review of Systems:      General  Constitutional  Constitutional (WDL): All constitutional elements are within defined limits  EENT  Eye Disorders  Eye Disorder (WDL): All eye disorder elements are within defined limits (wears glasses)  Ear Disorders  Ear Disorder (WDL): All ear disorder elements are within defined limits  Respiratory  Respiratory  Respiratory (WDL): All respiratory elements are within defined limits  Cardiovascular  Cardiovascular  Cardiovascular (WDL): Exceptions to WDL (no pacemaker)  Edema: Yes  Edema Limbs: 5 - 10% inter-limb discrepancy in volume or circumference at point of greatest visible difference OR swelling or obscuration of anatomic architecture on close inspection (on/off right ankle d/t varicose veins)  Gastrointestinal  Gastrointestinal  Gastrointestinal (WDL): Exceptions to WDL  Constipation: Occasional or intermittent symptoms OR occasional use of stool softeners, laxatives, dietary  modification, or enema  Musculoskeletal  Musculoskeletal and Connective Tissue Disorders  Musculoskeletal & Connective (WDL): All musculoskeletal & connective elements are within defined limits  Integumentary  Integumentary  Integumentary (WDL): All integumentary elements are within defined limits  Neurological  Neurosensory  Neurosensory (WDL): Exceptions to WDL  Ataxia: Asymptomatic OR clinical or diagnostic observations only OR intervention not indicated  Genitourinary/Reproductive  Genitourinary  Genitourinary (WDL): Exceptions to WDL  Urinary Frequency: Present (nocturia x3)  Lymphatic  Lymph System Disorders  Lymph (WDL): All lymph elements are within defined limits  Pain  Pain Score: No Pain (0)  AUA Assessment  Over the Past Month  How often have you had a sensation of not emptying your bladder completely after you have finished urinating: About half the time: Score: 3  How often have you had to urinate again less than 2 hours after you finshed urinating: More than half the time: Score: 4  How often have you found you stopped and started again several times when you urinated: Less than half the time: Score: 2  How often have you found it difficult to postpone urine: Less than half the time: Score: 2  How often have you had a weak urinary stream: Not at all: Score: 0  How often have you had to push or starin to begin uriation: Not at all: Score: 0  How many times do you most typically get up to urinate from the time you went to bed at night until the time you got up in the morning?: 3 times, Score: 3  Total AUA Score: Degree of Severity: 8-19 is Moderate (14)  If you had to spend the rest of your life with your urinary condition just the way it is now, how would you feel: Mostly Satisfied                                                           Accompanied by  Accompanied By: spouse    Objective:      PHYSICAL EXAMINATION:    /64 (BP Location: Right arm, Patient Position: Sitting, Cuff Size: Adult  Regular)   Pulse 73   Temp 98.2  F (36.8  C) (Oral)   Resp 16   Wt 76.9 kg (169 lb 9.6 oz)   SpO2 98%   BMI 24.16 kg/m      Gen: Alert, in NAD  Eyes: PERRL, EOMI, sclera anicteric  Neck: Supple, full ROM, no LAD  Pulm: No wheezing, stridor or respiratory distress  CV: Well-perfused, no cyanosis, no pedal edema  Back: No step-offs or pain to palpation along the thoracolumbar spine  Rectal: Deferred  : Deferred  Musculoskeletal: Normal muscle bulk and tone  Skin: Normal color and turgor  Neurologic: A/Ox3, CN II-XII intact, normal gait and station  Psychiatric: Appropriate mood and affect     PSA   Date Value Ref Range Status   01/23/2014 4.43 (H) 0 - 4 ug/L Final     Comment:     PSA results are about 7% lower than our prior method due to a methodology   change   on August 30, 2011.   09/30/2013 12.80 (H) 0 - 4 ug/L Final   09/04/2012 2.73 0 - 4 ug/L Final   08/16/2011 1.90 0 - 4 ug/L Final   07/30/2010 1.61 0 - 4 ug/L Final   07/27/2009 2.00 0 - 4 ug/L Final   06/24/2008 6.01 (H) 0 - 4 ug/L Final   06/28/2007 3.68 0 - 4 ug/L Final   08/30/2006 2.61 0 - 4 ug/L Final   01/29/2005 1.95 0 - 4 ug/L Final   11/13/2003 2.7 0 - 4 ug/L Final     Prostate Specific Antigen Screen   Date Value Ref Range Status   12/01/2020 0.1 0.0 - 6.5 ng/mL Final   05/26/2020 <0.1 0.0 - 6.5 ng/mL Final   12/02/2019 <0.1 0.0 - 6.5 ng/mL Final   06/03/2019 <0.1 0.0 - 6.5 ng/mL Final   08/21/2018 10.2 (H) 0.0 - 6.5 ng/mL Final   08/10/2017 8.4 (H) 0.0 - 6.5 ng/mL Final   09/28/2016 6.7 (H) 0.0 - 6.5 ng/mL Final   05/14/2015 4.1 0.0 - 6.5 ng/mL Final   09/23/2014 4.7 0.0 - 6.5 ng/mL Final   07/23/2014 3.8 0.0 - 6.5 ng/mL Final     PSA Tumor Marker   Date Value Ref Range Status   05/20/2024 0.11 ng/mL Final     Comment:     No reference ranges have been established for patients over 80 years.   08/17/2023 0.10 ng/mL Final     Comment:     No reference ranges have been established for patients over 80 years.   04/18/2022 0.11 0.00 - 6.50  "ug/L Final      Impression     The patient is a 83-year-old gentleman with a diagnosis of unfavorable intermediate risk prostate cancer status post short-term hormone therapy and a definitive external beam radiation therapy 5 years ago.  Patient has been recovering well with no clinical evidence of cancer recurrence.     Assessment & Plan:       1.  Continue pelvic floor exercises as recommended from physical therapy to improve his bladder function.  He is scheduled to return to radiation oncology in 12 months for another office follow-up and PSA.  The patient would like to alternate his follow-up for prostate cancer with urology and radiation oncology.       2.  Continue follow-up with Dr. Alejandro, urology and Henri, PCP as planned.     Face to face time 35 minutes with > 75% spent on consultation, education and coordination of care.      Lorenza Merino MD  Department of Radiation Oncology   [facility]  Tel: [phone]  Page: [phone]    [facility]  [address] [place] [address]     [facility]   [address] [address]    CC:  Patient Care Team:  Denton Head MD as PCP - General (Internal Medicine)  Lorenza Merino MD as Assigned Cancer Care Provider  Denton Head MD as Assigned PCP  Itz Alejandro MD as MD (Urology)  Bryan Mcgrath DO as Assigned Neuroscience Provider      Oncology Rooming Note    May 22, 2024 9:10 AM   Jorge Dupree is a 83 year old male who presents for:    Chief Complaint   Patient presents with     Oncology Clinic Visit     Follow up with Dr. Merino     Initial Vitals: /64 (BP Location: Right arm, Patient Position: Sitting, Cuff Size: Adult Regular)   Pulse 73   Temp 98.2  F (36.8  C) (Oral)   Resp 16   Wt 76.9 kg (169 lb 9.6 oz)   SpO2 98%   BMI 24.16 kg/m   Estimated body mass index is 24.16 kg/m  as calculated from the following:    Height as of 12/1/23: 1.784 m (5' 10.25\").    Weight as of this encounter: 76.9 kg " (169 lb 9.6 oz). Body surface area is 1.95 meters squared.  No Pain (0) Comment: Data Unavailable   No LMP for male patient.  Allergies reviewed: Yes  Medications reviewed: Yes    Medications: Medication refills not needed today.  Pharmacy name entered into [facility]: [id number] [place] [address]    Frailty Screening:   Is the patient here for a new oncology consult visit in cancer care? 2. No      Clinical concerns: Patient here ambulatory accompanied by wife for follow-up status post radiation for his prostate cancer.  Patient states he is having frequency especially at night.  And this is caused to a lack of sleep.  Patient also states he has lost some weight unintentionally.  PSA and a UA completed.  Seen by Dr. Merino.  Plan return to clinic for follow-up as directed by provider.   Dr. Merino was notified.      Cami Llanes RN                Again, thank you for allowing me to participate in the care of your patient.        Sincerely,        Lorenza Merino MD

## 2024-05-22 NOTE — PROGRESS NOTES
"Oncology Rooming Note    May 22, 2024 9:10 AM   Jorge Dupree is a 83 year old male who presents for:    Chief Complaint   Patient presents with    Oncology Clinic Visit     Follow up with Dr. Merino     Initial Vitals: /64 (BP Location: Right arm, Patient Position: Sitting, Cuff Size: Adult Regular)   Pulse 73   Temp 98.2  F (36.8  C) (Oral)   Resp 16   Wt 76.9 kg (169 lb 9.6 oz)   SpO2 98%   BMI 24.16 kg/m   Estimated body mass index is 24.16 kg/m  as calculated from the following:    Height as of 12/1/23: 1.784 m (5' 10.25\").    Weight as of this encounter: 76.9 kg (169 lb 9.6 oz). Body surface area is 1.95 meters squared.  No Pain (0) Comment: Data Unavailable   No LMP for male patient.  Allergies reviewed: Yes  Medications reviewed: Yes    Medications: Medication refills not needed today.  Pharmacy name entered into [employer]: [id number] [place] [address]    Frailty Screening:   Is the patient here for a new oncology consult visit in cancer care? 2. No      Clinical concerns: Patient here ambulatory accompanied by wife for follow-up status post radiation for his prostate cancer.  Patient states he is having frequency especially at night.  And this is caused to a lack of sleep.  Patient also states he has lost some weight unintentionally.  PSA and AUA completed.  Seen by Dr. Merino.  Plan return to clinic for follow-up as directed by provider.   Dr. Merino was notified.      Cami Llanes RN              "

## 2024-06-18 ENCOUNTER — PATIENT OUTREACH (OUTPATIENT)
Dept: INTERNAL MEDICINE | Facility: CLINIC | Age: 84
End: 2024-06-18
Payer: MEDICARE

## 2024-06-18 NOTE — TELEPHONE ENCOUNTER
Patient Quality Outreach    Patient is due for the following:   Physical Annual Wellness Visit    Next Steps:   Schedule a Annual Wellness Visit    Type of outreach:    Sent [employer] message.      Questions for provider review:    None           Keren Jeffries MA

## 2024-06-18 NOTE — LETTER
June 18, 2024      Jorge ELVIRA Joon  [address] [place] [address]      Your healthcare team cares about your health. To provide you with the best care, we have reviewed your chart and based on our findings, we see that you are due to:     PREVENTATIVE VISIT: Annual Medicare Wellness:Schedule an Annual Medicare Wellness Exam. Please call your [facility] to set up your appointment.    If you have already completed these items, please contact the clinic via phone or Mychart so your care team can review and update your records.  Thank you for choosing [facility] for your healthcare needs. For any questions, concerns, or to schedule an appointment please contact the clinic.     Healthy Regards,    Your [facility] Care Team

## 2024-07-22 ENCOUNTER — NURSE TRIAGE (OUTPATIENT)
Dept: INTERNAL MEDICINE | Facility: CLINIC | Age: 84
End: 2024-07-22

## 2024-07-22 ENCOUNTER — VIRTUAL VISIT (OUTPATIENT)
Dept: INTERNAL MEDICINE | Facility: CLINIC | Age: 84
End: 2024-07-22
Payer: MEDICARE

## 2024-07-22 DIAGNOSIS — U07.1 POSITIVE SELF-ADMINISTERED ANTIGEN TEST FOR COVID-19: Primary | ICD-10-CM

## 2024-07-22 DIAGNOSIS — U07.1 COVID-19 VIRUS INFECTION: ICD-10-CM

## 2024-07-22 DIAGNOSIS — R68.89 FLU-LIKE SYMPTOMS: ICD-10-CM

## 2024-07-22 PROCEDURE — 99441 PR PHYSICIAN TELEPHONE EVALUATION 5-10 MIN: CPT | Mod: 93 | Performed by: NURSE PRACTITIONER

## 2024-07-22 NOTE — TELEPHONE ENCOUNTER
COVID Positive/Requesting COVID treatment    Patient is positive for COVID and requesting treatment options.    Date of positive COVID test (PCR or at home)? 7.21.24  Current COVID symptoms: fever or chills, cough, fatigue, headache, sore throat, congestion or runny nose, and nausea or vomiting  Date COVID symptoms began: 7.21.24    Message should be routed to clinic RN pool. Best phone number to use for call back: [phone]

## 2024-07-22 NOTE — TELEPHONE ENCOUNTER
RN COVID TREATMENT VISIT  07/22/24      The patient has been triaged and does not require a higher level of care.    Patient is Covid positive and requests Paxlovid treatment but requires a virtual visit with provider due to taking Amlodipine. No appointments available today. Please advise.     Jorge Dupree  83 year old  Current weight? 175 lbs    Has the patient been seen by a primary care provider at an [facility] or [facility] within the past two years? Yes.   Have you been in close proximity to/do you have a known exposure to a person with a confirmed case of influenza? No.     General treatment eligibility:  Date of positive COVID test (PCR or at home)?  7/21/24    Are you or have you been hospitalized for this COVID-19 infection? No.   Have you received monoclonal antibodies or antiviral treatment for COVID-19 since this positive test? No.   Do you have any of the following conditions that place you at risk of being very sick from COVID-19?   - Age 50 years or older  Yes, patient has at least one high risk condition as noted above.     Current COVID symptoms:   - fever or chills  - cough  - fatigue  - headache  - sore throat  - congestion or runny nose  - nausea or vomiting  Yes. Patient has at least one symptom as selected.     How many days since symptoms started? 5 days or less. Established patient, 12 years or older weighing at least 88.2 lbs, who has symptoms that started in the past 5 days, has not been hospitalized nor received treatment already, and is at risk for being very sick from COVID-19.     Treatment eligibility by RN:  Are you currently pregnant or nursing? No  Do you have a clinically significant hypersensitivity to nirmatrelvir or ritonavir, or toxic epidermal necrolysis (TEN) or Celaya-Henri Syndrome? No  Do you have a history of hepatitis, any hepatic impairment on the Problem List (such as Child-Gibson Class C, cirrhosis, fatty liver disease, alcoholic liver  disease), or was the last liver lab (hepatic panel, ALT, AST, ALK Phos, bilirubin) elevated in the past 6 months? No  Do you have any history of severe renal impairment (eGFR < 30mL/min)? No    Is patient eligible to continue? Yes, patient meets all eligibility requirements for the RN COVID treatment (as denoted by all no responses above).     Current Outpatient Medications   Medication Sig Dispense Refill    acetaminophen (TYLENOL) 500 MG tablet Take 500-1,000 mg by mouth every 6 hours as needed for mild pain      amLODIPine (NORVASC) 5 MG tablet TAKE 1 TABLET BY MOUTH EVERY DAY 90 tablet 1    docusate sodium (COLACE) 100 MG capsule Take 100 mg by mouth 2 times daily as needed for constipation      ondansetron (ZOFRAN ODT) 4 MG ODT tab Take 1 tablet (4 mg) by mouth every 8 hours as needed for nausea 12 tablet 0    oxybutynin (DITROPAN) 5 MG tablet Take 1 tablet (5 mg) by mouth 2 times daily 180 tablet 3    polyethylene glycol (MIRALAX) 17 GM/Dose powder Take 1 packet every day by oral route.      tamsulosin (FLOMAX) 0.4 MG capsule Take 0.4 mg by mouth daily (Patient not taking: Reported on 5/22/2024)      triamcinolone (KENALOG) 0.1 % external cream triamcinolone acetonide 0.1 % topical cream   APPLY TO BOTH ANKLES TWICE A DAY AS NEEDED WHEN FLARING.         Medications from List 1 of the standing order (on medications that exclude the use of Paxlovid) that patient is taking: NONE. Is patient taking Alex's Wort? No  Is patient taking Alex's Wort or any meds from List 1? No.   Medications from List 2 of the standing order (on meds that provider needs to adjust) that patient is taking: amlodipine (Norvasc), explained a provider visit is necessary to discuss medication adjustments while taking Paxlovid. Is patient on any of the meds from List 2? Yes. No appointments available.          Patient calls with fever/ chills, cough, fatigue, headache, sore throat, congestion, runny nose, and nausea and vomiting    Yesterday tested positive for covid. 7/21/24. See below for further assessment.     Nurse Triage Protocol Disposition is Home Care. Patient is requesting Paxlovid.     1. COVID-19 DIAGNOSIS: positive home test yesterday 7/21    2. COVID-19 EXPOSURE: no    3. ONSET: 7/21/24        4. WORST SYMPTOM: headache and fever     5. COUGH: yes minor    6. FEVER: hasn't measured just having chills    7. RESPIRATORY STATUS: normal    8. BETTER-SAME-WORSE: same    9. OTHER SYMPTOMS: fever or chills, cough, fatigue, headache, sore throat, congestion, runny nose, and nausea and vomiting     10. HIGH RISK DISEASE:  hypertension    11. VACCINE: yes    12. O2 SATURATION MONITOR:  no    Reason for Disposition   COVID-19 diagnosed by positive lab test (e.g., PCR, rapid self-test kit) and mild symptoms (e.g., cough, fever, others) and no complications or SOB    Additional Information   Negative: SEVERE difficulty breathing (e.g., struggling for each breath, speaks in single words)   Negative: Difficult to awaken or acting confused (e.g., disoriented, slurred speech)   Negative: Bluish (or gray) lips or face now   Negative: Shock suspected (e.g., cold/pale/clammy skin, too weak to stand, low BP, rapid pulse)   Negative: Sounds like a life-threatening emergency to the triager   Negative: Diagnosed or suspected COVID-19 and symptoms lasting 3 or more weeks   Negative: COVID-19 exposure and no symptoms   Negative: COVID-19 vaccine reaction suspected (e.g., fever, headache, muscle aches) occurring 1 to 3 days after getting vaccine   Negative: COVID-19 vaccine, questions about   Negative: Lives with someone known to have influenza (flu test positive) and flu-like symptoms (e.g., cough, runny nose, sore throat, SOB; with or without fever)   Negative: Possible COVID-19 symptoms and triager concerned about severity of symptoms or other causes   Negative: COVID-19 and breastfeeding, questions about   Negative: SEVERE or constant chest pain or  pressure  (Exception: Mild central chest pain, present only when coughing.)   Negative: MODERATE difficulty breathing (e.g., speaks in phrases, SOB even at rest, pulse 100-120)   Negative: Headache and stiff neck (can't touch chin to chest)   Negative: Oxygen level (e.g., pulse oximetry) 90% or lower   Negative: Chest pain or pressure  (Exception: MILD central chest pain, present only when coughing.)   Negative: Drinking very little and dehydration suspected (e.g., no urine > 12 hours, very dry mouth, very lightheaded)   Negative: Patient sounds very sick or weak to the triager   Negative: MILD difficulty breathing (e.g., minimal/no SOB at rest, SOB with walking, pulse <100)   Negative: Fever > 103 F (39.4 C)   Negative: Fever > 101 F (38.3 C) and over 60 years of age   Negative: Fever > 100.0 F (37.8 C) and bedridden (e.g., CVA, chronic illness, recovering from surgery)   Negative: HIGH RISK patient (e.g., weak immune system, age > 64 years, obesity with BMI of 30 or higher, pregnant, chronic lung disease or other chronic medical condition) and COVID symptoms (e.g., cough, fever)  (Exceptions: Already seen by doctor or NP/PA and no new or worsening symptoms.)   Negative: HIGH RISK patient and influenza is widespread in the community and ONE OR MORE respiratory symptoms: cough, sore throat, runny or stuffy nose   Negative: HIGH RISK patient and influenza exposure within the last 7 days and ONE OR MORE respiratory symptoms: cough, sore throat, runny or stuffy nose   Negative: Oxygen level (e.g., pulse oximetry) 91 to 94%   Negative: COVID-19 infection suspected by caller or triager and mild symptoms (cough, fever, or others) and negative COVID-19 rapid test   Negative: Fever present > 3 days (72 hours)   Negative: Fever returns after gone for over 24 hours and symptoms worse or not improved   Negative: Continuous (nonstop) coughing interferes with work or school and no improvement using cough treatment per Care  Advice   Negative: Cough present > 3 weeks   Negative: COVID-19 diagnosed by positive lab test (e.g., PCR, rapid self-test kit) and NO symptoms (e.g., cough, fever, others)    Protocols used: Coronavirus (COVID-19) Diagnosed or [id number]

## 2024-07-22 NOTE — TELEPHONE ENCOUNTER
Pamella Morning, NP was talking with patient's wife. She will do a telephone visit with him today.     Closing encounter.     Latosha URBINA RN

## 2024-07-22 NOTE — PROGRESS NOTES
Jorge is a 83 year old who is being evaluated via a billable telephone visit.    What phone number would you like to be contacted at? [phone]  How would you like to obtain your AVS? Pedro  Originating Location (pt. Location): Home    Distant Location (provider location):  On-site    Assessment & Plan     Positive self-administered antigen test for COVID-19  Positive for COVID-19 on a home test.  At this time I recommend patient come in for confirmation testing on a PCR test to ensure he is positive for COVID-19 and also have his liver and kidney function assessed prior to starting Paxlovid.  If PCR COVID-19 testing is positive and kidney/renal function is normal patient can be treated with Paxlovid.  Reviewed risks and benefits of this medication including CDC advisory for possible rebound COVID.  Reviewed additional symptomatic management.  Reviewed red flag symptoms that if develop would warrant immediate evaluation in the ER.  - Comprehensive metabolic panel; Future  - Symptomatic Influenza A/B, RSV, & SARS-CoV2 PCR (COVID-19); Future    Flu-like symptoms    - Comprehensive metabolic panel; Future  - Symptomatic Influenza A/B, RSV, & SARS-CoV2 PCR (COVID-19); Future                Subjective   Jorge is a 83 year old, presenting for the following health issues:  covid positive (Home covid test positive Saturday- symptoms started Saturday- raspy sore throat, feverish, upset stomach)      7/22/2024     3:34 PM   Additional Questions   Roomed by Tiffany SILVA   Jorge is a very pleasant 83-year-old male who is being seen via telephone visit after testing positive for COVID-19 on a home test Saturday evening.  Started having sore throat, upset stomach and an episode of vomiting.  He felt feverish at times.  No diarrhea, chest pain or shortness of breath.  Has not checked temperature at home.  Took an at home COVID-19 test and it was positive.  Wife has since also tested positive for COVID-19 on home test.  Is  interested in treatment with Paxlovid for COVID-19.    COVID-19 Symptom Review  How many days ago did these symptoms start? Saturday    Are any of the following symptoms significant for you?  New or worsening difficulty breathing? No  Worsening cough? Yes, it's a dry cough.   Fever or chills? Yes, I felt feverish or had chills.  Headache: YES  Sore throat: YES  Chest pain: No  Diarrhea: No  Body aches? No    What treatments has patient tried? Acetaminophen   Does patient live in a nursing home, group home, or shelter? No  Does patient have a way to get food/medications during quarantined? NO          ROS  Comprehensive 12-point review of systems was completed and negative except as noted in HPI.        Objective         Vitals:  No vitals were obtained today due to virtual visit.    Physical Exam   General: Alert and no distress //Respiratory: No audible wheeze, cough, or shortness of breath // Psychiatric:  Appropriate affect, tone, and pace of words        Phone call duration: 9 minutes  Signed Electronically by: Pamella Welsh NP

## 2024-07-23 ENCOUNTER — TELEPHONE (OUTPATIENT)
Dept: NURSING | Facility: CLINIC | Age: 84
End: 2024-07-23

## 2024-07-23 ENCOUNTER — LAB (OUTPATIENT)
Dept: LAB | Facility: CLINIC | Age: 84
End: 2024-07-23
Payer: MEDICARE

## 2024-07-23 ENCOUNTER — TELEPHONE (OUTPATIENT)
Dept: INTERNAL MEDICINE | Facility: CLINIC | Age: 84
End: 2024-07-23

## 2024-07-23 DIAGNOSIS — R68.89 FLU-LIKE SYMPTOMS: ICD-10-CM

## 2024-07-23 DIAGNOSIS — U07.1 POSITIVE SELF-ADMINISTERED ANTIGEN TEST FOR COVID-19: ICD-10-CM

## 2024-07-23 LAB
ALBUMIN SERPL BCG-MCNC: 4 G/DL (ref 3.5–5.2)
ALP SERPL-CCNC: 120 U/L (ref 40–150)
ALT SERPL W P-5'-P-CCNC: 12 U/L (ref 0–70)
ANION GAP SERPL CALCULATED.3IONS-SCNC: 11 MMOL/L (ref 7–15)
AST SERPL W P-5'-P-CCNC: 22 U/L (ref 0–45)
BILIRUB SERPL-MCNC: 0.3 MG/DL
BUN SERPL-MCNC: 12.7 MG/DL (ref 8–23)
CALCIUM SERPL-MCNC: 8.7 MG/DL (ref 8.8–10.4)
CHLORIDE SERPL-SCNC: 103 MMOL/L (ref 98–107)
CREAT SERPL-MCNC: 0.83 MG/DL (ref 0.67–1.17)
EGFRCR SERPLBLD CKD-EPI 2021: 87 ML/MIN/1.73M2
FLUAV RNA SPEC QL NAA+PROBE: NEGATIVE
FLUBV RNA RESP QL NAA+PROBE: NEGATIVE
GLUCOSE SERPL-MCNC: 108 MG/DL (ref 70–99)
HCO3 SERPL-SCNC: 25 MMOL/L (ref 22–29)
POTASSIUM SERPL-SCNC: 3.9 MMOL/L (ref 3.4–5.3)
PROT SERPL-MCNC: 6.6 G/DL (ref 6.4–8.3)
RSV RNA SPEC NAA+PROBE: NEGATIVE
SARS-COV-2 RNA RESP QL NAA+PROBE: POSITIVE
SODIUM SERPL-SCNC: 139 MMOL/L (ref 135–145)

## 2024-07-23 PROCEDURE — 36415 COLL VENOUS BLD VENIPUNCTURE: CPT

## 2024-07-23 PROCEDURE — 80053 COMPREHEN METABOLIC PANEL: CPT

## 2024-07-23 PROCEDURE — 87637 SARSCOV2&INF A&B&RSV AMP PRB: CPT

## 2024-07-23 NOTE — TELEPHONE ENCOUNTER
Called back to patient, relayed provider's lab notes as CMP still pending at this time:        Patient verbalized understanding and is okay to wait tomorrow with updates.

## 2024-07-23 NOTE — TELEPHONE ENCOUNTER
Coronavirus (COVID-19) Notification    Caller Name (Patient, parent, daughter/son, grandparent, etc)  Spouse    Reason for call  Notify of Positive Coronavirus (COVID-19) lab results, assess symptoms,  review [facility] recommendations    Lab Result    Lab test:  2019-nCoV rRt-PCR or SARS-CoV-2 PCR    Oropharyngeal AND/OR nasopharyngeal swabs is POSITIVE for 2019-nCoV RNA/SARS-COV-2 PCR (COVID-19 virus)      Gather patient reported symptoms   Assessment   Current Symptoms at time of phone call, reported by patient: (if no symptoms, document: No symptoms] unknown   Date of symptom(s) onset (if applicable)      If at time of call, Patients symptoms have worsened, the Patient should contact 911 or have someone drive them to Emergency Dept promptly:    If Patient calling 911, inform 911 personal that you have tested positive for the Coronavirus (COVID-19).  Place mask on and await 911 to arrive.  If Emergency Dept, If possible, please have another adult drive you to the Emergency Dept but you need to wear mask when in contact with other people.      Treatment Options:   Is patient interested in discussing COVID treatment? No.        Review information with Patient    Your result was positive. This means you have COVID-19 (coronavirus).    How can I protect others?    These guidelines are for isolating before returning to work, school or .    If you DO have symptoms  Stay home and away from others   For at least 5 days after your symptoms started, AND  You are fever free for 24 hours (with no medicine that reduces fever), AND  Your other symptoms are better  Wear a mask for 10 full days anytime you are around others    If you DON'T have symptoms  Stay home and away from others for at least 5 days after your positive test  Wear a mask for 10 full days anytime you are around others    There may be different guidelines for healthcare facilities.  Please check with the specific sites before arriving.    If you  have been told by a doctor that you were severely ill with COVID-19 or are immunocompromised, you should isolate for at least 10 days.    You should not go back to work until you meet the guidelines above for ending your home isolation. You don't need to be retested for COVID-19 before going back to work--studies show that you won't spread the virus if it's been at least 10 days since your symptoms started (or 20 days, if you have a weak immune system).    Employers, schools, and daycares: This is an official notice for this person's medical guidelines for returning in-person.  They must meet the above guidelines before going back to work, school or  in person.    You will receive a positive COVID-19 letter via [employer] or the mail soon with additional self-care information.    Would you like me to review some of that information with you now?  No    If you were tested for an upcoming procedure, please contact your provider for next steps.    Karrie Balderas

## 2024-07-23 NOTE — TELEPHONE ENCOUNTER
S-(situation):     Patient states they were called by (us) to be notified of positive COVID results, and then were transferred to Clinic RN.    B-(background):   Virtual Visit: 7/22/2024 with Morning, Pamella TRAN NP (Nurse Practitioner)  Will have patient come in for confirmation PCR testing and Cmp to ensure she qualifies for use of Paxlovid for treatment of COVID-19 infection.  If positive on a PCR test for COVID-19 and liver and renal function are stable we will treat with Paxlovid for 5 days.     A-(assessment):   Pending Labs: CMP      R-(recommendations):   Patient and Spouse would like to start treatment ASAP.  Please review labs and contact patient when labs are available to update if able to prescribe Paxlovid.    ROSEMARY Haji, BSN  [place], [place]

## 2024-07-24 NOTE — TELEPHONE ENCOUNTER
Outgoing call to patient. Relayed PCP's detailed message. No further questions/concerns at this time.

## 2024-07-31 ENCOUNTER — MEDICAL CORRESPONDENCE (OUTPATIENT)
Dept: HEALTH INFORMATION MANAGEMENT | Facility: CLINIC | Age: 84
End: 2024-07-31
Payer: MEDICARE

## 2024-07-31 ENCOUNTER — TRANSFERRED RECORDS (OUTPATIENT)
Dept: HEALTH INFORMATION MANAGEMENT | Facility: CLINIC | Age: 84
End: 2024-07-31
Payer: MEDICARE

## 2024-08-14 ENCOUNTER — PATIENT OUTREACH (OUTPATIENT)
Dept: CARE COORDINATION | Facility: CLINIC | Age: 84
End: 2024-08-14
Payer: MEDICARE

## 2024-10-16 ENCOUNTER — TRANSFERRED RECORDS (OUTPATIENT)
Dept: HEALTH INFORMATION MANAGEMENT | Facility: CLINIC | Age: 84
End: 2024-10-16
Payer: MEDICARE

## 2024-11-03 DIAGNOSIS — I10 ESSENTIAL HYPERTENSION, BENIGN: ICD-10-CM

## 2024-11-04 RX ORDER — AMLODIPINE BESYLATE 5 MG/1
TABLET ORAL
Qty: 90 TABLET | Refills: 1 | Status: SHIPPED | OUTPATIENT
Start: 2024-11-04

## 2025-04-29 DIAGNOSIS — I10 ESSENTIAL HYPERTENSION, BENIGN: ICD-10-CM

## 2025-04-29 RX ORDER — AMLODIPINE BESYLATE 5 MG/1
5 TABLET ORAL
Qty: 90 TABLET | Refills: 1 | Status: SHIPPED | OUTPATIENT
Start: 2025-04-29

## 2025-07-29 ENCOUNTER — OFFICE VISIT (OUTPATIENT)
Dept: INTERNAL MEDICINE | Facility: CLINIC | Age: 85
End: 2025-07-29
Payer: MEDICARE

## 2025-07-29 VITALS
OXYGEN SATURATION: 98 % | HEART RATE: 77 BPM | RESPIRATION RATE: 16 BRPM | DIASTOLIC BLOOD PRESSURE: 62 MMHG | HEIGHT: 70 IN | SYSTOLIC BLOOD PRESSURE: 108 MMHG | BODY MASS INDEX: 22.9 KG/M2 | TEMPERATURE: 98.2 F | WEIGHT: 160 LBS

## 2025-07-29 DIAGNOSIS — R73.03 PREDIABETES: ICD-10-CM

## 2025-07-29 DIAGNOSIS — Z85.46 HISTORY OF PROSTATE CANCER: ICD-10-CM

## 2025-07-29 DIAGNOSIS — I87.2 VENOUS STASIS DERMATITIS: Primary | ICD-10-CM

## 2025-07-29 DIAGNOSIS — N32.81 URGENCY-FREQUENCY SYNDROME: ICD-10-CM

## 2025-07-29 LAB
EST. AVERAGE GLUCOSE BLD GHB EST-MCNC: 120 MG/DL
HBA1C MFR BLD: 5.8 % (ref 0–5.6)

## 2025-07-29 PROCEDURE — 99213 OFFICE O/P EST LOW 20 MIN: CPT | Performed by: INTERNAL MEDICINE

## 2025-07-29 PROCEDURE — 36415 COLL VENOUS BLD VENIPUNCTURE: CPT | Performed by: INTERNAL MEDICINE

## 2025-07-29 PROCEDURE — 83036 HEMOGLOBIN GLYCOSYLATED A1C: CPT | Performed by: INTERNAL MEDICINE

## 2025-07-29 PROCEDURE — 3074F SYST BP LT 130 MM HG: CPT | Performed by: INTERNAL MEDICINE

## 2025-07-29 PROCEDURE — G2211 COMPLEX E/M VISIT ADD ON: HCPCS | Performed by: INTERNAL MEDICINE

## 2025-07-29 PROCEDURE — 3078F DIAST BP <80 MM HG: CPT | Performed by: INTERNAL MEDICINE

## 2025-07-29 PROCEDURE — 3044F HG A1C LEVEL LT 7.0%: CPT | Performed by: INTERNAL MEDICINE

## 2025-07-29 RX ORDER — TRIAMCINOLONE ACETONIDE 1 MG/G
CREAM TOPICAL 2 TIMES DAILY
Qty: 80 G | Refills: 3 | Status: SHIPPED | OUTPATIENT
Start: 2025-07-29

## 2025-07-29 RX ORDER — SOLIFENACIN SUCCINATE 10 MG/1
10 TABLET, FILM COATED ORAL DAILY
Qty: 90 TABLET | Refills: 3 | Status: SHIPPED | OUTPATIENT
Start: 2025-07-29

## 2025-07-29 NOTE — PROGRESS NOTES
Office Visit - Follow Up   Jorge Dupree   84 year old male    Date of Visit: 7/29/2025    Chief Complaint   Patient presents with    Hypertension     fasting        -------------------------------------------------------------------------------------------------------------------------  Assessment and Plan    Midyear check-in, overall Jorge is doing well.  Will have him stop by the laboratory this morning to check hemoglobin A1c.    I renewed his prescription for solifenacin (Vesicare) moving up to the 10 mg tablet size.  Also renewed his prescription for topical triamcinolone to treat chronic venous stasis dermatitis both lower legs    I reminded Jorge to try to implement a program of scheduled voiding during the day when he is awake, emptying his bladder approximately every hour, in order to avoid urgency accidents.    Prediabetes  I told Jorge that he does not need medication for prediabetes.  But rather he should focus on healthy eating, generally avoiding sugars and starches.    Also build lean muscle mass which is good for his body overall, including mobility and reducing risk of falling, but also help metabolically in terms of improving insulin sensitivity and healthy glucose metabolism    1-  Hemoglobin A1C  0.0 - 5.6 % 5.9 High       I reminded Jorge to continue the home exercises  Physical therapy which Jorge completed mid November 2023 to address gluteal pain and lumbar stenosis.  I reminded Jorge of the importance of doing the daily exercises prescribed by the physical therapist.  Chronic pain in the buttocks, hips, and both thighs, symptoms seem quite suspicious for neuro claudication of lumbar spinal stenosis, and furthermore he walks with a slightly forward flexed at the hip posture.  I reminded Jorge that it is okay to use acetaminophen (Tylenol) for pain relief.  Maximum is 3000 mg in 24 hours.     For Jorge's  knees, he might also use Voltaren gel (topical diclofenac), 2 or 3 times a day as  needed.     Degenerative arthritis in lumbar spine, hips, knees  July 29, 2025, I reminded glad to keep doing the home exercises for the buttocks, hips, lower back, that he was taught by the physical therapist    EXAM: XR LUMBAR SPINE 2/3 VIEWS  LOCATION: [facility]  DATE: 8/17/2023  INDICATION:  Spinal stenosis of lumbar region with neurogenic claudication  COMPARISON: 02/03/2021                                  IMPRESSION: No fracture. Normal vertebral heights and alignment. Moderate multilevel disc height loss. Scattered mild multilevel osteophyte formation. Moderate facet hypertrophy at L4-L5 and L5-S1. Aortic atherosclerosis.     EXAM: XR PELVIS AND HIP BILATERAL 2 VIEWS  LOCATION: [facility]  DATE: 8/17/2023  INDICATION: Spinal stenosis of lumbar region with neurogenic claudication.  COMPARISON: None.                                       IMPRESSION: Mild to moderate arthrosis bilateral hips. Bones are demineralized. No acute fracture or dislocation.     EXAM: XR KNEE BILATERAL 3 VIEWS  LOCATION: [facility]  DATE: 10/6/2023  INDICATION:  Decreased range of motion of both hips  COMPARISON: None.                          IMPRESSION:   RIGHT KNEE: No acute fracture or malalignment. Mild medial and patellofemoral compartment degenerative changes. Minimal knee joint effusion. Superior patellar enthesophyte. Osteopenia.  LEFT KNEE: No acute fracture or malalignment. Mild patellofemoral compartment degenerative changes. No knee joint effusion. Superior patellar enthesophyte. Osteopenia.     Intermittent edema right leg and ankle, which I believe is on the basis of venous insufficiency.  He has hyperpigmentation of the medial right ankle and also a lot of spider varicosities.  I told when he probably has leaky veins in his right lower extremity, which produces a pressure column and subsequent swelling.  I suggested that Jorge corrales  over-the-counter knee-high compression stockings.  Varicose veins lower right leg, with a patch of venous stasis dermatitis medial right ankle, for which she has seen a dermatologist for this and prescribed some steroid cream  Stasis dermatitis  triamcinolone (KENALOG) 0.1 % external cream      Essential hypertension, on amlodipine 5 mg  Target blood pressure is less than 130 systolic, and around 80 diastolic.     I reminded Jorge to control the amount of sodium in his diet, try to keep that below 3000 g/day.  He does maintain healthy weight, and stays physically active.     BP Readings from Last 6 Encounters:   07/29/25 108/62   01/31/25 (!) 152/74   05/22/24 130/64   01/29/24 136/67   01/10/24 128/71   12/01/23 128/62     1-  LDL Cholesterol Calculated  <100 mg/dL 88     Direct Measure HDL  >=40 mg/dL 77     Triglycerides  <150 mg/dL 31     Bladder urgency and frequency, leakage, in the context of history of prostate cancer and pelvic radiation therapy that completed in April 2019,      Switched to solifenacin (VESICARE)   Previous Immediate release oxybutynin  No longer takes tamsulosin     Solifenicen (vesicare)--Jorge was taking 5 mg, then upon suggestion of your his urology started taking 2 of the 5 mg tablets to equal 10 mg, and thinks that that performs a little bit better.  July 29, 2025 am going to switch  Jorge to the larger 10 mg tablet    I recommended to Jorge that he consider implementing a program of scheduled voiding, which means emptying the bladder approximately every hour while awake on a timed schedule, rather than waiting for the urge to go  Samples of Gentesa-- nausea    Tamsulosin stopped 6/9/2021 after syncopal spell, then restarted  But he told me he cannot perceive any symptomatic benefit     Prostate Specific Antigen Screen  ng/mL 0.10     DISEASE TREATED: Unfavorable intermediate risk prostate cancer, clinical stage T2 cN0 M0, [place] grade 4+3=7 and 3+4 =7 involving up to 90% of  biopsy tissue bilaterally, and pretherapy PSA of 10.2.  TYPE OF RADIATION THERAPY ADMINISTERED:  7942 cGy in 45 treatments given from 2/19/2019-5/2/2019     Mild normocytic anemia, resolved  1-  Hemoglobin  13.3 - 17.7 g/dL 13.4     No recurrence of syncopal spell 6/7/2021   Probably this fainting spell was because of low blood pressure. I advised him at that time to stop tamsulosin which he did     Weight stablized  His PSA has remained close to 0, so I do not think we need to worry about any recurrence of prostate cancer.     Wt Readings from Last 5 Encounters:   07/29/25 72.6 kg (160 lb)   01/31/25 73.9 kg (163 lb)   05/22/24 76.9 kg (169 lb 9.6 oz)   01/10/24 75.2 kg (165 lb 12.8 oz)   12/01/23 75.8 kg (167 lb)     External hemorrhoids, comes and goes, with history of small adenomatous colon polyps removed January 2015; possibly hemorrhoids relate to vascular congestion as a consequence of pelvic radiation for prostate cancer  He told me the bleeding flares up from time to time.  He is using Colace stool softener and also the osmotic laxative MiraLAX.  I reminded him to adjust the dose of MiraLAX and/or Metamucil to achieve soft stools, but not trigger diarhea      It is possible that radiation therapy exacerbated tendency for hemorrhoids, possibly by forming scar tissue that could contribute to venous congestion     February 2020, he had approximately 3 to 4 mm external hemorrhoid, with some clot inside.  I did not detect significant internal hemorrhoids.  He did have a colonoscopy performed January 2015 which disclosed 2 small adenomatous polyps that were removed.  These were less than 4 mm. Also noted was long redundant colon. Diverticulosis not noted.     Earwax  I recommended that he purchase over-the-counter wax dissolving eardrop, example brand Debrox or Murine, follow the package directions  Recommend that he do that perhaps once a week to keep the ears clear     Synovial cyst left shoulder, which  protrudes anteriorly, and fluctuates in size.  I told that the cyst may wax and wane based on the amount of fluid inside the joint which can get resorbed, and fluid might increase if the joint is irritated for some reason.  This is benign, he still has a good range of motion, and nothing needs to be done     Actinic and seborrheic keratoses, working with his dermatologist     Jorge had COVID-19 infection in January and July 2024, both of these were mild illnesses, and he took Paxlovid for both    Immunization History   Administered Date(s) Administered    COVID-19 12+ (Pfizer) 11/16/2023, 09/19/2024    COVID-19 Bivalent 12+ (Pfizer) 11/01/2022    COVID-19 MONOVALENT 12+ (Pfizer) 02/16/2021, 03/09/2021, 10/11/2021    COVID-19 Monovalent 12+ (Pfizer 2022) 05/16/2022    Flu 65+ (Fluad) 09/13/2022, 10/17/2024    Influenza (H1N1) 03/15/2010    Influenza (High Dose) Trivalent,PF (Fluzone) 10/07/2017, 08/27/2018, 09/17/2019, 09/06/2021, 09/13/2022    Influenza (IIV3) PF 10/01/2004, 10/01/2006, 10/01/2007, 08/15/2009, 08/31/2009, 10/23/2010, 10/15/2011, 08/25/2012, 10/01/2013, 08/22/2014    Influenza Vaccine 65+ (FLUAD) 10/16/2023    Influenza Vaccine 65+ (Fluzone HD) 09/13/2022    Influenza Vaccine, 6+MO IM (QUADRIVALENT W/PRESERVATIVES) 08/28/2015    Pneumo Conj 13-V (2010&after) 05/14/2015    Pneumococcal (PCV 7) 07/30/2010    Pneumococcal 23 valent 01/21/2016    RSV Vaccine (Arexvy) 09/28/2023    TD,PF 7+ (Tenivac) 02/22/2005, 09/28/2016    Td (Adult), Adsorbed 06/16/1997    Zoster recombinant adjuvanted (Shingrix) 11/18/2024, 03/03/2025    Zoster vaccine, live 08/13/2012       --------------------------------------------------------------------------------------------------------------------------  History of Present Illness  This 84 year old old       Hypertension (fasting)       7/29/2025    10:28 AM   Additional Questions   Roomed by Isha      History of Present Illness         Reason for visit:  Follow up     Raheem  eats 2-3 servings of fruits and vegetables daily.He consumes 2 sweetened beverage(s) daily.He exercises with enough effort to increase his heart rate 10 to 19 minutes per day.  He exercises with enough effort to increase his heart rate 5 days per week.   He is taking medications regularly.      Wt Readings from Last 3 Encounters:   07/29/25 72.6 kg (160 lb)   01/31/25 73.9 kg (163 lb)   05/22/24 76.9 kg (169 lb 9.6 oz)     BP Readings from Last 3 Encounters:   07/29/25 108/62   01/31/25 (!) 152/74   05/22/24 130/64     ---------------------------------------------------------------------------------------------------------------------------    Medications, Allergies, Social, and Problem List       Current Outpatient Medications   Medication Sig Dispense Refill    acetaminophen (TYLENOL) 500 MG tablet Take 500-1,000 mg by mouth every 6 hours as needed for mild pain      amLODIPine (NORVASC) 5 MG tablet TAKE 1 TABLET BY MOUTH EVERY DAY 90 tablet 1    docusate sodium (COLACE) 100 MG capsule Take 100 mg by mouth 2 times daily as needed for constipation      solifenacin (VESICARE) 5 MG tablet Take 1 tablet by mouth daily.      triamcinolone (KENALOG) 0.1 % external cream triamcinolone acetonide 0.1 % topical cream   APPLY TO BOTH ANKLES TWICE A DAY AS NEEDED WHEN FLARING.       Allergies   Allergen Reactions    Alpha Blocker Quinazolines      rash     Social History     Tobacco Use    Smoking status: Never     Passive exposure: Never    Smokeless tobacco: Never   Vaping Use    Vaping status: Never Used   Substance Use Topics    Alcohol use: No     Alcohol/week: 0.0 - 1.0 standard drinks of alcohol     Comment: SELDOM TO SMALL    Drug use: No     Patient Active Problem List   Diagnosis    Essential hypertension, benign    Lumbago    Hearing loss    Hypertrophy of prostate with urinary obstruction    CARDIOVASCULAR SCREENING; LDL GOAL LESS THAN 160    Urgency-frequency syndrome    Elevated prostate specific antigen (PSA)  "   Pain in joint, shoulder region    Biceps tendonitis    Lateral epicondylitis    Decreased range of motion of both hips    Sagittal plane imbalance        Reviewed, reconciled and updated       Physical Exam   General Appearance:       /62 (BP Location: Right arm, Patient Position: Sitting, Cuff Size: Adult Regular)   Pulse 77   Temp 98.2  F (36.8  C)   Resp 16   Ht 1.784 m (5' 10.25\")   Wt 72.6 kg (160 lb)   SpO2 98%   BMI 22.79 kg/m      Appears well     Additional Information   I spent 20 minutes on this encounter, including reviewing interval history since last visit, examining the patient, explaining and counseling the issues enumerated in the Assessment and Plan (patient given a copy), ordering indicated tests    The longitudinal plan of care for the diagnosis(es)/condition(s) as documented were addressed during this visit. Due to the added complexity in care, I will continue to support Jorge in the subsequent management and with ongoing continuity of care.       JUNITO TREVINO MD, MD      Signed Electronically by: JUNITO TREVINO MD    "

## 2025-07-29 NOTE — PATIENT INSTRUCTIONS
Midyear check-in, overall Jorge is doing well.  Will have him stop by the laboratory this morning to check hemoglobin A1c.    I renewed his prescription for solifenacin (Vesicare) moving up to the 10 mg tablet size.  Also renewed his prescription for topical triamcinolone to treat chronic venous stasis dermatitis both lower legs    I reminded Jorge to try to implement a program of scheduled voiding during the day when he is awake, emptying his bladder approximately every hour, in order to avoid urgency accidents.    Prediabetes  I told Jorge that he does not need medication for prediabetes.  But rather he should focus on healthy eating, generally avoiding sugars and starches.    Also build lean muscle mass which is good for his body overall, including mobility and reducing risk of falling, but also help metabolically in terms of improving insulin sensitivity and healthy glucose metabolism    1-  Hemoglobin A1C  0.0 - 5.6 % 5.9 High         I reminded Jorge to continue the home exercises  Physical therapy which Jorge completed mid November 2023 to address gluteal pain and lumbar stenosis.  I reminded Jorge of the importance of doing the daily exercises prescribed by the physical therapist.  Chronic pain in the buttocks, hips, and both thighs, symptoms seem quite suspicious for neuro claudication of lumbar spinal stenosis, and furthermore he walks with a slightly forward flexed at the hip posture.  I reminded Jorge that it is okay to use acetaminophen (Tylenol) for pain relief.  Maximum is 3000 mg in 24 hours.     For Jorge's  knees, he might also use Voltaren gel (topical diclofenac), 2 or 3 times a day as needed.     Degenerative arthritis in lumbar spine, hips, knees  July 29, 2025, I reminded glad to keep doing the home exercises for the buttocks, hips, lower back, that he was taught by the physical therapist    EXAM: XR LUMBAR SPINE 2/3 VIEWS  LOCATION: [facility]  DATE:  8/17/2023  INDICATION:  Spinal stenosis of lumbar region with neurogenic claudication  COMPARISON: 02/03/2021                                  IMPRESSION: No fracture. Normal vertebral heights and alignment. Moderate multilevel disc height loss. Scattered mild multilevel osteophyte formation. Moderate facet hypertrophy at L4-L5 and L5-S1. Aortic atherosclerosis.     EXAM: XR PELVIS AND HIP BILATERAL 2 VIEWS  LOCATION: [facility]  DATE: 8/17/2023  INDICATION: Spinal stenosis of lumbar region with neurogenic claudication.  COMPARISON: None.                                       IMPRESSION: Mild to moderate arthrosis bilateral hips. Bones are demineralized. No acute fracture or dislocation.     EXAM: XR KNEE BILATERAL 3 VIEWS  LOCATION: [facility]  DATE: 10/6/2023  INDICATION:  Decreased range of motion of both hips  COMPARISON: None.                          IMPRESSION:   RIGHT KNEE: No acute fracture or malalignment. Mild medial and patellofemoral compartment degenerative changes. Minimal knee joint effusion. Superior patellar enthesophyte. Osteopenia.  LEFT KNEE: No acute fracture or malalignment. Mild patellofemoral compartment degenerative changes. No knee joint effusion. Superior patellar enthesophyte. Osteopenia.     Intermittent edema right leg and ankle, which I believe is on the basis of venous insufficiency.  He has hyperpigmentation of the medial right ankle and also a lot of spider varicosities.  I told when he probably has leaky veins in his right lower extremity, which produces a pressure column and subsequent swelling.  I suggested that Jorge try over-the-counter knee-high compression stockings.  Varicose veins lower right leg, with a patch of venous stasis dermatitis medial right ankle, for which she has seen a dermatologist for this and prescribed some steroid cream  Stasis dermatitis  triamcinolone (KENALOG) 0.1 % external cream      Essential  hypertension, on amlodipine 5 mg  Target blood pressure is less than 130 systolic, and around 80 diastolic.     I reminded Jorge to control the amount of sodium in his diet, try to keep that below 3000 g/day.  He does maintain healthy weight, and stays physically active.     BP Readings from Last 6 Encounters:   07/29/25 108/62   01/31/25 (!) 152/74   05/22/24 130/64   01/29/24 136/67   01/10/24 128/71   12/01/23 128/62     1-  LDL Cholesterol Calculated  <100 mg/dL 88     Direct Measure HDL  >=40 mg/dL 77     Triglycerides  <150 mg/dL 31     Bladder urgency and frequency, leakage, in the context of history of prostate cancer and pelvic radiation therapy that completed in April 2019,      Switched to solifenacin (VESICARE)   Previous Immediate release oxybutynin  No longer takes tamsulosin     Solifenicen (vesicare)--Jorge was taking 5 mg, then upon suggestion of your his urology started taking 2 of the 5 mg tablets to equal 10 mg, and thinks that that performs a little bit better.  July 29, 2025 am going to switch  Jorge to the larger 10 mg tablet    I recommended to Jorge that he consider implementing a program of scheduled voiding, which means emptying the bladder approximately every hour while awake on a timed schedule, rather than waiting for the urge to go  Samples of Gentesa-- nausea    Tamsulosin stopped 6/9/2021 after syncopal spell, then restarted  But he told me he cannot perceive any symptomatic benefit     Prostate Specific Antigen Screen  ng/mL 0.10     DISEASE TREATED: Unfavorable intermediate risk prostate cancer, clinical stage T2 cN0 M0, [place] grade 4+3=7 and 3+4 =7 involving up to 90% of biopsy tissue bilaterally, and pretherapy PSA of 10.2.  TYPE OF RADIATION THERAPY ADMINISTERED:  7942 cGy in 45 treatments given from 2/19/2019-5/2/2019     Mild normocytic anemia, resolved  1-  Hemoglobin  13.3 - 17.7 g/dL 13.4     No recurrence of syncopal spell 6/7/2021   Probably this fainting  hieu was because of low blood pressure. I advised him at that time to stop tamsulosin which he did     Weight stablized  His PSA has remained close to 0, so I do not think we need to worry about any recurrence of prostate cancer.     Wt Readings from Last 5 Encounters:   07/29/25 72.6 kg (160 lb)   01/31/25 73.9 kg (163 lb)   05/22/24 76.9 kg (169 lb 9.6 oz)   01/10/24 75.2 kg (165 lb 12.8 oz)   12/01/23 75.8 kg (167 lb)     External hemorrhoids, comes and goes, with history of small adenomatous colon polyps removed January 2015; possibly hemorrhoids relate to vascular congestion as a consequence of pelvic radiation for prostate cancer  He told me the bleeding flares up from time to time.  He is using Colace stool softener and also the osmotic laxative MiraLAX.  I reminded him to adjust the dose of MiraLAX and/or Metamucil to achieve soft stools, but not trigger diarhea      It is possible that radiation therapy exacerbated tendency for hemorrhoids, possibly by forming scar tissue that could contribute to venous congestion     February 2020, he had approximately 3 to 4 mm external hemorrhoid, with some clot inside.  I did not detect significant internal hemorrhoids.  He did have a colonoscopy performed January 2015 which disclosed 2 small adenomatous polyps that were removed.  These were less than 4 mm. Also noted was long redundant colon. Diverticulosis not noted.     Earwax  I recommended that he purchase over-the-counter wax dissolving eardrop, example brand Debrox or Murine, follow the package directions  Recommend that he do that perhaps once a week to keep the ears clear     Synovial cyst left shoulder, which protrudes anteriorly, and fluctuates in size.  I told that the cyst may wax and wane based on the amount of fluid inside the joint which can get resorbed, and fluid might increase if the joint is irritated for some reason.  This is benign, he still has a good range of motion, and nothing needs to be  done     Actinic and seborrheic keratoses, working with his dermatologist     Jorge had COVID-19 infection in January and July 2024, both of these were mild illnesses, and he took Paxlovid for both    Immunization History   Administered Date(s) Administered    COVID-19 12+ (Pfizer) 11/16/2023, 09/19/2024    COVID-19 Bivalent 12+ (Pfizer) 11/01/2022    COVID-19 MONOVALENT 12+ (Pfizer) 02/16/2021, 03/09/2021, 10/11/2021    COVID-19 Monovalent 12+ (Pfizer 2022) 05/16/2022    Flu 65+ (Fluad) 09/13/2022, 10/17/2024    Influenza (H1N1) 03/15/2010    Influenza (High Dose) Trivalent,PF (Fluzone) 10/07/2017, 08/27/2018, 09/17/2019, 09/06/2021, 09/13/2022    Influenza (IIV3) PF 10/01/2004, 10/01/2006, 10/01/2007, 08/15/2009, 08/31/2009, 10/23/2010, 10/15/2011, 08/25/2012, 10/01/2013, 08/22/2014    Influenza Vaccine 65+ (FLUAD) 10/16/2023    Influenza Vaccine 65+ (Fluzone HD) 09/13/2022    Influenza Vaccine, 6+MO IM (QUADRIVALENT W/PRESERVATIVES) 08/28/2015    Pneumo Conj 13-V (2010&after) 05/14/2015    Pneumococcal (PCV 7) 07/30/2010    Pneumococcal 23 valent 01/21/2016    RSV Vaccine (Arexvy) 09/28/2023    TD,PF 7+ (Tenivac) 02/22/2005, 09/28/2016    Td (Adult), Adsorbed 06/16/1997    Zoster recombinant adjuvanted (Shingrix) 11/18/2024, 03/03/2025    Zoster vaccine, live 08/13/2012